# Patient Record
Sex: FEMALE | Race: WHITE | Employment: FULL TIME | URBAN - METROPOLITAN AREA
[De-identification: names, ages, dates, MRNs, and addresses within clinical notes are randomized per-mention and may not be internally consistent; named-entity substitution may affect disease eponyms.]

---

## 2017-03-07 ENCOUNTER — HOSPITAL ENCOUNTER (OUTPATIENT)
Dept: ULTRASOUND IMAGING | Facility: HOSPITAL | Age: 47
Discharge: HOME | End: 2017-03-07
Attending: OBSTETRICS & GYNECOLOGY
Payer: COMMERCIAL

## 2017-03-07 ENCOUNTER — HOSPITAL ENCOUNTER (OUTPATIENT)
Dept: MAMMOGRAPHY | Facility: HOSPITAL | Age: 47
Discharge: HOME | End: 2017-03-07
Attending: OBSTETRICS & GYNECOLOGY
Payer: COMMERCIAL

## 2017-03-07 DIAGNOSIS — R92.2 INCONCLUSIVE MAMMOGRAPHY DUE TO DENSE BREASTS: ICD-10-CM

## 2017-03-07 PROCEDURE — 76642 ULTRASOUND BREAST LIMITED: CPT

## 2017-03-07 PROCEDURE — 77065 DX MAMMO INCL CAD UNI: CPT

## 2017-09-01 ENCOUNTER — HOSPITAL ENCOUNTER (OUTPATIENT)
Dept: MAMMOGRAPHY | Facility: HOSPITAL | Age: 47
Discharge: HOME | End: 2017-09-01
Attending: OBSTETRICS & GYNECOLOGY
Payer: COMMERCIAL

## 2017-09-01 ENCOUNTER — HOSPITAL ENCOUNTER (OUTPATIENT)
Dept: ULTRASOUND IMAGING | Facility: HOSPITAL | Age: 47
Discharge: HOME | End: 2017-09-01
Attending: OBSTETRICS & GYNECOLOGY
Payer: COMMERCIAL

## 2017-09-01 DIAGNOSIS — R92.2 BREAST DENSITY: ICD-10-CM

## 2017-09-01 PROCEDURE — 76642 ULTRASOUND BREAST LIMITED: CPT | Performed by: OBSTETRICS & GYNECOLOGY

## 2017-09-01 PROCEDURE — 77066 DX MAMMO INCL CAD BI: CPT | Performed by: OBSTETRICS & GYNECOLOGY

## 2017-10-09 NOTE — PROGRESS NOTES
[address] [facility] [address] Gastroenterology                                                                                                  Clinic History and Physical     Pa as red sauces, spicy foods, or tomatoes. The patient has a family history of colon cancer in her paternal grandmother who was diagnosed in her [de-identified]. The patient feels otherwise well.   She denies lower abdominal pain, change in bowel habits, dysphagia, Magnesium (M2 MAGNESIUM) 100 MG Oral Cap Take  by mouth. Disp:  Rfl:    B Complex Oral Cap Take  by mouth. Disp:  Rfl:    Calcium Citrate-Vitamin D (CITRACAL/VITAMIN D OR) Take  by mouth.  Disp:  Rfl:        Allergies:    Iodine  [Radiology *    Hives  Pe microscopic hematuria, diverticulitis, ovarian cyst, who presents for evaluation of GERD. No recent CBC available to assess anemia status. 1.  GERD/epigastric pain: Physical exam unremarkable.   Patient has a long-standing history of GERD which she ha

## 2017-10-10 ENCOUNTER — OFFICE VISIT (OUTPATIENT)
Dept: GASTROENTEROLOGY | Facility: CLINIC | Age: 47
End: 2017-10-10

## 2017-10-10 VITALS
BODY MASS INDEX: 21.34 KG/M2 | SYSTOLIC BLOOD PRESSURE: 100 MMHG | DIASTOLIC BLOOD PRESSURE: 68 MMHG | WEIGHT: 125 LBS | HEIGHT: 64 IN | HEART RATE: 76 BPM

## 2017-10-10 DIAGNOSIS — K21.9 GASTROESOPHAGEAL REFLUX DISEASE WITHOUT ESOPHAGITIS: ICD-10-CM

## 2017-10-10 DIAGNOSIS — Z80.0 FAMILY HISTORY OF COLON CANCER: Primary | ICD-10-CM

## 2017-10-10 DIAGNOSIS — R10.13 EPIGASTRIC PAIN: ICD-10-CM

## 2017-10-10 PROCEDURE — 99203 OFFICE O/P NEW LOW 30 MIN: CPT | Performed by: NURSE PRACTITIONER

## 2017-10-10 PROCEDURE — 99212 OFFICE O/P EST SF 10 MIN: CPT | Performed by: NURSE PRACTITIONER

## 2017-10-10 NOTE — H&P
[address] [facility] [address] Gastroenterology                                                                                                  Clinic History and Physical     Pa as red sauces, spicy foods, or tomatoes. The patient has a family history of colon cancer in her paternal grandmother who was diagnosed in her [de-identified]. The patient feels otherwise well.   She denies lower abdominal pain, change in bowel habits, dysphagia, Magnesium (M2 MAGNESIUM) 100 MG Oral Cap Take  by mouth. Disp:  Rfl:    B Complex Oral Cap Take  by mouth. Disp:  Rfl:    Calcium Citrate-Vitamin D (CITRACAL/VITAMIN D OR) Take  by mouth.  Disp:  Rfl:        Allergies:    Iodine  [Radiology *    Hives  Pe microscopic hematuria, diverticulitis, ovarian cyst, who presents for evaluation of GERD. No recent CBC available to assess anemia status. 1.  GERD/epigastric pain: Physical exam unremarkable.   Patient has a long-standing history of GERD which she ha

## 2017-10-10 NOTE — PATIENT INSTRUCTIONS
1. Continue Prevacid once daily in morning before breakfast  2. May take Zantac as needed for breakthrough symptoms   3. See additional reflux suggestions below  4.  Call us when you are ready to schedule colonoscopy       Avoid Heartburn Triggers  - Laying

## 2018-03-06 ENCOUNTER — HOSPITAL ENCOUNTER (OUTPATIENT)
Dept: ULTRASOUND IMAGING | Facility: HOSPITAL | Age: 48
Discharge: HOME | End: 2018-03-06
Attending: OBSTETRICS & GYNECOLOGY
Payer: COMMERCIAL

## 2018-03-06 ENCOUNTER — HOSPITAL ENCOUNTER (OUTPATIENT)
Dept: MAMMOGRAPHY | Facility: HOSPITAL | Age: 48
Discharge: HOME | End: 2018-03-06
Attending: OBSTETRICS & GYNECOLOGY
Payer: COMMERCIAL

## 2018-03-06 DIAGNOSIS — R92.2 BREAST DENSITY: ICD-10-CM

## 2018-03-06 PROCEDURE — 77061 BREAST TOMOSYNTHESIS UNI: CPT | Performed by: OBSTETRICS & GYNECOLOGY

## 2018-03-06 PROCEDURE — 76642 ULTRASOUND BREAST LIMITED: CPT | Performed by: OBSTETRICS & GYNECOLOGY

## 2018-03-06 PROCEDURE — 77065 DX MAMMO INCL CAD UNI: CPT | Performed by: OBSTETRICS & GYNECOLOGY

## 2018-05-15 NOTE — PROGRESS NOTES
[facility] Follow-up Visit    Kortney sign of a hernia. Denies dysphagia or odynophagia. No change in appetite or unexpected weight loss. No chest pain or shortness of breath. No associated abdominal pain, changes in bowel habits, hematochezia, or melena.     Patient is overdue for a sc (COLYTE WITH FLAVOR PACKS) 240 g Oral Recon Soln As directed per GI consult notes Disp: 1 Bottle Rfl: 0   Acidophilus/Pectin Oral Cap Take 1 capsule by mouth daily. Disp:  Rfl:    lansoprazole (PREVACID) 30 MG Oral Capsule Delayed Release Take  by mouth.  D all 4 extremities   Psych: Pt has a normal mood and affect, behavior is normal    Nursing note and vitals reviewed      Labs/Imaging:     Patient's labs and imaging were reviewed and discussed with patient today. See HPI and A&P for further details. Sharon Zuleta amenable to proceeding. We reviewed the risks, benefits, limitations of the procedure.     5.  Family history of colon cancer        Recommend:  -Schedule colonoscopy w/ Dr. Lolis Dale with [facility] [place] or [facility] pending facility  -Prep: Split dose Colyte or equiva

## 2018-05-17 ENCOUNTER — OFFICE VISIT (OUTPATIENT)
Dept: GASTROENTEROLOGY | Facility: CLINIC | Age: 48
End: 2018-05-17

## 2018-05-17 ENCOUNTER — TELEPHONE (OUTPATIENT)
Dept: GASTROENTEROLOGY | Facility: CLINIC | Age: 48
End: 2018-05-17

## 2018-05-17 VITALS
HEIGHT: 64 IN | WEIGHT: 126 LBS | BODY MASS INDEX: 21.51 KG/M2 | DIASTOLIC BLOOD PRESSURE: 73 MMHG | SYSTOLIC BLOOD PRESSURE: 109 MMHG | HEART RATE: 66 BPM

## 2018-05-17 DIAGNOSIS — Z80.0 FAMILY HISTORY OF COLON CANCER: ICD-10-CM

## 2018-05-17 DIAGNOSIS — Z12.11 SCREENING FOR COLON CANCER: ICD-10-CM

## 2018-05-17 DIAGNOSIS — Z12.11 COLON CANCER SCREENING: Primary | ICD-10-CM

## 2018-05-17 DIAGNOSIS — K44.9 HIATAL HERNIA WITH GERD: Primary | ICD-10-CM

## 2018-05-17 DIAGNOSIS — K44.9 HIATAL HERNIA: ICD-10-CM

## 2018-05-17 DIAGNOSIS — R19.8 ASYMMETRY OF ABDOMINAL WALL: ICD-10-CM

## 2018-05-17 DIAGNOSIS — K21.9 HIATAL HERNIA WITH GERD: Primary | ICD-10-CM

## 2018-05-17 DIAGNOSIS — K21.9 GASTROESOPHAGEAL REFLUX DISEASE, ESOPHAGITIS PRESENCE NOT SPECIFIED: ICD-10-CM

## 2018-05-17 PROCEDURE — 99214 OFFICE O/P EST MOD 30 MIN: CPT | Performed by: NURSE PRACTITIONER

## 2018-05-17 PROCEDURE — 99212 OFFICE O/P EST SF 10 MIN: CPT | Performed by: NURSE PRACTITIONER

## 2018-05-17 RX ORDER — GARLIC EXTRACT 500 MG
1 CAPSULE ORAL DAILY
COMMUNITY
End: 2021-10-28

## 2018-05-17 NOTE — PATIENT INSTRUCTIONS
1. Schedule colonoscopy with Dr. Darcy Walker w/ [facility] or [facility] pending facility    2.  bowel prep from pharmacy - split dose Colyte    3. Continue all medications for procedure     4. Read all bowel prep instructions carefully    5.  AVOID seeds, nuts,

## 2018-05-17 NOTE — TELEPHONE ENCOUNTER
Scheduled for:  Colonoscopy - 63338  Provider Name:  Dr. Mary Harris  Date:  6/8/18  Location:  [facility]  Sedation:  IV  Time:  11:30 am (pt is aware to arrive at 10:30 am)  Prep:  Colyte, Prep instructions were given to pt in the office, pt verbalized understanding.

## 2018-05-31 NOTE — TELEPHONE ENCOUNTER
I spoke with this patient to see if she would like to come for her procedure 30 min earlier, from 1130 to 1100 (to arrive at 1000). She agreed to change her time and will arrive at 1000.

## 2018-06-04 ENCOUNTER — TELEPHONE (OUTPATIENT)
Dept: GASTROENTEROLOGY | Facility: CLINIC | Age: 48
End: 2018-06-04

## 2018-06-04 NOTE — TELEPHONE ENCOUNTER
[facility] sent RX to pharmacy on 5/17/18. Spoke w/ Brenden Manzano at [facility] and states they have the RX \"on hold. \" He will process the RX for her and notify her once it is ready for  (states it's $0 copay).     LM for pt to f/u w/ pharmacy for  time an

## 2018-06-08 ENCOUNTER — SURGERY (OUTPATIENT)
Age: 48
End: 2018-06-08

## 2018-06-08 ENCOUNTER — HOSPITAL ENCOUNTER (OUTPATIENT)
Facility: HOSPITAL | Age: 48
Setting detail: HOSPITAL OUTPATIENT SURGERY
Discharge: HOME | End: 2018-06-08
Attending: INTERNAL MEDICINE | Admitting: INTERNAL MEDICINE
Payer: COMMERCIAL

## 2018-06-08 VITALS
OXYGEN SATURATION: 100 % | SYSTOLIC BLOOD PRESSURE: 100 MMHG | HEART RATE: 62 BPM | DIASTOLIC BLOOD PRESSURE: 74 MMHG | WEIGHT: 126 LBS | HEIGHT: 64 IN | RESPIRATION RATE: 15 BRPM | BODY MASS INDEX: 21.51 KG/M2

## 2018-06-08 DIAGNOSIS — K21.9 GASTROESOPHAGEAL REFLUX DISEASE, ESOPHAGITIS PRESENCE NOT SPECIFIED: ICD-10-CM

## 2018-06-08 DIAGNOSIS — Z80.0 FAMILY HISTORY OF COLON CANCER: ICD-10-CM

## 2018-06-08 DIAGNOSIS — K44.9 HIATAL HERNIA: ICD-10-CM

## 2018-06-08 DIAGNOSIS — Z12.11 COLON CANCER SCREENING: ICD-10-CM

## 2018-06-08 PROCEDURE — 0DBM8ZX EXCISION OF DESCENDING COLON, VIA NATURAL OR ARTIFICIAL OPENING ENDOSCOPIC, DIAGNOSTIC: ICD-10-PCS | Performed by: INTERNAL MEDICINE

## 2018-06-08 PROCEDURE — 45385 COLONOSCOPY W/LESION REMOVAL: CPT | Performed by: INTERNAL MEDICINE

## 2018-06-08 PROCEDURE — G0500 MOD SEDAT ENDO SERVICE >5YRS: HCPCS | Performed by: INTERNAL MEDICINE

## 2018-06-08 RX ORDER — MIDAZOLAM HYDROCHLORIDE 1 MG/ML
INJECTION INTRAMUSCULAR; INTRAVENOUS
Status: DISCONTINUED | OUTPATIENT
Start: 2018-06-08 | End: 2018-06-08

## 2018-06-08 RX ORDER — MIDAZOLAM HYDROCHLORIDE 1 MG/ML
1 INJECTION INTRAMUSCULAR; INTRAVENOUS EVERY 5 MIN PRN
Status: DISCONTINUED | OUTPATIENT
Start: 2018-06-08 | End: 2018-06-08

## 2018-06-08 RX ORDER — SODIUM CHLORIDE 0.9 % (FLUSH) 0.9 %
10 SYRINGE (ML) INJECTION AS NEEDED
Status: DISCONTINUED | OUTPATIENT
Start: 2018-06-08 | End: 2018-06-08

## 2018-06-08 RX ORDER — SODIUM CHLORIDE, SODIUM LACTATE, POTASSIUM CHLORIDE, CALCIUM CHLORIDE 600; 310; 30; 20 MG/100ML; MG/100ML; MG/100ML; MG/100ML
INJECTION, SOLUTION INTRAVENOUS CONTINUOUS
Status: DISCONTINUED | OUTPATIENT
Start: 2018-06-08 | End: 2018-06-08

## 2018-06-08 NOTE — OPERATIVE REPORT
[facility] Endoscopy Report      Preoperative Diagnosis:  - colon cancer screening  - family history colon cancer       Postoperative Diagnosis:  - colon polyp x 1  - pan-diverticular disease  - internal hemorrhoids      Procedure:    Colon

## 2018-06-08 NOTE — H&P
History & Physical Examination    Patient Name: Jackeline Munson  MRN: [id number]  CSN: [id number]  YOB: 1970    Diagnosis: colon screening/family history    Prescriptions Prior to Admission:  psyllium 28 % Oral Powd Pack Take 1 packet by mo drinks daily       SYSTEM Check if Review is Normal Check if Physical Exam is Normal If not normal, please explain:   HEENT [x ] [ x]    NECK & BACK [x ] [x ]    HEART [x ] [ x]    LUNGS [x ] [ x]    ABDOMEN [x ] [x ]    UROGENITAL [ ] [ ]    EXTREMITIES [

## 2018-06-14 ENCOUNTER — LAB ENCOUNTER (OUTPATIENT)
Dept: LAB | Age: 48
End: 2018-06-14
Attending: FAMILY MEDICINE
Payer: COMMERCIAL

## 2018-06-14 ENCOUNTER — OFFICE VISIT (OUTPATIENT)
Dept: FAMILY MEDICINE CLINIC | Facility: CLINIC | Age: 48
End: 2018-06-14

## 2018-06-14 VITALS
SYSTOLIC BLOOD PRESSURE: 122 MMHG | DIASTOLIC BLOOD PRESSURE: 76 MMHG | BODY MASS INDEX: 20.83 KG/M2 | WEIGHT: 122 LBS | HEART RATE: 61 BPM | HEIGHT: 64 IN

## 2018-06-14 DIAGNOSIS — Z00.00 ROUTINE MEDICAL EXAM: ICD-10-CM

## 2018-06-14 DIAGNOSIS — Z00.00 ROUTINE MEDICAL EXAM: Primary | ICD-10-CM

## 2018-06-14 PROCEDURE — 80061 LIPID PANEL: CPT

## 2018-06-14 PROCEDURE — 85025 COMPLETE CBC W/AUTO DIFF WBC: CPT

## 2018-06-14 PROCEDURE — 36415 COLL VENOUS BLD VENIPUNCTURE: CPT

## 2018-06-14 PROCEDURE — 82607 VITAMIN B-12: CPT

## 2018-06-14 PROCEDURE — 84443 ASSAY THYROID STIM HORMONE: CPT

## 2018-06-14 PROCEDURE — 99386 PREV VISIT NEW AGE 40-64: CPT | Performed by: FAMILY MEDICINE

## 2018-06-14 PROCEDURE — 80053 COMPREHEN METABOLIC PANEL: CPT

## 2018-06-14 PROCEDURE — 82306 VITAMIN D 25 HYDROXY: CPT

## 2018-06-14 NOTE — PROGRESS NOTES
HPI:   Nivia Rosado is a 46year old female who presents for a complete physical exam.    Reports no concerns with her health. Does exercise on occasion - eats healthy overall. Just had her colonoscopy  - Dr. Ranjan Galindo.   Sees urology at [facility] vision or double vision  HEENT: denies nasal congestion, sinus pain or ST  LUNGS: denies shortness of breath with exertion, denies orthopnea  CARDIOVASCULAR: denies chest pain on exertion  GI: denies abdominal pain,denies heartburn  : denies dysuria, vag

## 2018-06-18 ENCOUNTER — TELEPHONE (OUTPATIENT)
Dept: GASTROENTEROLOGY | Facility: CLINIC | Age: 48
End: 2018-06-18

## 2018-06-18 NOTE — TELEPHONE ENCOUNTER
Results letter generated and mailed to pt home address today. Entered into EPIC:Recall colon in 5 years per Dr. Jaycee Isaac. Last Colon done 6/8/18, next due 6/8/23. Snapshot updated.

## 2018-06-18 NOTE — TELEPHONE ENCOUNTER
----- Message from Tata Boyd MD sent at 6/15/2018  4:17 PM CDT -----  I wanted to get back to you with your colonoscopy results. You had 1 colon polyp removed which was benign.   I would advise a repeat colonoscopy in 5 years to make sure no new saúl

## 2018-06-23 ENCOUNTER — HOSPITAL ENCOUNTER (OUTPATIENT)
Age: 48
Discharge: HOME | End: 2018-06-23
Attending: EMERGENCY MEDICINE
Payer: COMMERCIAL

## 2018-06-23 VITALS
HEART RATE: 78 BPM | TEMPERATURE: 98 F | DIASTOLIC BLOOD PRESSURE: 72 MMHG | SYSTOLIC BLOOD PRESSURE: 125 MMHG | HEIGHT: 64 IN | RESPIRATION RATE: 18 BRPM | OXYGEN SATURATION: 100 % | WEIGHT: 125 LBS | BODY MASS INDEX: 21.34 KG/M2

## 2018-06-23 DIAGNOSIS — S40.861A INSECT BITE OF RIGHT ARM, INITIAL ENCOUNTER: ICD-10-CM
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
## 2018-06-23 DIAGNOSIS — L03.113 CELLULITIS OF RIGHT UPPER ARM: Primary | ICD-10-CM

## 2018-06-23 PROCEDURE — 99213 OFFICE O/P EST LOW 20 MIN: CPT

## 2018-06-23 PROCEDURE — 99203 OFFICE O/P NEW LOW 30 MIN: CPT

## 2018-06-23 RX ORDER — CEPHALEXIN 500 MG/1
500 CAPSULE ORAL 3 TIMES DAILY
Qty: 21 CAPSULE | Refills: 0 | Status: SHIPPED | OUTPATIENT
Start: 2018-06-23 | End: 2018-06-30

## 2018-06-23 NOTE — ED PROVIDER NOTES
Patient Seen in: [facility] In [address] [place] [address]    History   Patient presents with:  Bite Sting,Insect (integumentary)    Stated Complaint: Insect Bite    HPI    The patient's a 47-year-old female with a history of gastritis who presents wi 125/72  Pulse: 78  Resp: 18  Temp: 97.9 °F (36.6 °C)  Temp src: Oral  SpO2: 100 %  O2 Device: None (Room air)    Current:/72   Pulse 78   Temp 97.9 °F (36.6 °C) (Oral)   Resp 18   Ht 162.6 cm (5' 4\")   Wt 56.7 kg   LMP 06/14/2018 (Approximate)   SpO

## 2018-06-23 NOTE — ED INITIAL ASSESSMENT (HPI)
Pt reports noticing a bug bite to R upper arm on tues. Now surrounded by redness. Denies fever, chills or body aches.

## 2018-06-26 NOTE — PROGRESS NOTES
You do have mildly elevated cholesterol. Need to change diet and start exercising 4-5 times a week 30-45 minutes. . Decrease food portions. No fast foods - stick with fresh fruits/veggies, chicken and fish.  Can also take fish oil 1000 mg 1-2 tablets twice a

## 2018-07-03 ENCOUNTER — TELEPHONE (OUTPATIENT)
Dept: FAMILY MEDICINE CLINIC | Facility: CLINIC | Age: 48
End: 2018-07-03

## 2018-07-03 NOTE — TELEPHONE ENCOUNTER
Dr. Markell Mendoza received and completed [employer] Waiver form. Form faxed to [phone] confirmation received. Per patient request copy mailed to home address.

## 2018-08-09 ENCOUNTER — HOSPITAL ENCOUNTER (OUTPATIENT)
Dept: ULTRASOUND IMAGING | Facility: HOSPITAL | Age: 48
Discharge: HOME | End: 2018-08-09
Attending: OBSTETRICS & GYNECOLOGY
Payer: COMMERCIAL

## 2018-08-09 ENCOUNTER — HOSPITAL ENCOUNTER (OUTPATIENT)
Dept: MAMMOGRAPHY | Facility: HOSPITAL | Age: 48
Discharge: HOME | End: 2018-08-09
Attending: OBSTETRICS & GYNECOLOGY
Payer: COMMERCIAL

## 2018-08-09 DIAGNOSIS — R92.2 INCONCLUSIVE MAMMOGRAM: ICD-10-CM

## 2018-08-09 PROCEDURE — 77066 DX MAMMO INCL CAD BI: CPT | Performed by: OBSTETRICS & GYNECOLOGY

## 2018-08-09 PROCEDURE — 77062 BREAST TOMOSYNTHESIS BI: CPT | Performed by: OBSTETRICS & GYNECOLOGY

## 2018-08-09 PROCEDURE — 76642 ULTRASOUND BREAST LIMITED: CPT | Performed by: OBSTETRICS & GYNECOLOGY

## 2019-07-16 ENCOUNTER — OFFICE VISIT (OUTPATIENT)
Dept: FAMILY MEDICINE CLINIC | Facility: CLINIC | Age: 49
End: 2019-07-16
Payer: COMMERCIAL

## 2019-07-16 ENCOUNTER — LAB ENCOUNTER (OUTPATIENT)
Dept: LAB | Age: 49
End: 2019-07-16
Attending: FAMILY MEDICINE
Payer: COMMERCIAL

## 2019-07-16 VITALS
HEIGHT: 64 IN | DIASTOLIC BLOOD PRESSURE: 67 MMHG | HEART RATE: 58 BPM | BODY MASS INDEX: 22.02 KG/M2 | SYSTOLIC BLOOD PRESSURE: 95 MMHG | WEIGHT: 129 LBS

## 2019-07-16 DIAGNOSIS — R14.0 ABDOMINAL BLOATING: ICD-10-CM

## 2019-07-16 DIAGNOSIS — Z00.00 ROUTINE MEDICAL EXAM: Primary | ICD-10-CM

## 2019-07-16 DIAGNOSIS — Z00.00 ROUTINE MEDICAL EXAM: ICD-10-CM

## 2019-07-16 LAB
ALBUMIN SERPL-MCNC: 4.2 G/DL (ref 3.4–5)
ALBUMIN/GLOB SERPL: 1.2 {RATIO} (ref 1–2)
ALP LIVER SERPL-CCNC: 54 U/L (ref 41–108)
ALT SERPL-CCNC: 17 U/L (ref 13–56)
ANION GAP SERPL CALC-SCNC: 6 MMOL/L (ref 0–18)
AST SERPL-CCNC: 10 U/L (ref 15–37)
BASOPHILS # BLD AUTO: 0.04 X10(3) UL (ref 0–0.2)
BASOPHILS NFR BLD AUTO: 0.6 %
BILIRUB SERPL-MCNC: 0.7 MG/DL (ref 0.1–2)
BUN BLD-MCNC: 17 MG/DL (ref 7–18)
BUN/CREAT SERPL: 19.3 (ref 10–20)
CALCIUM BLD-MCNC: 9.5 MG/DL (ref 8.5–10.1)
CHLORIDE SERPL-SCNC: 107 MMOL/L (ref 98–112)
CHOLEST SMN-MCNC: 206 MG/DL (ref ?–200)
CO2 SERPL-SCNC: 27 MMOL/L (ref 21–32)
CREAT BLD-MCNC: 0.88 MG/DL (ref 0.55–1.02)
DEPRECATED RDW RBC AUTO: 41.6 FL (ref 35.1–46.3)
EOSINOPHIL # BLD AUTO: 0.11 X10(3) UL (ref 0–0.7)
EOSINOPHIL NFR BLD AUTO: 1.6 %
ERYTHROCYTE [DISTWIDTH] IN BLOOD BY AUTOMATED COUNT: 12 % (ref 11–15)
GLOBULIN PLAS-MCNC: 3.4 G/DL (ref 2.8–4.4)
GLUCOSE BLD-MCNC: 87 MG/DL (ref 70–99)
HCT VFR BLD AUTO: 46.5 % (ref 35–48)
HDLC SERPL-MCNC: 88 MG/DL (ref 40–59)
HGB BLD-MCNC: 15.5 G/DL (ref 12–16)
IMM GRANULOCYTES # BLD AUTO: 0.02 X10(3) UL (ref 0–1)
IMM GRANULOCYTES NFR BLD: 0.3 %
LDLC SERPL CALC-MCNC: 107 MG/DL (ref ?–100)
LYMPHOCYTES # BLD AUTO: 1.92 X10(3) UL (ref 1–4)
LYMPHOCYTES NFR BLD AUTO: 27.3 %
M PROTEIN MFR SERPL ELPH: 7.6 G/DL (ref 6.4–8.2)
MCH RBC QN AUTO: 31.2 PG (ref 26–34)
MCHC RBC AUTO-ENTMCNC: 33.3 G/DL (ref 31–37)
MCV RBC AUTO: 93.6 FL (ref 80–100)
MONOCYTES # BLD AUTO: 0.52 X10(3) UL (ref 0.1–1)
MONOCYTES NFR BLD AUTO: 7.4 %
NEUTROPHILS # BLD AUTO: 4.43 X10 (3) UL (ref 1.5–7.7)
NEUTROPHILS # BLD AUTO: 4.43 X10(3) UL (ref 1.5–7.7)
NEUTROPHILS NFR BLD AUTO: 62.8 %
NONHDLC SERPL-MCNC: 118 MG/DL (ref ?–130)
OSMOLALITY SERPL CALC.SUM OF ELEC: 291 MOSM/KG (ref 275–295)
PATIENT FASTING: YES
PATIENT FASTING: YES
PLATELET # BLD AUTO: 210 10(3)UL (ref 150–450)
POTASSIUM SERPL-SCNC: 4.2 MMOL/L (ref 3.5–5.1)
RBC # BLD AUTO: 4.97 X10(6)UL (ref 3.8–5.3)
SODIUM SERPL-SCNC: 140 MMOL/L (ref 136–145)
TRIGL SERPL-MCNC: 54 MG/DL (ref 30–149)
TSI SER-ACNC: 1.34 MIU/ML (ref 0.36–3.74)
VIT B12 SERPL-MCNC: 466 PG/ML (ref 193–986)
VLDLC SERPL CALC-MCNC: 11 MG/DL (ref 0–30)
WBC # BLD AUTO: 7 X10(3) UL (ref 4–11)

## 2019-07-16 PROCEDURE — 84443 ASSAY THYROID STIM HORMONE: CPT

## 2019-07-16 PROCEDURE — 36415 COLL VENOUS BLD VENIPUNCTURE: CPT

## 2019-07-16 PROCEDURE — 80053 COMPREHEN METABOLIC PANEL: CPT

## 2019-07-16 PROCEDURE — 82607 VITAMIN B-12: CPT

## 2019-07-16 PROCEDURE — 99396 PREV VISIT EST AGE 40-64: CPT | Performed by: FAMILY MEDICINE

## 2019-07-16 PROCEDURE — 82306 VITAMIN D 25 HYDROXY: CPT

## 2019-07-16 PROCEDURE — 85025 COMPLETE CBC W/AUTO DIFF WBC: CPT

## 2019-07-16 PROCEDURE — 80061 LIPID PANEL: CPT

## 2019-07-16 RX ORDER — NICOTINE POLACRILEX 2 MG
1 GUM BUCCAL DAILY
COMMUNITY
Start: 2019-02-01 | End: 2021-08-24

## 2019-07-16 NOTE — PROGRESS NOTES
HPI:   Kapil Justin is a 44year old female who presents for a complete physical exam.    Pt here for regular physical. Exercises about 30 minutes - walks stairs at work 12 floors. Eats healthy overall.    Regular menses - lasts only a day  Had cool sc Alcohol use: Yes      Comment: 2 drinks daily    Drug use: No         REVIEW OF SYSTEMS:   GENERAL: feels well otherwise  SKIN: denies any skin lesions  EYES:denies blurred vision or double vision  HEENT: denies nasal congestion, sinus pain or ST  LUNGS:

## 2019-07-17 LAB — 25(OH)D3 SERPL-MCNC: 27.6 NG/ML (ref 30–100)

## 2019-07-23 NOTE — PROGRESS NOTES
Efraín Lambert job on the cholesterol - improved compared to last year. Vitamin d levels are a bit low so please take extra vitamin D 2000 units daily.  Rest of the labs were normal. Normal glucose, kidney, liver and thyroid function. - Dr. Jason Gallego

## 2019-08-06 ENCOUNTER — TELEPHONE (OUTPATIENT)
Dept: FAMILY MEDICINE CLINIC | Facility: CLINIC | Age: 49
End: 2019-08-06

## 2019-08-06 NOTE — TELEPHONE ENCOUNTER
Dr. Echo Coleman received and completed Health Provider Screening form. Form faxed to [phone] confirmation received. Per patient request copy mailed to home address.

## 2019-09-09 ENCOUNTER — HOSPITAL ENCOUNTER (OUTPATIENT)
Dept: MAMMOGRAPHY | Facility: HOSPITAL | Age: 49
Discharge: HOME | End: 2019-09-09
Attending: OBSTETRICS & GYNECOLOGY
Payer: COMMERCIAL

## 2019-09-09 ENCOUNTER — HOSPITAL ENCOUNTER (OUTPATIENT)
Dept: ULTRASOUND IMAGING | Facility: HOSPITAL | Age: 49
Discharge: HOME | End: 2019-09-09
Attending: OBSTETRICS & GYNECOLOGY
Payer: COMMERCIAL

## 2019-09-09 DIAGNOSIS — N63.0 BREAST LUMP: ICD-10-CM

## 2019-09-09 PROCEDURE — 77062 BREAST TOMOSYNTHESIS BI: CPT | Performed by: OBSTETRICS & GYNECOLOGY

## 2019-09-09 PROCEDURE — 77066 DX MAMMO INCL CAD BI: CPT | Performed by: OBSTETRICS & GYNECOLOGY

## 2019-09-09 PROCEDURE — 76642 ULTRASOUND BREAST LIMITED: CPT | Performed by: OBSTETRICS & GYNECOLOGY

## 2020-05-29 ENCOUNTER — TELEPHONE (OUTPATIENT)
Dept: FAMILY MEDICINE CLINIC | Facility: CLINIC | Age: 50
End: 2020-05-29

## 2020-05-29 DIAGNOSIS — E55.9 VITAMIN D DEFICIENCY: ICD-10-CM

## 2020-05-29 DIAGNOSIS — Z00.00 ROUTINE MEDICAL EXAM: Primary | ICD-10-CM

## 2020-07-30 ENCOUNTER — LAB ENCOUNTER (OUTPATIENT)
Dept: LAB | Age: 50
End: 2020-07-30
Attending: FAMILY MEDICINE
Payer: COMMERCIAL

## 2020-07-30 DIAGNOSIS — Z00.00 ROUTINE MEDICAL EXAM: ICD-10-CM

## 2020-07-30 DIAGNOSIS — E55.9 VITAMIN D DEFICIENCY: ICD-10-CM

## 2020-07-30 LAB
ALBUMIN SERPL-MCNC: 3.7 G/DL (ref 3.4–5)
ALBUMIN/GLOB SERPL: 1.1 {RATIO} (ref 1–2)
ALP LIVER SERPL-CCNC: 53 U/L (ref 41–108)
ALT SERPL-CCNC: 20 U/L (ref 13–56)
ANION GAP SERPL CALC-SCNC: 6 MMOL/L (ref 0–18)
AST SERPL-CCNC: 12 U/L (ref 15–37)
BASOPHILS # BLD AUTO: 0.04 X10(3) UL (ref 0–0.2)
BASOPHILS NFR BLD AUTO: 0.6 %
BILIRUB SERPL-MCNC: 0.5 MG/DL (ref 0.1–2)
BUN BLD-MCNC: 21 MG/DL (ref 7–18)
BUN/CREAT SERPL: 25.3 (ref 10–20)
CALCIUM BLD-MCNC: 8.9 MG/DL (ref 8.5–10.1)
CHLORIDE SERPL-SCNC: 108 MMOL/L (ref 98–112)
CHOLEST SMN-MCNC: 212 MG/DL (ref ?–200)
CO2 SERPL-SCNC: 26 MMOL/L (ref 21–32)
CREAT BLD-MCNC: 0.83 MG/DL (ref 0.55–1.02)
DEPRECATED RDW RBC AUTO: 42.2 FL (ref 35.1–46.3)
EOSINOPHIL # BLD AUTO: 0.14 X10(3) UL (ref 0–0.7)
EOSINOPHIL NFR BLD AUTO: 2.1 %
ERYTHROCYTE [DISTWIDTH] IN BLOOD BY AUTOMATED COUNT: 12.2 % (ref 11–15)
GLOBULIN PLAS-MCNC: 3.3 G/DL (ref 2.8–4.4)
GLUCOSE BLD-MCNC: 87 MG/DL (ref 70–99)
HCT VFR BLD AUTO: 45 % (ref 35–48)
HDLC SERPL-MCNC: 87 MG/DL (ref 40–59)
HGB BLD-MCNC: 15 G/DL (ref 12–16)
IMM GRANULOCYTES # BLD AUTO: 0.03 X10(3) UL (ref 0–1)
IMM GRANULOCYTES NFR BLD: 0.5 %
LDLC SERPL CALC-MCNC: 109 MG/DL (ref ?–100)
LYMPHOCYTES # BLD AUTO: 1.59 X10(3) UL (ref 1–4)
LYMPHOCYTES NFR BLD AUTO: 24.2 %
M PROTEIN MFR SERPL ELPH: 7 G/DL (ref 6.4–8.2)
MCH RBC QN AUTO: 31.4 PG (ref 26–34)
MCHC RBC AUTO-ENTMCNC: 33.3 G/DL (ref 31–37)
MCV RBC AUTO: 94.1 FL (ref 80–100)
MONOCYTES # BLD AUTO: 0.52 X10(3) UL (ref 0.1–1)
MONOCYTES NFR BLD AUTO: 7.9 %
NEUTROPHILS # BLD AUTO: 4.24 X10 (3) UL (ref 1.5–7.7)
NEUTROPHILS # BLD AUTO: 4.24 X10(3) UL (ref 1.5–7.7)
NEUTROPHILS NFR BLD AUTO: 64.7 %
NONHDLC SERPL-MCNC: 125 MG/DL (ref ?–130)
OSMOLALITY SERPL CALC.SUM OF ELEC: 292 MOSM/KG (ref 275–295)
PATIENT FASTING Y/N/NP: YES
PATIENT FASTING Y/N/NP: YES
PLATELET # BLD AUTO: 225 10(3)UL (ref 150–450)
POTASSIUM SERPL-SCNC: 4.3 MMOL/L (ref 3.5–5.1)
RBC # BLD AUTO: 4.78 X10(6)UL (ref 3.8–5.3)
SODIUM SERPL-SCNC: 140 MMOL/L (ref 136–145)
TRIGL SERPL-MCNC: 82 MG/DL (ref 30–149)
TSI SER-ACNC: 1.41 MIU/ML (ref 0.36–3.74)
VIT B12 SERPL-MCNC: 482 PG/ML (ref 193–986)
VLDLC SERPL CALC-MCNC: 16 MG/DL (ref 0–30)
WBC # BLD AUTO: 6.6 X10(3) UL (ref 4–11)

## 2020-07-30 PROCEDURE — 82306 VITAMIN D 25 HYDROXY: CPT

## 2020-07-30 PROCEDURE — 80061 LIPID PANEL: CPT

## 2020-07-30 PROCEDURE — 80053 COMPREHEN METABOLIC PANEL: CPT

## 2020-07-30 PROCEDURE — 36415 COLL VENOUS BLD VENIPUNCTURE: CPT

## 2020-07-30 PROCEDURE — 84443 ASSAY THYROID STIM HORMONE: CPT

## 2020-07-30 PROCEDURE — 85025 COMPLETE CBC W/AUTO DIFF WBC: CPT

## 2020-07-30 PROCEDURE — 82607 VITAMIN B-12: CPT

## 2020-07-31 LAB — 25(OH)D3 SERPL-MCNC: 36.3 NG/ML (ref 30–100)

## 2020-08-17 ENCOUNTER — OFFICE VISIT (OUTPATIENT)
Dept: FAMILY MEDICINE CLINIC | Facility: CLINIC | Age: 50
End: 2020-08-17
Payer: COMMERCIAL

## 2020-08-17 VITALS
HEIGHT: 64 IN | BODY MASS INDEX: 22.53 KG/M2 | SYSTOLIC BLOOD PRESSURE: 114 MMHG | WEIGHT: 132 LBS | HEART RATE: 72 BPM | DIASTOLIC BLOOD PRESSURE: 70 MMHG

## 2020-08-17 DIAGNOSIS — Z01.419 ROUTINE GYNECOLOGICAL EXAMINATION: Primary | ICD-10-CM

## 2020-08-17 DIAGNOSIS — R92.8 ABNORMAL MAMMOGRAM: ICD-10-CM

## 2020-08-17 PROCEDURE — 99396 PREV VISIT EST AGE 40-64: CPT | Performed by: FAMILY MEDICINE

## 2020-08-17 PROCEDURE — 3078F DIAST BP <80 MM HG: CPT | Performed by: FAMILY MEDICINE

## 2020-08-17 PROCEDURE — 3008F BODY MASS INDEX DOCD: CPT | Performed by: FAMILY MEDICINE

## 2020-08-17 PROCEDURE — 3074F SYST BP LT 130 MM HG: CPT | Performed by: FAMILY MEDICINE

## 2020-08-17 NOTE — PROGRESS NOTES
HPI:   Alejandro Colin is a 44year old female who presents for a complete physical exam.   Patient's last menstrual period was 07/17/2020. Pt here for regular physical/gyne exam. Still has menses but light. Exercises but not as much.    Wt Readings from Cerebral Aneurysm   • Heart Disease Father         CAD   • Diabetes Brother    • Breast Cancer Paternal Aunt [de-identified]      Social History:   Social History    Tobacco Use      Smoking status: Never Smoker      Smokeless tobacco: Never Used    Alcohol use: done. Order put in for mammogram.. Self breast exam explained. Health maintenance. Pt' s weight is Body mass index is 22.66 kg/m². , recommended low fat diet and aerobic exercise 30 minutes three times weekly.   The patient indicates understanding of these i

## 2020-08-18 LAB — HPV I/H RISK 1 DNA SPEC QL NAA+PROBE: NEGATIVE

## 2020-09-08 ENCOUNTER — MED REC SCAN ONLY (OUTPATIENT)
Dept: FAMILY MEDICINE CLINIC | Facility: CLINIC | Age: 50
End: 2020-09-08

## 2020-09-16 ENCOUNTER — HOSPITAL ENCOUNTER (OUTPATIENT)
Dept: ULTRASOUND IMAGING | Facility: HOSPITAL | Age: 50
Discharge: HOME | End: 2020-09-16
Attending: FAMILY MEDICINE
Payer: COMMERCIAL

## 2020-09-16 ENCOUNTER — HOSPITAL ENCOUNTER (OUTPATIENT)
Dept: MAMMOGRAPHY | Facility: HOSPITAL | Age: 50
Discharge: HOME | End: 2020-09-16
Attending: FAMILY MEDICINE
Payer: COMMERCIAL

## 2020-09-16 DIAGNOSIS — R92.8 ABNORMAL MAMMOGRAM: ICD-10-CM

## 2020-09-16 PROCEDURE — 76642 ULTRASOUND BREAST LIMITED: CPT | Performed by: FAMILY MEDICINE

## 2020-09-16 PROCEDURE — 77062 BREAST TOMOSYNTHESIS BI: CPT | Performed by: FAMILY MEDICINE

## 2020-09-16 PROCEDURE — 77066 DX MAMMO INCL CAD BI: CPT | Performed by: FAMILY MEDICINE

## 2020-09-18 NOTE — PROGRESS NOTES
Pop Loya  - Normal mammogram. Plan for repeat in 1 year. You have benign non concerning cysts within the breasts. Can have normal screening next year.  - Dr Aurelio Arvizu

## 2021-04-01 ENCOUNTER — TELEPHONE (OUTPATIENT)
Dept: FAMILY MEDICINE CLINIC | Facility: CLINIC | Age: 51
End: 2021-04-01

## 2021-04-01 DIAGNOSIS — Z00.00 ROUTINE MEDICAL EXAM: Primary | ICD-10-CM

## 2021-04-01 DIAGNOSIS — E55.9 VITAMIN D DEFICIENCY: ICD-10-CM

## 2021-04-02 NOTE — TELEPHONE ENCOUNTER
Routed to Dr Claire Casanova for advise, thanks.       Future Appointments   Date Time Provider Henna Santana   8/18/2021  9:00 AM [facility]

## 2021-08-09 ENCOUNTER — LAB ENCOUNTER (OUTPATIENT)
Dept: LAB | Age: 51
End: 2021-08-09
Attending: FAMILY MEDICINE
Payer: COMMERCIAL

## 2021-08-09 DIAGNOSIS — E55.9 VITAMIN D DEFICIENCY: ICD-10-CM

## 2021-08-09 DIAGNOSIS — Z00.00 ROUTINE MEDICAL EXAM: ICD-10-CM

## 2021-08-09 LAB
ALBUMIN SERPL-MCNC: 4 G/DL (ref 3.4–5)
ALBUMIN/GLOB SERPL: 1.2 {RATIO} (ref 1–2)
ALP LIVER SERPL-CCNC: 53 U/L
ALT SERPL-CCNC: 21 U/L
ANION GAP SERPL CALC-SCNC: 6 MMOL/L (ref 0–18)
AST SERPL-CCNC: 11 U/L (ref 15–37)
BASOPHILS # BLD AUTO: 0.03 X10(3) UL (ref 0–0.2)
BASOPHILS NFR BLD AUTO: 0.4 %
BILIRUB SERPL-MCNC: 0.6 MG/DL (ref 0.1–2)
BUN BLD-MCNC: 19 MG/DL (ref 7–18)
BUN/CREAT SERPL: 23.8 (ref 10–20)
CALCIUM BLD-MCNC: 8.8 MG/DL (ref 8.5–10.1)
CHLORIDE SERPL-SCNC: 107 MMOL/L (ref 98–112)
CHOLEST SMN-MCNC: 204 MG/DL (ref ?–200)
CO2 SERPL-SCNC: 26 MMOL/L (ref 21–32)
CREAT BLD-MCNC: 0.8 MG/DL
DEPRECATED RDW RBC AUTO: 40.4 FL (ref 35.1–46.3)
EOSINOPHIL # BLD AUTO: 0.15 X10(3) UL (ref 0–0.7)
EOSINOPHIL NFR BLD AUTO: 2.1 %
ERYTHROCYTE [DISTWIDTH] IN BLOOD BY AUTOMATED COUNT: 11.9 % (ref 11–15)
GLOBULIN PLAS-MCNC: 3.4 G/DL (ref 2.8–4.4)
GLUCOSE BLD-MCNC: 85 MG/DL (ref 70–99)
HCT VFR BLD AUTO: 44 %
HDLC SERPL-MCNC: 77 MG/DL (ref 40–59)
HGB BLD-MCNC: 15.1 G/DL
IMM GRANULOCYTES # BLD AUTO: 0.02 X10(3) UL (ref 0–1)
IMM GRANULOCYTES NFR BLD: 0.3 %
LDLC SERPL CALC-MCNC: 113 MG/DL (ref ?–100)
LYMPHOCYTES # BLD AUTO: 1.83 X10(3) UL (ref 1–4)
LYMPHOCYTES NFR BLD AUTO: 25.8 %
M PROTEIN MFR SERPL ELPH: 7.4 G/DL (ref 6.4–8.2)
MCH RBC QN AUTO: 31.4 PG (ref 26–34)
MCHC RBC AUTO-ENTMCNC: 34.3 G/DL (ref 31–37)
MCV RBC AUTO: 91.5 FL
MONOCYTES # BLD AUTO: 0.49 X10(3) UL (ref 0.1–1)
MONOCYTES NFR BLD AUTO: 6.9 %
NEUTROPHILS # BLD AUTO: 4.57 X10 (3) UL (ref 1.5–7.7)
NEUTROPHILS # BLD AUTO: 4.57 X10(3) UL (ref 1.5–7.7)
NEUTROPHILS NFR BLD AUTO: 64.5 %
NONHDLC SERPL-MCNC: 127 MG/DL (ref ?–130)
OSMOLALITY SERPL CALC.SUM OF ELEC: 290 MOSM/KG (ref 275–295)
PATIENT FASTING Y/N/NP: YES
PATIENT FASTING Y/N/NP: YES
PLATELET # BLD AUTO: 225 10(3)UL (ref 150–450)
POTASSIUM SERPL-SCNC: 4.3 MMOL/L (ref 3.5–5.1)
RBC # BLD AUTO: 4.81 X10(6)UL
SODIUM SERPL-SCNC: 139 MMOL/L (ref 136–145)
TRIGL SERPL-MCNC: 79 MG/DL (ref 30–149)
TSI SER-ACNC: 1.83 MIU/ML (ref 0.36–3.74)
VIT B12 SERPL-MCNC: 382 PG/ML (ref 193–986)
VIT D+METAB SERPL-MCNC: 34.2 NG/ML (ref 30–100)
VLDLC SERPL CALC-MCNC: 14 MG/DL (ref 0–30)
WBC # BLD AUTO: 7.1 X10(3) UL (ref 4–11)

## 2021-08-09 PROCEDURE — 36415 COLL VENOUS BLD VENIPUNCTURE: CPT

## 2021-08-09 PROCEDURE — 85025 COMPLETE CBC W/AUTO DIFF WBC: CPT

## 2021-08-09 PROCEDURE — 82607 VITAMIN B-12: CPT

## 2021-08-09 PROCEDURE — 80061 LIPID PANEL: CPT

## 2021-08-09 PROCEDURE — 84443 ASSAY THYROID STIM HORMONE: CPT

## 2021-08-09 PROCEDURE — 82306 VITAMIN D 25 HYDROXY: CPT

## 2021-08-09 PROCEDURE — 80053 COMPREHEN METABOLIC PANEL: CPT

## 2021-08-15 NOTE — PROGRESS NOTES
Pop Loya -Overall your labs look good. Your cholesterol is mildly elevated but not significant to warrant medications.  Will review at your appointment - Dr. Mary Smith

## 2021-08-24 ENCOUNTER — OFFICE VISIT (OUTPATIENT)
Dept: FAMILY MEDICINE CLINIC | Facility: CLINIC | Age: 51
End: 2021-08-24
Payer: COMMERCIAL

## 2021-08-24 ENCOUNTER — TELEPHONE (OUTPATIENT)
Dept: FAMILY MEDICINE CLINIC | Facility: CLINIC | Age: 51
End: 2021-08-24

## 2021-08-24 ENCOUNTER — LAB ENCOUNTER (OUTPATIENT)
Dept: LAB | Age: 51
End: 2021-08-24
Attending: FAMILY MEDICINE
Payer: COMMERCIAL

## 2021-08-24 VITALS
TEMPERATURE: 97 F | BODY MASS INDEX: 23.08 KG/M2 | HEIGHT: 64 IN | SYSTOLIC BLOOD PRESSURE: 98 MMHG | DIASTOLIC BLOOD PRESSURE: 57 MMHG | HEART RATE: 75 BPM | WEIGHT: 135.19 LBS

## 2021-08-24 DIAGNOSIS — R14.0 ABDOMINAL BLOATING: ICD-10-CM

## 2021-08-24 DIAGNOSIS — Z00.00 ROUTINE MEDICAL EXAM: Primary | ICD-10-CM

## 2021-08-24 PROCEDURE — 3074F SYST BP LT 130 MM HG: CPT | Performed by: FAMILY MEDICINE

## 2021-08-24 PROCEDURE — 36415 COLL VENOUS BLD VENIPUNCTURE: CPT

## 2021-08-24 PROCEDURE — 86256 FLUORESCENT ANTIBODY TITER: CPT

## 2021-08-24 PROCEDURE — 86003 ALLG SPEC IGE CRUDE XTRC EA: CPT

## 2021-08-24 PROCEDURE — 82785 ASSAY OF IGE: CPT

## 2021-08-24 PROCEDURE — 83516 IMMUNOASSAY NONANTIBODY: CPT

## 2021-08-24 PROCEDURE — 3008F BODY MASS INDEX DOCD: CPT | Performed by: FAMILY MEDICINE

## 2021-08-24 PROCEDURE — 99396 PREV VISIT EST AGE 40-64: CPT | Performed by: FAMILY MEDICINE

## 2021-08-24 PROCEDURE — 3078F DIAST BP <80 MM HG: CPT | Performed by: FAMILY MEDICINE

## 2021-08-26 ENCOUNTER — LAB ENCOUNTER (OUTPATIENT)
Dept: LAB | Age: 51
End: 2021-08-26
Attending: FAMILY MEDICINE
Payer: COMMERCIAL

## 2021-08-26 DIAGNOSIS — R14.0 ABDOMINAL BLOATING: ICD-10-CM

## 2021-08-26 LAB
CLAM IGE QN: <0.1 KUA/L (ref ?–0.1)
CODFISH IGE QN: <0.1 KUA/L (ref ?–0.1)
CORN IGE QN: <0.1 KUA/L (ref ?–0.1)
COW MILK IGE QN: <0.1 KUA/L (ref ?–0.1)
EGG WHITE IGE QN: <0.1 KUA/L (ref ?–0.1)
IGE SERPL-ACNC: 11.3 KU/L (ref 2–214)
PEANUT IGE QN: <0.1 KUA/L (ref ?–0.1)
SCALLOP IGE QN: <0.1 KUA/L (ref ?–0.1)
SESAME SEED IGE QN: <0.1 KUA/L (ref ?–0.1)
SHRIMP IGE QN: <0.1 KUA/L (ref ?–0.1)
SOYBEAN IGE QN: <0.1 KUA/L (ref ?–0.1)
WALNUT IGE QN: <0.1 KUA/L (ref ?–0.1)
WHEAT IGE QN: <0.1 KUA/L (ref ?–0.1)

## 2021-08-26 PROCEDURE — 83013 H PYLORI (C-13) BREATH: CPT

## 2021-08-27 LAB
GLIADIN IGA SER-ACNC: 0.4 U/ML (ref ?–7)
GLIADIN IGG SER-ACNC: <0.4 U/ML (ref ?–7)
TTG IGA SER-ACNC: 0.1 U/ML (ref ?–7)
TTG IGG SER-ACNC: <0.6 U/ML (ref ?–7)

## 2021-08-28 LAB — H. PYLORI BREATH TEST: NEGATIVE

## 2021-09-20 ENCOUNTER — HOSPITAL ENCOUNTER (OUTPATIENT)
Dept: MAMMOGRAPHY | Facility: HOSPITAL | Age: 51
Discharge: HOME | End: 2021-09-20
Attending: OBSTETRICS & GYNECOLOGY
Payer: COMMERCIAL

## 2021-09-20 DIAGNOSIS — Z12.31 ENCOUNTER FOR SCREENING MAMMOGRAM FOR MALIGNANT NEOPLASM OF BREAST: ICD-10-CM

## 2021-09-20 PROCEDURE — 77063 BREAST TOMOSYNTHESIS BI: CPT | Performed by: OBSTETRICS & GYNECOLOGY

## 2021-09-20 PROCEDURE — 77067 SCR MAMMO BI INCL CAD: CPT | Performed by: OBSTETRICS & GYNECOLOGY

## 2021-10-06 ENCOUNTER — HOSPITAL ENCOUNTER (OUTPATIENT)
Dept: MAMMOGRAPHY | Facility: HOSPITAL | Age: 51
Discharge: HOME | End: 2021-10-06
Attending: OBSTETRICS & GYNECOLOGY
Payer: COMMERCIAL

## 2021-10-06 DIAGNOSIS — R92.8 ABNORMAL MAMMOGRAM: ICD-10-CM

## 2021-10-06 PROCEDURE — 77061 BREAST TOMOSYNTHESIS UNI: CPT | Performed by: OBSTETRICS & GYNECOLOGY

## 2021-10-06 PROCEDURE — 77065 DX MAMMO INCL CAD UNI: CPT | Performed by: OBSTETRICS & GYNECOLOGY

## 2021-10-06 NOTE — IMAGING NOTE
Discussed recommended breast biopsy with patient. Idalia Ben is being recommended for stereotactic biopsy of the left Breast x 2 sites by Dr. Starla Duran.    Pt history discussed as below:  INDICATIONS: R92.8 Abnormal mammogram         FINDINGS: Xiang Pack Take 1 packet by mouth daily as needed (Constipation). • Acidophilus/Pectin Oral Cap Take 1 capsule by mouth daily. • Magnesium (M2 MAGNESIUM) 100 MG Oral Cap Take 1 tablet by mouth daily.        • Calcium Citrate-Vitamin D (CITRACAL/VITAMIN D then be taken to assure correct placement of the marker. Educated the patient they will be awake during this procedure and are able to drive themselves home if they wish. Educated patient that some soreness may occur after biopsy.   Discussed use of a

## 2021-10-11 ENCOUNTER — TELEPHONE (OUTPATIENT)
Dept: ULTRASOUND IMAGING | Facility: HOSPITAL | Age: 51
End: 2021-10-11

## 2021-10-11 NOTE — TELEPHONE ENCOUNTER
Follow up call  Tena Lynne cancelled appointment for Wednesday. She verbalizes she rescheudle for next Tuesday. Her mom was hospitalized after a fall while on Plavix.  He rmom is at [facility] and she verbalizes she has to be with her due to mom has de

## 2021-10-19 ENCOUNTER — HOSPITAL ENCOUNTER (OUTPATIENT)
Dept: MAMMOGRAPHY | Facility: HOSPITAL | Age: 51
Discharge: HOME | End: 2021-10-19
Attending: OBSTETRICS & GYNECOLOGY
Payer: COMMERCIAL

## 2021-10-19 DIAGNOSIS — R92.1 BREAST CALCIFICATION, LEFT: ICD-10-CM

## 2021-10-19 PROCEDURE — 88305 TISSUE EXAM BY PATHOLOGIST: CPT

## 2021-10-19 PROCEDURE — 88360 TUMOR IMMUNOHISTOCHEM/MANUAL: CPT

## 2021-10-19 PROCEDURE — 88342 IMHCHEM/IMCYTCHM 1ST ANTB: CPT

## 2021-10-19 PROCEDURE — 88377 M/PHMTRC ALYS ISHQUANT/SEMIQ: CPT | Performed by: FAMILY MEDICINE

## 2021-10-19 PROCEDURE — 19081 BX BREAST 1ST LESION STRTCTC: CPT | Performed by: OBSTETRICS & GYNECOLOGY

## 2021-10-19 PROCEDURE — 19082 BX BREAST ADD LESION STRTCTC: CPT | Performed by: OBSTETRICS & GYNECOLOGY

## 2021-10-19 NOTE — PROCEDURES
[facility] HOSP - [facility]  Procedure Note    Charlotte Shows Patient Status:  Outpatient    1970 MRN [id number]   Location [address] [facility] Attending Ale Hong 150 Day # 0 PCP Maximiliano Reyes MD     Proced

## 2021-10-21 ENCOUNTER — TELEPHONE (OUTPATIENT)
Dept: HEMATOLOGY/ONCOLOGY | Facility: HOSPITAL | Age: 51
End: 2021-10-21

## 2021-10-21 DIAGNOSIS — R92.2 BREAST DENSITY: ICD-10-CM

## 2021-10-21 DIAGNOSIS — C50.912 INVASIVE DUCTAL CARCINOMA OF LEFT BREAST IN FEMALE (HCC): Primary | ICD-10-CM

## 2021-10-21 NOTE — TELEPHONE ENCOUNTER
Phoned patient for continued care coordination. Patient shares her preference to meet with Dr. Axel Dorantes and Dr. Janet Cole.     Shared with patient I will reach out to these office for appointment availability, and follow up with her with additional information

## 2021-10-21 NOTE — TELEPHONE ENCOUNTER
Patient is s/p left breast biopsy, performed 10/19/21 at [facility]. Pathology results are as follows:    A.  Left breast, upper outer; mammotome stereotactic core biopsies:   Multiple fragments of breast parenchyma with multifocal small areas of infiltrating adamaris to have her care at [facility].  She has a close friend who was treated here. Asked patient to please communicate with her friend and share her physician preference. Provided patient with my contact information.   She is aware of my continued fol

## 2021-10-21 NOTE — TELEPHONE ENCOUNTER
Phoned patient with appointment information. Appointments scheduled as follows:    10/26/21 Breast MRI at noon  10/28/21 Dr. Keeley Castorena at 4pm  10/29/21 Dr. Jolie Schneider at [facility] patient as to MRI and what to expect. Provided all appointment information.   P

## 2021-10-25 ENCOUNTER — TELEPHONE (OUTPATIENT)
Dept: HEMATOLOGY/ONCOLOGY | Facility: HOSPITAL | Age: 51
End: 2021-10-25

## 2021-10-25 ENCOUNTER — NURSE NAVIGATOR ENCOUNTER (OUTPATIENT)
Dept: HEMATOLOGY/ONCOLOGY | Facility: HOSPITAL | Age: 51
End: 2021-10-25

## 2021-10-25 NOTE — TELEPHONE ENCOUNTER
BREANNE received from patient requesting medication to assist with her tolerance of breast MRI scheduled for tomorrow.   Patient shares her pharmacy is the [facility] in [address] [place] [address].

## 2021-10-26 ENCOUNTER — HOSPITAL ENCOUNTER (OUTPATIENT)
Dept: MRI IMAGING | Facility: HOSPITAL | Age: 51
Discharge: HOME | End: 2021-10-26
Attending: SURGERY
Payer: COMMERCIAL

## 2021-10-26 DIAGNOSIS — C50.912 INVASIVE DUCTAL CARCINOMA OF LEFT BREAST IN FEMALE (HCC): ICD-10-CM

## 2021-10-26 DIAGNOSIS — R92.2 BREAST DENSITY: ICD-10-CM

## 2021-10-26 PROCEDURE — A9575 INJ GADOTERATE MEGLUMI 0.1ML: HCPCS | Performed by: SURGERY

## 2021-10-26 PROCEDURE — 77049 MRI BREAST C-+ W/CAD BI: CPT | Performed by: SURGERY

## 2021-10-28 ENCOUNTER — OFFICE VISIT (OUTPATIENT)
Dept: SURGERY | Facility: CLINIC | Age: 51
End: 2021-10-28
Payer: COMMERCIAL

## 2021-10-28 VITALS
HEIGHT: 64 IN | OXYGEN SATURATION: 98 % | HEART RATE: 73 BPM | DIASTOLIC BLOOD PRESSURE: 77 MMHG | RESPIRATION RATE: 16 BRPM | SYSTOLIC BLOOD PRESSURE: 115 MMHG | WEIGHT: 134 LBS | BODY MASS INDEX: 22.88 KG/M2 | TEMPERATURE: 98 F

## 2021-10-28 DIAGNOSIS — C50.412 MALIGNANT NEOPLASM OF UPPER-OUTER QUADRANT OF LEFT BREAST IN FEMALE, ESTROGEN RECEPTOR POSITIVE (HCC): Primary | ICD-10-CM

## 2021-10-28 DIAGNOSIS — Z17.0 MALIGNANT NEOPLASM OF UPPER-OUTER QUADRANT OF LEFT BREAST IN FEMALE, ESTROGEN RECEPTOR POSITIVE (HCC): Primary | ICD-10-CM

## 2021-10-28 PROCEDURE — 3078F DIAST BP <80 MM HG: CPT | Performed by: SURGERY

## 2021-10-28 PROCEDURE — 99245 OFF/OP CONSLTJ NEW/EST HI 55: CPT | Performed by: SURGERY

## 2021-10-28 PROCEDURE — 3074F SYST BP LT 130 MM HG: CPT | Performed by: SURGERY

## 2021-10-28 PROCEDURE — 3008F BODY MASS INDEX DOCD: CPT | Performed by: SURGERY

## 2021-10-28 RX ORDER — MULTIVIT WITH MINERALS/LUTEIN
1000 TABLET ORAL DAILY
COMMUNITY

## 2021-10-28 NOTE — PROGRESS NOTES
Breast Surgery New Patient Consultation    This is the first visit for this 46year old woman, referred by Dr. Echo Coleman, who presents for evaluation of breast cancer.     History of Present Illness:   Ms. Tracy Arnold is a 46year old woman who presents wi enhancement measure less then the suspicious calcifications seen on mammogram which measured at least 4.8 cm in AP dimension. She is here today for evaluation and recommendations for further therapy.         Past Medical History:   Diagnosis Date   • Diver phlegm, hemoptysis, pleurisy/chest pain, pneumonia, asthma, wheezing, difficulty in breathing with exertion, emphysema, chronic bronchitis, shortness of breath or abnormal sound when breathing. Cardiovascular:   There is no history of chest pain, chest Allergic/Immunologic:  There is no history of hives, hay fever, angioedema or anaphylaxis.     /77 (BP Location: Right arm, Patient Position: Sitting, Cuff Size: adult)   Pulse 73   Temp 98.3 °F (36.8 °C) (Oral)   Resp 16   Ht 1.626 m (5' 4\")   W deformity, cyanosis or edema. Impression:   Ms. Charlotte Bermudez is a 46year old woman presents with imaging detected left breast cancer, clinical stage T2 NX MX.     Discussion and Plan:  I had a discussion with the Patient regarding her breast exam. procedure were explained to the patient and her family and she understood and agreed to the proposed plan. She was given ample opportunity for questions and those questions were answered to her satisfaction.  She has been  encouraged to contact the office w

## 2021-10-28 NOTE — PATIENT INSTRUCTIONS
Dr. Ge Clemens  Tel: [phone]  Fax: [address]  [address] [facility] [place] [address]  [phone]     Surgery/Procedure: Bilateral nipple sparing mastectomy, left lymphoscintigraphy, left sentinel lymph node b 72 hours prior to surgery. 10. The Pre-Admission Testing Department will call the day before to confirm your procedure, give you the time you need to arrive by and directions on where to go.  They begin making calls after 2pm, if you are not contacted by 4

## 2021-10-29 ENCOUNTER — TELEPHONE (OUTPATIENT)
Dept: FAMILY MEDICINE CLINIC | Facility: CLINIC | Age: 51
End: 2021-10-29

## 2021-10-29 ENCOUNTER — OFFICE VISIT (OUTPATIENT)
Dept: HEMATOLOGY/ONCOLOGY | Facility: HOSPITAL | Age: 51
End: 2021-10-29
Attending: INTERNAL MEDICINE
Payer: COMMERCIAL

## 2021-10-29 ENCOUNTER — NURSE NAVIGATOR ENCOUNTER (OUTPATIENT)
Dept: HEMATOLOGY/ONCOLOGY | Facility: HOSPITAL | Age: 51
End: 2021-10-29

## 2021-10-29 VITALS
DIASTOLIC BLOOD PRESSURE: 73 MMHG | SYSTOLIC BLOOD PRESSURE: 116 MMHG | TEMPERATURE: 99 F | WEIGHT: 134 LBS | HEIGHT: 64 IN | OXYGEN SATURATION: 99 % | BODY MASS INDEX: 22.88 KG/M2 | HEART RATE: 71 BPM | RESPIRATION RATE: 16 BRPM

## 2021-10-29 DIAGNOSIS — C50.412 MALIGNANT NEOPLASM OF UPPER-OUTER QUADRANT OF LEFT BREAST IN FEMALE, ESTROGEN RECEPTOR POSITIVE (HCC): Primary | ICD-10-CM

## 2021-10-29 DIAGNOSIS — Z17.0 MALIGNANT NEOPLASM OF UPPER-OUTER QUADRANT OF LEFT BREAST IN FEMALE, ESTROGEN RECEPTOR POSITIVE (HCC): Primary | ICD-10-CM

## 2021-10-29 PROCEDURE — 99245 OFF/OP CONSLTJ NEW/EST HI 55: CPT | Performed by: INTERNAL MEDICINE

## 2021-10-29 NOTE — PROGRESS NOTES
Patient presents to the [facility] for consultation with Dr. Candice Elmore. She is accompanied by her sister, Moni Luna. Introduced myself as Breast RN Navigator.   Shared my role to provide support and education, assist with her care coordination, as well as con

## 2021-10-29 NOTE — TELEPHONE ENCOUNTER
Need Surgery Clearance for bilateral Breast Masectomy and cancer is on the L side - by Dr Kelsey Echavarria - phone # [phone]- fax  # [phone]. No surgery date as of yet, but it will be done in the next few weeks, per pt. Pt. Needs to sched pre-op appt. For sometime next week, which is sooner then 1st avail. ,

## 2021-10-29 NOTE — CONSULTS
HPI     Arun Dougherty is a 46year old female here at the request of Dionicio Colmenares MD for evaluation of Malignant neoplasm of upper-outer quadrant of left breast in female, estrogen receptor positive (hcc)  (primary encounter diagnosis)    Patient calcifications seen on mammogram which measured at least 4.8 cm in AP dimension. Note on the right breast probably benign finding for which MR was recommended in 6 months. Patient was evaluated yesterday by Dr. Jose Duran for surgical management.   Patient wi Hematological: Negative for adenopathy. Psychiatric/Behavioral: Positive for sleep disturbance. Current Outpatient Medications   Medication Sig Dispense Refill   • Ascorbic Acid (VITAMIN C) 1000 MG Oral Tab Take 1,000 mg by mouth daily.      • K BMI 23.00 kg/m²   Wt Readings from Last 6 Encounters:  10/29/21 : 60.8 kg (134 lb)  10/28/21 : 60.8 kg (134 lb)  08/24/21 : 61.3 kg (135 lb 3.2 oz)  08/17/20 : 59.9 kg (132 lb)  07/16/19 : 58.5 kg (129 lb)  06/23/18 : 56.7 kg (125 lb)    Physical Exam  Gen final size of tumor, which per revised NCCN guidelines is now if greater than 5 cm, radiation therapy would be recommended. This will be again determined after patient completes her surgical management. Patient's family history was reviewed.   On her pa placed in this encounter. Results From Past 48 Hours:  No results found for this or any previous visit (from the past 48 hour(s)).       Imaging & Referrals:  OP REFERRAL TO GENETIC COUNSELOR   Orders Placed This Encounter      Genetic Counselor Referr 8.2 g/dL 7.4   Albumin      3.4 - 5.0 g/dL 4.0   Globulin      2.8 - 4.4 g/dL 3.4   A/G Ratio      1.0 - 2.0 1.2   Patient Fasting?        Yes       MRI BREAST (W+WO) W/CAD BILAT (JDF=[id number]) [[id number]]Collected: 10/26/21 1904 Order Status: CompletedUpdated abnormal left axillary or left    internal mammary lymph nodes are seen.              Impression:  CONCLUSION:    1. 2.0 x 1.6 x 2.7 centimeter area of heterogeneous mass and non mass enhancement in the left breast upper outer quadrant corresponds to the bi Pathologist:           Eladio Marquis MD                                                             Specimen:    Breast, left, [id number]                                                              Embedded Images --    Ancillary Horace Glover centromeric region of chromosome 17 (SpectrumGreen) were hybridized according to the ’s guidelines. At least 20 non-overlapping nuclei containing at least one orange and one green signal were enumerated.   The ratio of orange signals(HER-2 gene STEREO W/RENEE GUIDE 2 SITE LEFT (CPT=19081/19007)      COMPARISON: [facility], [facility] RENEE 2D+3D DIAGNOSTIC [facility] BILAT (YEI=[id number]), 9/16/2020, 6:48 AM.  [facility], [facility], [facility] RENEE 2D+3D DIAGNOSTIC [facility] BILAT (CP ductal carcinoma in situ, cribriform and comedo type, with comedo necrosis. * Largest tumor area in the tissue submitted is measuring 0.6 cm in greatest dimension.    * Remaining breast tissue with fibrocystic changes with associated multifocal microcalci Specimens:   A) - Breast, left, cores left upper outer breast with imaging top hat clip                          architectural distortion/calcs                                                                      B) - Breast, left, cores The specimen will be submitted for HER-2/austin analysis by [facility].    ASCO/CAP Scoring Criteria:  ER/PgR:    > 1% (Positive), Intensity of staining (weak, moderate, strong)  <1% (Negative) Internal control present and ER/[place] positive/negative  HER-2/austin:  0 ( breast with imaging - cork clip\" received in formalin. The specimen consists of multiple fragments of fibrofatty tissue measuring in aggregate 2.9 x 2.5 x 0.4 cm. The specimen is submitted entirely in cassette B1.       The specimen was collected from the in situ, cribriform and comedo type, with comedo necrosis. · Largest tumor area in the tissue submitted is measuring 0.6 cm in greatest dimension.   · Remaining breast tissue with fibrocystic changes with associated multifocal microcalcification and atypic Favorable, 10-20% Borderline, % Unfavorable     The tissue that has been submitted for tumor markers by manual quantitative and semi quantitative analysis was fixed in 10% neutral buffered formalin, following the recommended CAP guidelines time fixat DIAGNOSTIC ADDL VWS LEFT (NLJ=51512/95023) [[id number]]Collected: 10/06/21 0744 Order Status: CompletedUpdated: 10/06/21 0854 Narrative:  PROCEDURE: YE DURAN 2D+3D DIAGNOSTIC ADDL VWS  LEFT (CPT=77065/04551)      COMPARISON: [facility] for [facility] next mammogram has been entered into a reminder system.        Patient received a discharge summary from the technologist after completion of exam.      Breast marker legend used on images    Triangle = Palpable lump   [place] = Skin tag or mole   BB = Nippl complete evaluation. BI-RADS CATEGORY:     DIAGNOSTIC CATEGORY 0--INCOMPLETE ASSESSMENT: NEED ADDITIONAL IMAGING EVALUATION.        RECOMMENDATIONS:   ADDITIONAL MAMMOGRAPHIC VIEWS REQUIRED: LEFT BREAST  --We will call the patient back for additional v

## 2021-11-02 ENCOUNTER — GENETICS ENCOUNTER (OUTPATIENT)
Dept: GENETICS | Facility: HOSPITAL | Age: 51
End: 2021-11-02
Attending: GENETIC COUNSELOR, MS
Payer: COMMERCIAL

## 2021-11-02 ENCOUNTER — OFFICE VISIT (OUTPATIENT)
Dept: SURGERY | Facility: CLINIC | Age: 51
End: 2021-11-02
Payer: COMMERCIAL

## 2021-11-02 ENCOUNTER — NURSE ONLY (OUTPATIENT)
Dept: HEMATOLOGY/ONCOLOGY | Facility: HOSPITAL | Age: 51
End: 2021-11-02
Attending: GENETIC COUNSELOR, MS
Payer: COMMERCIAL

## 2021-11-02 VITALS
OXYGEN SATURATION: 96 % | SYSTOLIC BLOOD PRESSURE: 128 MMHG | BODY MASS INDEX: 22.53 KG/M2 | RESPIRATION RATE: 16 BRPM | HEIGHT: 64 IN | WEIGHT: 132 LBS | DIASTOLIC BLOOD PRESSURE: 80 MMHG | HEART RATE: 77 BPM

## 2021-11-02 DIAGNOSIS — C50.412 MALIGNANT NEOPLASM OF UPPER-OUTER QUADRANT OF LEFT BREAST IN FEMALE, ESTROGEN RECEPTOR POSITIVE (HCC): Primary | ICD-10-CM

## 2021-11-02 DIAGNOSIS — Z17.0 MALIGNANT NEOPLASM OF UPPER-OUTER QUADRANT OF LEFT BREAST IN FEMALE, ESTROGEN RECEPTOR POSITIVE (HCC): Primary | ICD-10-CM

## 2021-11-02 PROCEDURE — 3074F SYST BP LT 130 MM HG: CPT | Performed by: SURGERY

## 2021-11-02 PROCEDURE — 96040 HC GENETIC COUNSELING EA 30 MIN: CPT | Performed by: GENETIC COUNSELOR, MS

## 2021-11-02 PROCEDURE — 99243 OFF/OP CNSLTJ NEW/EST LOW 30: CPT | Performed by: SURGERY

## 2021-11-02 PROCEDURE — 3079F DIAST BP 80-89 MM HG: CPT | Performed by: SURGERY

## 2021-11-02 PROCEDURE — 36415 COLL VENOUS BLD VENIPUNCTURE: CPT

## 2021-11-02 PROCEDURE — 3008F BODY MASS INDEX DOCD: CPT | Performed by: SURGERY

## 2021-11-02 RX ORDER — MULTIVIT-MIN/IRON/FOLIC ACID/K 18-600-40
CAPSULE ORAL
COMMUNITY
Start: 2019-07-01

## 2021-11-02 NOTE — PROGRESS NOTES
Referring Provider:  Ivan Armendariz MD    Additional Provider(s):  MD Dinah Hannon MD    Reason for Referral:  Siddhartha Byrne was referred for genetic counseling because of a recent diagnosis of breast cancer.   Ms. Albino Cruz is a 46 discussed with Ms. Cruz. Genetics of Breast Cancer: In the [place], approximately 1 in 8 women will develop breast cancer.   Although the majority of breast cancer cases are sporadic, approximately 5-10% of women with breast cancer have a he If a pathogenic variant is not identified (negative result), it is still possible that Ms. Cruz has a pathogenic variant in one of these genes that was not detected by the genetic test, or that the family is dealing with a hereditary cancer syndrom Screening and Prevention Options for BRCA1/2 mutation carriers:   The lifetime risk for breast cancer in a BRCA1/2 mutation carrier is up to 60-80%, with up to a 40-60% lifetime risk for a contralateral breast cancer after an initial breast cancer diagnosis communicated to Dr. Lucho Oliver, Dr. Merle Saul, and Dr. Arnie Jean    Approximately 30 minutes was spent in consultation with Ms. Cruz.

## 2021-11-02 NOTE — PATIENT INSTRUCTIONS
Surgeon:         Dr. Morena Vargas                                        Tel:         [phone]                                  Fax:        [phone]    Surgery/Procedure:  Immediate breast reconstruction with placement of bilateral breast tissue ex

## 2021-11-02 NOTE — CONSULTS
New Patient Consultation    This is the first visit for this 46year old female who presents to discuss reconstructive options following surgery for breast cancer. History of Present Illness:    The patient is a 46year old female who presents with a lef HIVES      Family History:   Family History   Problem Relation Age of Onset   • Breast Cancer Mother 76   • Neurological Disorder Mother         Cerebral Aneurysm   • Dementia Mother    • Heart Disease Father         CAD   • Prostate Cancer Father 80   • D night to urinate, urinary hesitancy or retaining urine, painful urination, urinary incontinence, decreased urine stream, blood in urine, or vaginal/penile discharge.     Skin:  Then patient denies rash, itching, skin lesions, dry skin, change in skin color and areola appear normal.  There is no nipple retraction or discharge. There are no dominant masses in the breast.     Left breast:  Grade 1 ptosis with mild pseudoptosis is noted. Striae are absent.   Measurements:  sternal notch to nipple 20cm, nipple t healing, nipple areolar necrosis, seroma, asymmetry, implant infection/extrusion requiring removal, injury to adjacent structures, capsular contracture, ALCL, need for implant exchange, and need for further surgery were reviewed.  We also discussed the poss

## 2021-11-03 ENCOUNTER — OFFICE VISIT (OUTPATIENT)
Dept: FAMILY MEDICINE CLINIC | Facility: CLINIC | Age: 51
End: 2021-11-03
Payer: COMMERCIAL

## 2021-11-03 ENCOUNTER — EKG ENCOUNTER (OUTPATIENT)
Dept: LAB | Age: 51
End: 2021-11-03
Attending: FAMILY MEDICINE
Payer: COMMERCIAL

## 2021-11-03 ENCOUNTER — LAB ENCOUNTER (OUTPATIENT)
Dept: LAB | Age: 51
End: 2021-11-03
Attending: FAMILY MEDICINE
Payer: COMMERCIAL

## 2021-11-03 VITALS
TEMPERATURE: 97 F | HEART RATE: 78 BPM | WEIGHT: 131.81 LBS | DIASTOLIC BLOOD PRESSURE: 65 MMHG | SYSTOLIC BLOOD PRESSURE: 94 MMHG | BODY MASS INDEX: 22.5 KG/M2 | HEIGHT: 64 IN

## 2021-11-03 DIAGNOSIS — Z01.818 PREOP EXAMINATION: Primary | ICD-10-CM

## 2021-11-03 DIAGNOSIS — Z01.818 PREOP EXAMINATION: ICD-10-CM

## 2021-11-03 DIAGNOSIS — C50.919 INFILTRATING DUCT CARCINOMA (HCC): ICD-10-CM

## 2021-11-03 PROCEDURE — 3074F SYST BP LT 130 MM HG: CPT | Performed by: FAMILY MEDICINE

## 2021-11-03 PROCEDURE — 93010 ELECTROCARDIOGRAM REPORT: CPT | Performed by: FAMILY MEDICINE

## 2021-11-03 PROCEDURE — 99214 OFFICE O/P EST MOD 30 MIN: CPT | Performed by: FAMILY MEDICINE

## 2021-11-03 PROCEDURE — 85025 COMPLETE CBC W/AUTO DIFF WBC: CPT

## 2021-11-03 PROCEDURE — 81001 URINALYSIS AUTO W/SCOPE: CPT

## 2021-11-03 PROCEDURE — 80048 BASIC METABOLIC PNL TOTAL CA: CPT

## 2021-11-03 PROCEDURE — 3078F DIAST BP <80 MM HG: CPT | Performed by: FAMILY MEDICINE

## 2021-11-03 PROCEDURE — 93005 ELECTROCARDIOGRAM TRACING: CPT

## 2021-11-03 PROCEDURE — 90686 IIV4 VACC NO PRSV 0.5 ML IM: CPT | Performed by: FAMILY MEDICINE

## 2021-11-03 PROCEDURE — 3008F BODY MASS INDEX DOCD: CPT | Performed by: FAMILY MEDICINE

## 2021-11-03 PROCEDURE — 36415 COLL VENOUS BLD VENIPUNCTURE: CPT

## 2021-11-03 PROCEDURE — 90471 IMMUNIZATION ADMIN: CPT | Performed by: FAMILY MEDICINE

## 2021-11-03 NOTE — PROGRESS NOTES
Charlotte Bermudez is a 46year old female. Patient presents with:  Pre-Op Exam: breast, Dr. Jaiden Sterling    HPI:   Recent diagnosis of infiltrating ductal carcinoma. Plans for bilateral mastectomy.  Met with Dr. Teresa Rose and Dr. Jennie Mc and Dr. Rita Aguirre for re (Temporal)   Ht 5' 4\" (1.626 m)   Wt 131 lb 12.8 oz (59.8 kg)   LMP 10/22/2021   BMI 22.62 kg/m²   GENERAL: well developed, well nourished,in no apparent distress  SKIN: no rashes,no suspicious lesions  HEENT: atraumatic, normocephalic,ears and throat are

## 2021-11-04 ENCOUNTER — TELEPHONE (OUTPATIENT)
Dept: FAMILY MEDICINE CLINIC | Facility: CLINIC | Age: 51
End: 2021-11-04

## 2021-11-04 NOTE — PROGRESS NOTES
Normal EKG.  Can schedule surgery. - Dr. Melody Queen
[email or web address]

## 2021-11-04 NOTE — TELEPHONE ENCOUNTER
Patient is requesting call back regarding lab results she completed yesterday 11/03/2021 for Dr. Daren Chau.

## 2021-11-04 NOTE — PROGRESS NOTES
Pop Loya - Labs were normal but did have blood with normal red blood cells in the urine so not true hematuria.  Megan Galloway for surgery. - Dr. Phil Park

## 2021-11-04 NOTE — TELEPHONE ENCOUNTER
Advised patient of Dr. Radha Davis notes. Patient verbalized understanding. Normal EKG.  Can schedule surgery. - Dr. Gilmar Terrazas   Written by Phil Miner MD on 11/4/2021  4:18 PM CDT         [facility] - Labs were normal but did have blood with normal red blood

## 2021-11-04 NOTE — TELEPHONE ENCOUNTER
Advised patient that test results now post immediately to [employer]. Provider may not have the chance to review or comment on them before the results reach her. Our providers review and comment on all test results, normal or otherwise.    Patient wa

## 2021-11-09 ENCOUNTER — PATIENT OUTREACH (OUTPATIENT)
Dept: HEMATOLOGY/ONCOLOGY | Facility: HOSPITAL | Age: 51
End: 2021-11-09

## 2021-11-09 ENCOUNTER — TELEPHONE (OUTPATIENT)
Dept: HEMATOLOGY/ONCOLOGY | Facility: HOSPITAL | Age: 51
End: 2021-11-09

## 2021-11-09 NOTE — TELEPHONE ENCOUNTER
Patient phones with questions concerning a date for her surgery. Shared with patient that Dr. Lucho Oliver and Dr. Miguel Ordonez and their Team is actively working on this. Shared with patient that as soon as we have a date we will contact her.     Patient with Hay Garces

## 2021-11-10 ENCOUNTER — GENETICS ENCOUNTER (OUTPATIENT)
Dept: HEMATOLOGY/ONCOLOGY | Facility: HOSPITAL | Age: 51
End: 2021-11-10

## 2021-11-10 NOTE — PROGRESS NOTES
Referring Provider:                    Koby Adrian MD     Additional Provider(s):              Art Darby, MD Sherrine Griffith, MD Orvan Sandifer for Referral:  Juno Mckoy had genetic testing perform unexplained. The limitations of the testing include the chance that a pathogenic variant in a gene other than those included in this panel might be the cause of cancer in Ms. Cruz. It is not known if the NBN or POLD1 variants identified in Ms. Lee

## 2021-11-11 ENCOUNTER — TELEPHONE (OUTPATIENT)
Dept: SURGERY | Facility: CLINIC | Age: 51
End: 2021-11-11

## 2021-11-11 DIAGNOSIS — Z17.0 MALIGNANT NEOPLASM OF UPPER-OUTER QUADRANT OF LEFT BREAST IN FEMALE, ESTROGEN RECEPTOR POSITIVE (HCC): Primary | ICD-10-CM

## 2021-11-11 DIAGNOSIS — C50.412 MALIGNANT NEOPLASM OF UPPER-OUTER QUADRANT OF LEFT BREAST IN FEMALE, ESTROGEN RECEPTOR POSITIVE (HCC): Primary | ICD-10-CM

## 2021-11-11 NOTE — TELEPHONE ENCOUNTER
Calling pt in regards to scheduling surgery. Informed pt that I have 11/18/21 available but it would be at [facility] with Dr. Lucho Oliver and Dr. Miguel Ordonez.  Pt states that she would prefer her surgery to be at [facility]. Informed pt that I have 12/1

## 2021-11-15 ENCOUNTER — PATIENT MESSAGE (OUTPATIENT)
Dept: HEMATOLOGY/ONCOLOGY | Facility: HOSPITAL | Age: 51
End: 2021-11-15

## 2021-11-15 NOTE — TELEPHONE ENCOUNTER
Phoned patient to discuss preoperative education. Patient shares she has received information I have sent to her. She has watched the SONIA drain video several times.   Patient shares her mother is currently in hospice care and she is focused on spending as

## 2021-11-19 ENCOUNTER — OFFICE VISIT (OUTPATIENT)
Dept: PHYSICAL THERAPY | Facility: HOSPITAL | Age: 51
End: 2021-11-19
Attending: SURGERY
Payer: COMMERCIAL

## 2021-11-19 NOTE — PROGRESS NOTES
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

## 2021-11-28 ENCOUNTER — LAB ENCOUNTER (OUTPATIENT)
Dept: LAB | Facility: HOSPITAL | Age: 51
End: 2021-11-28
Attending: SURGERY
Payer: COMMERCIAL

## 2021-11-28 DIAGNOSIS — Z01.818 PREOP TESTING: ICD-10-CM

## 2021-11-30 ENCOUNTER — TELEPHONE (OUTPATIENT)
Dept: HEMATOLOGY/ONCOLOGY | Facility: HOSPITAL | Age: 51
End: 2021-11-30

## 2021-11-30 NOTE — TELEPHONE ENCOUNTER
Patient is scheduled for bilateral nipple sparing mastectomy, left sentinel lymph node biopsy, possible left axillary lymph node dissection with immediate reconstruction with tissue expander placement tomorrow with Dr. Nils Hamm and Dr. Steve Hoyos.     Phoned matthias

## 2021-12-01 ENCOUNTER — HOSPITAL ENCOUNTER (OUTPATIENT)
Facility: HOSPITAL | Age: 51
Discharge: HOME | End: 2021-12-02
Attending: SURGERY | Admitting: SURGERY
Payer: COMMERCIAL

## 2021-12-01 ENCOUNTER — HOSPITAL ENCOUNTER (OUTPATIENT)
Dept: NUCLEAR MEDICINE | Facility: HOSPITAL | Age: 51
Discharge: HOME | End: 2021-12-01
Attending: SURGERY
Payer: COMMERCIAL

## 2021-12-01 ENCOUNTER — ANESTHESIA EVENT (OUTPATIENT)
Dept: SURGERY | Facility: HOSPITAL | Age: 51
End: 2021-12-01
Payer: COMMERCIAL

## 2021-12-01 ENCOUNTER — ANESTHESIA (OUTPATIENT)
Dept: SURGERY | Facility: HOSPITAL | Age: 51
End: 2021-12-01
Payer: COMMERCIAL

## 2021-12-01 DIAGNOSIS — Z01.818 PREOP TESTING: Primary | ICD-10-CM

## 2021-12-01 DIAGNOSIS — C50.412 MALIGNANT NEOPLASM OF UPPER-OUTER QUADRANT OF LEFT BREAST IN FEMALE, ESTROGEN RECEPTOR POSITIVE (HCC): ICD-10-CM

## 2021-12-01 DIAGNOSIS — Z17.0 MALIGNANT NEOPLASM OF UPPER-OUTER QUADRANT OF LEFT BREAST IN FEMALE, ESTROGEN RECEPTOR POSITIVE (HCC): ICD-10-CM

## 2021-12-01 PROCEDURE — 76942 ECHO GUIDE FOR BIOPSY: CPT | Performed by: ANESTHESIOLOGY

## 2021-12-01 PROCEDURE — 0HUV0KZ SUPPLEMENT BILATERAL BREAST WITH NONAUTOLOGOUS TISSUE SUBSTITUTE, OPEN APPROACH: ICD-10-PCS | Performed by: SURGERY

## 2021-12-01 PROCEDURE — S0077 INJECTION, CLINDAMYCIN PHOSP: HCPCS | Performed by: REGISTERED NURSE

## 2021-12-01 PROCEDURE — 4A1GXSH MONITORING OF SKIN AND BREAST VASCULAR PERFUSION USING INDOCYANINE GREEN DYE, EXTERNAL APPROACH: ICD-10-PCS | Performed by: SURGERY

## 2021-12-01 PROCEDURE — 78195 LYMPH SYSTEM IMAGING: CPT | Performed by: SURGERY

## 2021-12-01 PROCEDURE — 0HHV0NZ INSERTION OF TISSUE EXPANDER INTO BILATERAL BREAST, OPEN APPROACH: ICD-10-PCS | Performed by: SURGERY

## 2021-12-01 PROCEDURE — 0HTV0ZZ RESECTION OF BILATERAL BREAST, OPEN APPROACH: ICD-10-PCS | Performed by: SURGERY

## 2021-12-01 PROCEDURE — 07B60ZX EXCISION OF LEFT AXILLARY LYMPHATIC, OPEN APPROACH, DIAGNOSTIC: ICD-10-PCS | Performed by: SURGERY

## 2021-12-01 PROCEDURE — 99203 OFFICE O/P NEW LOW 30 MIN: CPT | Performed by: HOSPITALIST

## 2021-12-01 PROCEDURE — 3E0W3KZ INTRODUCTION OF OTHER DIAGNOSTIC SUBSTANCE INTO LYMPHATICS, PERCUTANEOUS APPROACH: ICD-10-PCS | Performed by: SURGERY

## 2021-12-01 DEVICE — MATRIX ALLODERM SELECT MEDIUM: Type: IMPLANTABLE DEVICE | Site: BREAST | Status: FUNCTIONAL

## 2021-12-01 RX ORDER — INDOCYANINE GREEN AND WATER 25 MG
KIT INJECTION AS NEEDED
Status: DISCONTINUED | OUTPATIENT
Start: 2021-12-01 | End: 2021-12-01 | Stop reason: SURG

## 2021-12-01 RX ORDER — DIPHENHYDRAMINE HYDROCHLORIDE 50 MG/ML
INJECTION INTRAMUSCULAR; INTRAVENOUS AS NEEDED
Status: DISCONTINUED | OUTPATIENT
Start: 2021-12-01 | End: 2021-12-01 | Stop reason: SURG

## 2021-12-01 RX ORDER — ACETAMINOPHEN 500 MG
1000 TABLET ORAL EVERY 6 HOURS PRN
Status: DISCONTINUED | OUTPATIENT
Start: 2021-12-01 | End: 2021-12-02

## 2021-12-01 RX ORDER — MORPHINE SULFATE 2 MG/ML
1 INJECTION, SOLUTION INTRAMUSCULAR; INTRAVENOUS EVERY 2 HOUR PRN
Status: DISCONTINUED | OUTPATIENT
Start: 2021-12-01 | End: 2021-12-02

## 2021-12-01 RX ORDER — DOCUSATE SODIUM 100 MG/1
100 CAPSULE, LIQUID FILLED ORAL 2 TIMES DAILY
Qty: 40 CAPSULE | Refills: 0 | Status: SHIPPED | OUTPATIENT
Start: 2021-12-01 | End: 2022-01-14 | Stop reason: ALTCHOICE

## 2021-12-01 RX ORDER — ROCURONIUM BROMIDE 10 MG/ML
INJECTION, SOLUTION INTRAVENOUS AS NEEDED
Status: DISCONTINUED | OUTPATIENT
Start: 2021-12-01 | End: 2021-12-01 | Stop reason: SURG

## 2021-12-01 RX ORDER — METHYLENE BLUE 10 MG/ML
INJECTION INTRAVENOUS AS NEEDED
Status: DISCONTINUED | OUTPATIENT
Start: 2021-12-01 | End: 2021-12-01 | Stop reason: HOSPADM

## 2021-12-01 RX ORDER — ONDANSETRON 4 MG/1
4 TABLET, ORALLY DISINTEGRATING ORAL EVERY 6 HOURS PRN
Status: DISCONTINUED | OUTPATIENT
Start: 2021-12-01 | End: 2021-12-02

## 2021-12-01 RX ORDER — MORPHINE SULFATE 10 MG/ML
6 INJECTION, SOLUTION INTRAMUSCULAR; INTRAVENOUS EVERY 10 MIN PRN
Status: DISCONTINUED | OUTPATIENT
Start: 2021-12-01 | End: 2021-12-01 | Stop reason: HOSPADM

## 2021-12-01 RX ORDER — DOCUSATE SODIUM 100 MG/1
100 CAPSULE, LIQUID FILLED ORAL 2 TIMES DAILY
Status: DISCONTINUED | OUTPATIENT
Start: 2021-12-02 | End: 2021-12-02

## 2021-12-01 RX ORDER — SODIUM CHLORIDE, SODIUM LACTATE, POTASSIUM CHLORIDE, CALCIUM CHLORIDE 600; 310; 30; 20 MG/100ML; MG/100ML; MG/100ML; MG/100ML
INJECTION, SOLUTION INTRAVENOUS CONTINUOUS
Status: DISCONTINUED | OUTPATIENT
Start: 2021-12-01 | End: 2021-12-01 | Stop reason: HOSPADM

## 2021-12-01 RX ORDER — DEXAMETHASONE SODIUM PHOSPHATE 4 MG/ML
VIAL (ML) INJECTION AS NEEDED
Status: DISCONTINUED | OUTPATIENT
Start: 2021-12-01 | End: 2021-12-01 | Stop reason: SURG

## 2021-12-01 RX ORDER — HYDROMORPHONE HYDROCHLORIDE 1 MG/ML
0.4 INJECTION, SOLUTION INTRAMUSCULAR; INTRAVENOUS; SUBCUTANEOUS EVERY 5 MIN PRN
Status: DISCONTINUED | OUTPATIENT
Start: 2021-12-01 | End: 2021-12-01 | Stop reason: HOSPADM

## 2021-12-01 RX ORDER — ONDANSETRON 2 MG/ML
4 INJECTION INTRAMUSCULAR; INTRAVENOUS EVERY 6 HOURS PRN
Status: DISCONTINUED | OUTPATIENT
Start: 2021-12-01 | End: 2021-12-02

## 2021-12-01 RX ORDER — HYDROCODONE BITARTRATE AND ACETAMINOPHEN 5; 325 MG/1; MG/1
1 TABLET ORAL EVERY 4 HOURS PRN
Status: DISCONTINUED | OUTPATIENT
Start: 2021-12-01 | End: 2021-12-02

## 2021-12-01 RX ORDER — SODIUM CHLORIDE, SODIUM LACTATE, POTASSIUM CHLORIDE, CALCIUM CHLORIDE 600; 310; 30; 20 MG/100ML; MG/100ML; MG/100ML; MG/100ML
INJECTION, SOLUTION INTRAVENOUS CONTINUOUS
Status: DISCONTINUED | OUTPATIENT
Start: 2021-12-01 | End: 2021-12-02

## 2021-12-01 RX ORDER — LIDOCAINE HYDROCHLORIDE 10 MG/ML
INJECTION, SOLUTION EPIDURAL; INFILTRATION; INTRACAUDAL; PERINEURAL AS NEEDED
Status: DISCONTINUED | OUTPATIENT
Start: 2021-12-01 | End: 2021-12-01 | Stop reason: SURG

## 2021-12-01 RX ORDER — NALOXONE HYDROCHLORIDE 0.4 MG/ML
80 INJECTION, SOLUTION INTRAMUSCULAR; INTRAVENOUS; SUBCUTANEOUS AS NEEDED
Status: DISCONTINUED | OUTPATIENT
Start: 2021-12-01 | End: 2021-12-01 | Stop reason: HOSPADM

## 2021-12-01 RX ORDER — ONDANSETRON 4 MG/1
4 TABLET, FILM COATED ORAL EVERY 8 HOURS PRN
Qty: 30 TABLET | Refills: 0 | Status: SHIPPED | OUTPATIENT
Start: 2021-12-01 | End: 2022-01-14 | Stop reason: ALTCHOICE

## 2021-12-01 RX ORDER — MORPHINE SULFATE 4 MG/ML
4 INJECTION, SOLUTION INTRAMUSCULAR; INTRAVENOUS EVERY 10 MIN PRN
Status: DISCONTINUED | OUTPATIENT
Start: 2021-12-01 | End: 2021-12-01 | Stop reason: HOSPADM

## 2021-12-01 RX ORDER — ACETAMINOPHEN 500 MG
500 TABLET ORAL EVERY 6 HOURS PRN
Status: DISCONTINUED | OUTPATIENT
Start: 2021-12-01 | End: 2021-12-02

## 2021-12-01 RX ORDER — ONDANSETRON 2 MG/ML
4 INJECTION INTRAMUSCULAR; INTRAVENOUS ONCE AS NEEDED
Status: COMPLETED | OUTPATIENT
Start: 2021-12-01 | End: 2021-12-01

## 2021-12-01 RX ORDER — MIDAZOLAM HYDROCHLORIDE 1 MG/ML
INJECTION INTRAMUSCULAR; INTRAVENOUS AS NEEDED
Status: DISCONTINUED | OUTPATIENT
Start: 2021-12-01 | End: 2021-12-01 | Stop reason: SURG

## 2021-12-01 RX ORDER — ENOXAPARIN SODIUM 100 MG/ML
40 INJECTION SUBCUTANEOUS DAILY
Status: DISCONTINUED | OUTPATIENT
Start: 2021-12-02 | End: 2021-12-02

## 2021-12-01 RX ORDER — HYDROMORPHONE HYDROCHLORIDE 1 MG/ML
0.6 INJECTION, SOLUTION INTRAMUSCULAR; INTRAVENOUS; SUBCUTANEOUS EVERY 5 MIN PRN
Status: DISCONTINUED | OUTPATIENT
Start: 2021-12-01 | End: 2021-12-01 | Stop reason: HOSPADM

## 2021-12-01 RX ORDER — METOCLOPRAMIDE HYDROCHLORIDE 5 MG/ML
10 INJECTION INTRAMUSCULAR; INTRAVENOUS EVERY 6 HOURS PRN
Status: DISCONTINUED | OUTPATIENT
Start: 2021-12-01 | End: 2021-12-02

## 2021-12-01 RX ORDER — ACETAMINOPHEN 650 MG
TABLET, EXTENDED RELEASE ORAL AS NEEDED
Status: DISCONTINUED | OUTPATIENT
Start: 2021-12-01 | End: 2021-12-01 | Stop reason: HOSPADM

## 2021-12-01 RX ORDER — MORPHINE SULFATE 4 MG/ML
4 INJECTION, SOLUTION INTRAMUSCULAR; INTRAVENOUS EVERY 2 HOUR PRN
Status: DISCONTINUED | OUTPATIENT
Start: 2021-12-01 | End: 2021-12-02

## 2021-12-01 RX ORDER — CLINDAMYCIN PHOSPHATE 600 MG/50ML
600 INJECTION INTRAVENOUS EVERY 8 HOURS
Status: DISCONTINUED | OUTPATIENT
Start: 2021-12-01 | End: 2021-12-02

## 2021-12-01 RX ORDER — ACETAMINOPHEN 500 MG
1000 TABLET ORAL ONCE
Status: COMPLETED | OUTPATIENT
Start: 2021-12-01 | End: 2021-12-01

## 2021-12-01 RX ORDER — SCOLOPAMINE TRANSDERMAL SYSTEM 1 MG/1
1 PATCH, EXTENDED RELEASE TRANSDERMAL ONCE
Status: DISCONTINUED | OUTPATIENT
Start: 2021-12-01 | End: 2021-12-02

## 2021-12-01 RX ORDER — ONDANSETRON 2 MG/ML
INJECTION INTRAMUSCULAR; INTRAVENOUS AS NEEDED
Status: DISCONTINUED | OUTPATIENT
Start: 2021-12-01 | End: 2021-12-01 | Stop reason: SURG

## 2021-12-01 RX ORDER — METOCLOPRAMIDE 10 MG/1
10 TABLET ORAL EVERY 6 HOURS PRN
Status: DISCONTINUED | OUTPATIENT
Start: 2021-12-01 | End: 2021-12-02

## 2021-12-01 RX ORDER — CLINDAMYCIN HYDROCHLORIDE 300 MG/1
300 CAPSULE ORAL 3 TIMES DAILY
Qty: 30 CAPSULE | Refills: 2 | Status: SHIPPED | OUTPATIENT
Start: 2021-12-01 | End: 2021-12-11

## 2021-12-01 RX ORDER — ROPIVACAINE HYDROCHLORIDE 5 MG/ML
INJECTION, SOLUTION EPIDURAL; INFILTRATION; PERINEURAL AS NEEDED
Status: DISCONTINUED | OUTPATIENT
Start: 2021-12-01 | End: 2021-12-01 | Stop reason: SURG

## 2021-12-01 RX ORDER — MORPHINE SULFATE 2 MG/ML
2 INJECTION, SOLUTION INTRAMUSCULAR; INTRAVENOUS EVERY 2 HOUR PRN
Status: DISCONTINUED | OUTPATIENT
Start: 2021-12-01 | End: 2021-12-02

## 2021-12-01 RX ORDER — HYDROCODONE BITARTRATE AND ACETAMINOPHEN 5; 325 MG/1; MG/1
2 TABLET ORAL AS NEEDED
Status: DISCONTINUED | OUTPATIENT
Start: 2021-12-01 | End: 2021-12-01 | Stop reason: HOSPADM

## 2021-12-01 RX ORDER — CLINDAMYCIN PHOSPHATE 150 MG/ML
INJECTION, SOLUTION INTRAVENOUS AS NEEDED
Status: DISCONTINUED | OUTPATIENT
Start: 2021-12-01 | End: 2021-12-01 | Stop reason: SURG

## 2021-12-01 RX ORDER — HYDROCODONE BITARTRATE AND ACETAMINOPHEN 5; 325 MG/1; MG/1
1 TABLET ORAL AS NEEDED
Status: DISCONTINUED | OUTPATIENT
Start: 2021-12-01 | End: 2021-12-01 | Stop reason: HOSPADM

## 2021-12-01 RX ORDER — DIPHENHYDRAMINE HYDROCHLORIDE 50 MG/ML
12.5 INJECTION INTRAMUSCULAR; INTRAVENOUS EVERY 4 HOURS PRN
Status: DISCONTINUED | OUTPATIENT
Start: 2021-12-01 | End: 2021-12-02

## 2021-12-01 RX ORDER — PROCHLORPERAZINE EDISYLATE 5 MG/ML
5 INJECTION INTRAMUSCULAR; INTRAVENOUS ONCE AS NEEDED
Status: DISCONTINUED | OUTPATIENT
Start: 2021-12-01 | End: 2021-12-01 | Stop reason: HOSPADM

## 2021-12-01 RX ORDER — DIPHENHYDRAMINE HCL 25 MG
25 CAPSULE ORAL EVERY 4 HOURS PRN
Status: DISCONTINUED | OUTPATIENT
Start: 2021-12-01 | End: 2021-12-02

## 2021-12-01 RX ORDER — HYDROCODONE BITARTRATE AND ACETAMINOPHEN 5; 325 MG/1; MG/1
1-2 TABLET ORAL EVERY 4 HOURS PRN
Qty: 40 TABLET | Refills: 0 | Status: SHIPPED | OUTPATIENT
Start: 2021-12-01 | End: 2022-01-14 | Stop reason: ALTCHOICE

## 2021-12-01 RX ORDER — HYDROCODONE BITARTRATE AND ACETAMINOPHEN 5; 325 MG/1; MG/1
2 TABLET ORAL EVERY 4 HOURS PRN
Status: DISCONTINUED | OUTPATIENT
Start: 2021-12-01 | End: 2021-12-02

## 2021-12-01 RX ORDER — HALOPERIDOL 5 MG/ML
0.25 INJECTION INTRAMUSCULAR ONCE AS NEEDED
Status: DISCONTINUED | OUTPATIENT
Start: 2021-12-01 | End: 2021-12-01 | Stop reason: HOSPADM

## 2021-12-01 RX ORDER — NEOSTIGMINE METHYLSULFATE 1 MG/ML
INJECTION INTRAVENOUS AS NEEDED
Status: DISCONTINUED | OUTPATIENT
Start: 2021-12-01 | End: 2021-12-01 | Stop reason: SURG

## 2021-12-01 RX ORDER — MORPHINE SULFATE 4 MG/ML
2 INJECTION, SOLUTION INTRAMUSCULAR; INTRAVENOUS EVERY 10 MIN PRN
Status: DISCONTINUED | OUTPATIENT
Start: 2021-12-01 | End: 2021-12-01 | Stop reason: HOSPADM

## 2021-12-01 RX ORDER — CLINDAMYCIN PHOSPHATE 900 MG/50ML
900 INJECTION INTRAVENOUS ONCE
Status: DISCONTINUED | OUTPATIENT
Start: 2021-12-01 | End: 2021-12-01 | Stop reason: HOSPADM

## 2021-12-01 RX ORDER — HYDROMORPHONE HYDROCHLORIDE 1 MG/ML
0.2 INJECTION, SOLUTION INTRAMUSCULAR; INTRAVENOUS; SUBCUTANEOUS EVERY 5 MIN PRN
Status: DISCONTINUED | OUTPATIENT
Start: 2021-12-01 | End: 2021-12-01 | Stop reason: HOSPADM

## 2021-12-01 RX ORDER — GLYCOPYRROLATE 0.2 MG/ML
INJECTION, SOLUTION INTRAMUSCULAR; INTRAVENOUS AS NEEDED
Status: DISCONTINUED | OUTPATIENT
Start: 2021-12-01 | End: 2021-12-01 | Stop reason: SURG

## 2021-12-01 RX ADMIN — INDOCYANINE GREEN AND WATER 7.5 MG: 25 MG KIT INJECTION at 17:32:00

## 2021-12-01 RX ADMIN — ROPIVACAINE HYDROCHLORIDE 50 ML: 5 INJECTION, SOLUTION EPIDURAL; INFILTRATION; PERINEURAL at 15:49:00

## 2021-12-01 RX ADMIN — SODIUM CHLORIDE, SODIUM LACTATE, POTASSIUM CHLORIDE, CALCIUM CHLORIDE: 600; 310; 30; 20 INJECTION, SOLUTION INTRAVENOUS at 18:45:00

## 2021-12-01 RX ADMIN — DEXAMETHASONE SODIUM PHOSPHATE 8 MG: 4 MG/ML VIAL (ML) INJECTION at 15:27:00

## 2021-12-01 RX ADMIN — ROCURONIUM BROMIDE 40 MG: 10 INJECTION, SOLUTION INTRAVENOUS at 15:27:00

## 2021-12-01 RX ADMIN — LIDOCAINE HYDROCHLORIDE 50 MG: 10 INJECTION, SOLUTION EPIDURAL; INFILTRATION; INTRACAUDAL; PERINEURAL at 15:27:00

## 2021-12-01 RX ADMIN — CLINDAMYCIN PHOSPHATE 900 MG: 150 INJECTION, SOLUTION INTRAVENOUS at 15:51:00

## 2021-12-01 RX ADMIN — DIPHENHYDRAMINE HYDROCHLORIDE 12.5 MG: 50 INJECTION INTRAMUSCULAR; INTRAVENOUS at 15:27:00

## 2021-12-01 RX ADMIN — NEOSTIGMINE METHYLSULFATE 3 MG: 1 INJECTION INTRAVENOUS at 18:17:00

## 2021-12-01 RX ADMIN — MIDAZOLAM HYDROCHLORIDE 2 MG: 1 INJECTION INTRAMUSCULAR; INTRAVENOUS at 15:24:00

## 2021-12-01 RX ADMIN — GLYCOPYRROLATE 0.6 MG: 0.2 INJECTION, SOLUTION INTRAMUSCULAR; INTRAVENOUS at 18:18:00

## 2021-12-01 RX ADMIN — ONDANSETRON 4 MG: 2 INJECTION INTRAMUSCULAR; INTRAVENOUS at 17:28:00

## 2021-12-01 NOTE — ANESTHESIA PROCEDURE NOTES
Airway  Date/Time: 12/1/2021 3:29 PM  Urgency: Elective    Airway not difficult    General Information and Staff    Patient location during procedure: OR  Anesthesiologist: Bassam Broussard DO  Resident/CRNA: Aida Liang CRNA  Performed: CRNA     Pily Covert

## 2021-12-01 NOTE — PROGRESS NOTES
Plan for bilateral NS mastectomy by Dr. Ronn Lanier and immediate reconstruction with tissue expander vs.silicone implant and acellular dermal matrix reviewed with patient.  The risks of surgery including but not limited to bleeding, infection, scarring, delayed

## 2021-12-01 NOTE — ANESTHESIA PREPROCEDURE EVALUATION
Anesthesia PreOp Note    HPI:     Marie Crawley is a 46year old female who presents for preoperative consultation requested by: Joseph Benson MD    Date of Surgery: 12/1/2021    Procedure(s):  bilateral nipple sparing mastectomy, left lymphoscint , Rfl:   Ascorbic Acid (VITAMIN C) 1000 MG Oral Tab, Take 1,000 mg by mouth daily. , Disp: , Rfl:   Krill Oil 300 MG Oral Cap, Take 1 tablet by mouth daily.    (Patient not taking: Reported on 11/3/2021), Disp: , Rfl:   Magnesium 100 MG Oral Cap, Take 1 tabl Asked        Blood Transfusions: Not Asked        Caffeine Concern: Yes          coffee        Occupational Exposure: Not Asked        Hobby Hazards: Not Asked        Sleep Concern: Not Asked        Stress Concern: Not Asked        Weight Concern: Not Aske exam   Cardiovascular - negative ROS and normal exam    Neuro/Psych - negative ROS     GI/Hepatic/Renal    (+) GERD,     Endo/Other - negative ROS     Comments: L breast CA  Abdominal              Anesthesia Plan:   ASA:  2  Plan:   General  Airway:  ETT

## 2021-12-01 NOTE — ANESTHESIA PROCEDURE NOTES
Peripheral Block  Performed by: Nathaly Maher DO  Authorized by: Nathaly Maher DO       General Information and Staff    Start Time:  12/1/2021 3:40 PM  End Time:  12/1/2021 3:49 PM  Anesthesiologist:  Nathaly Maher DO  Patient Location:  OR      Site I

## 2021-12-01 NOTE — H&P
History of Present Illness:   Ms. Tracy Arnold is a 46year old woman who presents with imaging detected breast cancer.   Patient presented for routine screening mammogram on 9/20/2021, patient has breast density category C.  Right breast did not have recommendations for further therapy.              Past Medical History:   Diagnosis Date   • Diverticulosis     • Gastritis     • Microscopic hematuria 2010   • Ovarian cyst 2000   • PONV (postoperative nausea and vomiting)                 Past Surgical His emphysema, chronic bronchitis, shortness of breath or abnormal sound when breathing.      Cardiovascular:   There is no history of chest pain, chest pressure/discomfort, palpitations, irregular heartbeat, fainting or near-fainting, difficulty breathing when Right arm, Patient Position: Sitting, Cuff Size: adult)   Pulse 73   Temp 98.3 °F (36.8 °C) (Oral)   Resp 16   Ht 1.626 m (5' 4\")   Wt 60.8 kg (134 lb)   LMP 10/22/2021   SpO2 98%   BMI 23.00 kg/m²      Physical Exam:  The patient is an alert, oriented, w detected left breast cancer, clinical stage T2 NX MX.     Discussion and Plan:  I had a discussion with the Patient regarding her breast exam. On exam today I found her to be healing well since recent biopsy with no other clinical findings.   Personally rev given ample opportunity for questions and those questions were answered to her satisfaction. She has been  encouraged to contact the office with any questions or concerns prior to her next appointment.      Pre-op Diagnosis: Malignant neoplasm of upper-oute

## 2021-12-01 NOTE — BRIEF OP NOTE
Pre-Operative Diagnosis: Malignant neoplasm of upper-outer quadrant of left breast in female, estrogen receptor [facility] [C50.412, Z17.0]     Post-Operative Diagnosis: Malignant neoplasm of upper-outer quadrant of left breast in female, estrogen rece

## 2021-12-02 ENCOUNTER — TELEPHONE (OUTPATIENT)
Dept: SURGERY | Facility: CLINIC | Age: 51
End: 2021-12-02

## 2021-12-02 VITALS
OXYGEN SATURATION: 96 % | BODY MASS INDEX: 21.68 KG/M2 | DIASTOLIC BLOOD PRESSURE: 63 MMHG | HEART RATE: 65 BPM | SYSTOLIC BLOOD PRESSURE: 90 MMHG | HEIGHT: 64 IN | TEMPERATURE: 98 F | WEIGHT: 127 LBS | RESPIRATION RATE: 18 BRPM

## 2021-12-02 PROCEDURE — 99214 OFFICE O/P EST MOD 30 MIN: CPT | Performed by: HOSPITALIST

## 2021-12-02 NOTE — CM/SW NOTE
12/02/21 0900   Discharge disposition   Expected discharge disposition Home or Self   Home services after discharge None   Discharge transportation Private car     Pt discussed during nursing rounds. Pt is stable for dc today. MD dc order entered.   No h

## 2021-12-02 NOTE — DISCHARGE SUMMARY
[facility]    Discharge Summary    Jackeline Munson Patient Status:  Outpatient in a Bed    1970 MRN [id number]   Location [facility] Attending Anu Fraser MD   Hosp Day # 0 PCP Chares Kehr, MD     Date of Admissi of architectural distortion and was highly suspicious for malignancy for which stereotactic biopsy was recommended. Munir Montez is also loosely grouped calcifications some of which appear to demonstrate linear morphology in the anterior left breast which are phillip sparing mastectomy, left lymphoscintigraphy, left sentinel lymph node biopsy,  (DrGresik), Immediate breast reconstruction with placement of bilateral breast tissue expanders and acellular dermal matrix, (Dr Bird Lobe    Complications: n/a    Discharge Cond Follow up Visits:  Follow-up with pcp per routine    Follow up Labs: n/a     Other Discharge Instructions: f/u with Breast surgery as instructed    Kyrie Curiel MD  12/2/2021  8:47 AM    > 35 min

## 2021-12-02 NOTE — BRIEF OP NOTE
Pre-Operative Diagnosis: Malignant neoplasm of upper-outer quadrant of left breast in female, estrogen receptor positive ([email or web address] [C50.412, Z17.0]     Post-Operative Diagnosis: Malignant neoplasm of upper-outer quadrant of left breast in female, estrogen rece

## 2021-12-02 NOTE — OPERATIVE REPORT
[facility]    PATIENT'S NAME: REHAN Bocanegra   ATTENDING PHYSICIAN: Ramana Valencia MD   OPERATING PHYSICIAN: Dimitrios Rowe MD   PATIENT ACCOUNT#:   [id number]    LOCATION:   ROOM 7 A [facility]  MEDICAL RECORD #:   [id number]       DATE OF BIRTH Inframammary fold incisions were marked in conjunction with Dr. Ravi Feng. The patient was taken to the operating room by Dr. Kelli Anderson team where she underwent bilateral nipple-sparing mastectomy.   Once Dr. Kelli Anderson portion of procedure was completed, YVON contreras air was aspirated. The wounds were repaired in layered fashion with interrupted 3-0 Vicryl deep dermal suture and running 4-0 Monocryl subcuticular suture. Biopatches and Tegaderms were placed on the drain sites.   Exofin and Steri-Strips were placed on t

## 2021-12-02 NOTE — PROGRESS NOTES
[facility] - [place]    Progress Note    Marybeth Brannon Patient Status:  Outpatient in a Bed    1970 MRN [id number]   Location [address] Attending Sherill Peabody, MD   Hosp Day # 0 PCP Albertina Dave HGB 14.7 11/03/2021    HCT 44.2 11/03/2021    .0 11/03/2021    CREATSERUM 0.73 11/03/2021    BUN 14 11/03/2021     11/03/2021    K 3.8 11/03/2021     11/03/2021    CO2 27.0 11/03/2021    GLU 85 11/03/2021    CA 9.3 11/03/2021    ALB 4.0

## 2021-12-02 NOTE — OPERATIVE REPORT
[facility]    PATIENT'S NAME: [email or web address], REHAN FITZGERALD   ATTENDING PHYSICIAN: Mary Jo Garcia MD   OPERATING PHYSICIAN: Kuldeep Evans.  Keeley Green MD   PATIENT ACCOUNT#:   [id number]    LOCATION:   ROOM 7 A [facility]  MEDICAL RECORD #:   [id number]       DATE OF B perfusion to accommodate the immediate reconstruction.   Risks and possible complications were discussed with the patient including, but not limited to, infection, bleeding, injury to surrounding structures, and possible need for re-operation, and she agree incised with a 15-blade knife for the skin. Electrocautery and hemostasis were used to define mastectomy flaps raised to the borders of the clavicle, sternum, inframammary fold, and latissimus tendon laterally.   The left breast was carefully dissected off

## 2021-12-02 NOTE — PROGRESS NOTES
PLASTICS    This is a 46year old female that is POD#1 s/p bilateral mastectomy, left SLNB, and immediate recon with tissue expanders, pre-pec, Alloderm ADM, intra-op air fill by Dr. Lamont Matthews. Her nurse had some concerns of right upper pole swelling.  Alberto Avina

## 2021-12-02 NOTE — ANESTHESIA POSTPROCEDURE EVALUATION
Patient: Tressa Cortez    Procedure Summary     Date: 12/01/21 Room / Location: [address] [place] [address] MAIN OR 01 / [address] [facility] OR    Anesthesia Start: 4777 Anesthesia Stop:     Procedures:       bilateral nipple sparing mastectomy, left lymphoscintigraphy, left sentinel ly

## 2021-12-02 NOTE — TELEPHONE ENCOUNTER
Spoke to floor nurse, Nikki, for this patient. Patient is experiencing bilateral breast swelling and erythema. Patient is hypotensive, but asymptomatic. Nikki was instructed to page Dr. Kamryn Solis.

## 2021-12-08 ENCOUNTER — OFFICE VISIT (OUTPATIENT)
Dept: SURGERY | Facility: CLINIC | Age: 51
End: 2021-12-08
Payer: COMMERCIAL

## 2021-12-08 VITALS
TEMPERATURE: 98 F | SYSTOLIC BLOOD PRESSURE: 109 MMHG | HEIGHT: 64 IN | RESPIRATION RATE: 16 BRPM | WEIGHT: 130.81 LBS | OXYGEN SATURATION: 99 % | BODY MASS INDEX: 22.33 KG/M2 | HEART RATE: 88 BPM | DIASTOLIC BLOOD PRESSURE: 83 MMHG

## 2021-12-08 DIAGNOSIS — Z17.0 MALIGNANT NEOPLASM OF UPPER-OUTER QUADRANT OF LEFT BREAST IN FEMALE, ESTROGEN RECEPTOR POSITIVE (HCC): Primary | ICD-10-CM

## 2021-12-08 DIAGNOSIS — C50.412 MALIGNANT NEOPLASM OF UPPER-OUTER QUADRANT OF LEFT BREAST IN FEMALE, ESTROGEN RECEPTOR POSITIVE (HCC): Primary | ICD-10-CM

## 2021-12-08 PROCEDURE — 99024 POSTOP FOLLOW-UP VISIT: CPT | Performed by: SURGERY

## 2021-12-08 PROCEDURE — 3074F SYST BP LT 130 MM HG: CPT | Performed by: SURGERY

## 2021-12-08 PROCEDURE — 3008F BODY MASS INDEX DOCD: CPT | Performed by: SURGERY

## 2021-12-08 PROCEDURE — 3079F DIAST BP 80-89 MM HG: CPT | Performed by: SURGERY

## 2021-12-09 NOTE — PROGRESS NOTES
Breast Surgery Post-Operative Visit    Diagnosis: Left breast cancer status post bilateral nipple sparing mastectomies with left sentinel lymph node biopsy and tissue expander reconstruction on December 1, 2021.     Stage: Cancer Staging  Malignant neoplasm fibroadenoma. The infiltrating ductal carcinoma was ER 95%, progesterone 99%, HER-2/austin was 2+ by IHC, however negative by FISH and Ki-67 of 25%.   Given the multifocal nature of the invasive cancer on biopsy, as well as the DCIS she underwent bilateral br Problem Relation Age of Onset   • Breast Cancer Mother 76   • Neurological Disorder Mother         Cerebral Aneurysm   • Dementia Mother    • Heart Disease Father         CAD   • Prostate Cancer Father 80   • Diabetes Brother    • Schizophrenia Brother urination, needing to get up at night to urinate, urinary hesitancy or retaining urine, painful urination, urinary incontinence, decreased urine stream, blood in the urine or vaginal/penile discharge.     Skin:    The patient denies rash, itching, skin lesi auscultation. Heart: The rhythm is regular. There are no murmurs, rubs, gallops or thrills. Breasts:  Her breasts are surgically absent. Nipple areolar complexes are well perfused. There is no evidence of any skin breakdown or necrosis.   Surgical warranted in the future if she identifies any limitations or restrictions. She was encouraged to contact the office with any questions or concerns prior to her next scheduled appointment.

## 2021-12-10 ENCOUNTER — OFFICE VISIT (OUTPATIENT)
Dept: SURGERY | Facility: CLINIC | Age: 51
End: 2021-12-10
Payer: COMMERCIAL

## 2021-12-10 ENCOUNTER — NURSE NAVIGATOR ENCOUNTER (OUTPATIENT)
Dept: HEMATOLOGY/ONCOLOGY | Facility: HOSPITAL | Age: 51
End: 2021-12-10

## 2021-12-10 DIAGNOSIS — Z90.13 ABSENCE OF BREAST, BILATERAL: Primary | ICD-10-CM

## 2021-12-10 PROCEDURE — 99024 POSTOP FOLLOW-UP VISIT: CPT | Performed by: PHYSICIAN ASSISTANT

## 2021-12-10 NOTE — PROGRESS NOTES
Reina Howard is a 46year old female who presents today for a follow-up s/p bilateral mastectomy, left SLNB, and immediate recon with tissue expanders, pre-pec, Alloderm ADM, intra-op air fill by Dr. Sanjay Cam on 12/1/2021. She denies fever and chills.  Artis Bravo

## 2021-12-14 ENCOUNTER — OFFICE VISIT (OUTPATIENT)
Dept: SURGERY | Facility: CLINIC | Age: 51
End: 2021-12-14
Payer: COMMERCIAL

## 2021-12-14 DIAGNOSIS — Z90.13 ABSENCE OF BREAST, BILATERAL: Primary | ICD-10-CM

## 2021-12-14 PROCEDURE — 99024 POSTOP FOLLOW-UP VISIT: CPT | Performed by: SURGERY

## 2021-12-14 NOTE — PROGRESS NOTES
Reina Howard is a 46year old female who presents today for a follow-up. She denies fever and chills. She denies nausea, vomiting, diarrhea or constipation. Physical Examination:  Breasts: Bilateral nipple areolar complexes are well-healed.   Fabiano Levy

## 2021-12-17 ENCOUNTER — OFFICE VISIT (OUTPATIENT)
Dept: SURGERY | Facility: CLINIC | Age: 51
End: 2021-12-17
Payer: COMMERCIAL

## 2021-12-17 DIAGNOSIS — Z90.13 ABSENCE OF BREAST, BILATERAL: Primary | ICD-10-CM

## 2021-12-17 PROCEDURE — 88305 TISSUE EXAM BY PATHOLOGIST: CPT | Performed by: SURGERY

## 2021-12-17 PROCEDURE — 99024 POSTOP FOLLOW-UP VISIT: CPT | Performed by: SURGERY

## 2021-12-17 NOTE — PROCEDURES
Bilateral areas of necrosis were encompassed in elliptical excisions. The areas were then infiltrated with 6 cc of 1% lidocaine with epinephrine. Breasts were prepped and draped sterilely.   The areas of necrosis were excised and the wound repaired in lay

## 2021-12-28 ENCOUNTER — OFFICE VISIT (OUTPATIENT)
Dept: SURGERY | Facility: CLINIC | Age: 51
End: 2021-12-28
Payer: COMMERCIAL

## 2021-12-28 DIAGNOSIS — Z90.13 ABSENCE OF BREAST, BILATERAL: Primary | ICD-10-CM

## 2021-12-28 PROCEDURE — 99024 POSTOP FOLLOW-UP VISIT: CPT | Performed by: SURGERY

## 2021-12-28 NOTE — PROGRESS NOTES
Rhiannon Jernigan is a 46year old female who presents today for a follow-up. She denies fever and chills. She denies nausea, vomiting, diarrhea or constipation. Physical Examination:  Breasts: Bilateral breast incisions are clean dry and intact.   Wisam Gifford

## 2021-12-29 ENCOUNTER — OFFICE VISIT (OUTPATIENT)
Dept: PHYSICAL THERAPY | Facility: HOSPITAL | Age: 51
End: 2021-12-29
Attending: SURGERY
Payer: COMMERCIAL

## 2021-12-29 NOTE — PROGRESS NOTES
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

## 2021-12-30 ENCOUNTER — OFFICE VISIT (OUTPATIENT)
Dept: HEMATOLOGY/ONCOLOGY | Facility: HOSPITAL | Age: 51
End: 2021-12-30
Attending: INTERNAL MEDICINE
Payer: COMMERCIAL

## 2021-12-30 VITALS
TEMPERATURE: 98 F | SYSTOLIC BLOOD PRESSURE: 103 MMHG | BODY MASS INDEX: 21.85 KG/M2 | WEIGHT: 128 LBS | HEART RATE: 69 BPM | OXYGEN SATURATION: 98 % | RESPIRATION RATE: 16 BRPM | HEIGHT: 64 IN | DIASTOLIC BLOOD PRESSURE: 67 MMHG

## 2021-12-30 DIAGNOSIS — C50.412 MALIGNANT NEOPLASM OF UPPER-OUTER QUADRANT OF LEFT BREAST IN FEMALE, ESTROGEN RECEPTOR POSITIVE (HCC): Primary | ICD-10-CM

## 2021-12-30 DIAGNOSIS — Z17.0 MALIGNANT NEOPLASM OF UPPER-OUTER QUADRANT OF LEFT BREAST IN FEMALE, ESTROGEN RECEPTOR POSITIVE (HCC): Primary | ICD-10-CM

## 2021-12-30 PROCEDURE — 99214 OFFICE O/P EST MOD 30 MIN: CPT | Performed by: INTERNAL MEDICINE

## 2021-12-30 RX ORDER — TAMOXIFEN CITRATE 20 MG/1
20 TABLET ORAL DAILY
Qty: 30 TABLET | Refills: 6 | Status: SHIPPED | OUTPATIENT
Start: 2021-12-30

## 2021-12-30 NOTE — PROGRESS NOTES
CAMILLA Mcneal is a 46year old female with for follow up of Malignant neoplasm of upper-outer quadrant of left breast in female, estrogen receptor positive (hcc)  (primary encounter diagnosis)    Patient s/p bilateral mastectomies on 12/1/202 • Ovarian cyst 2000   • PONV (postoperative nausea and vomiting)    • Visual impairment     Readers     Past Surgical History:   Procedure Laterality Date   • COLONOSCOPY     • COLONOSCOPY     • COLONOSCOPY N/A 6/8/2018    Procedure: COLONOSCOPY;  Rosalind Flower Staging  Malignant neoplasm of upper-outer quadrant of left breast in female, estrogen receptor positive ([facility]  Staging form: Breast, AJCC 8th Edition  - Clinical stage from 10/29/2021: Stage IIA (cT2, cN0, cM0, G3, ER+, NJ+, HER2-) - Signed by Pranay Raman Hours: No results found for this or any previous visit (from the past 48 hour(s)). Imaging & Referrals:  None   No orders of the defined types were placed in this encounter.   Collected: 12/01/21 2104 Lab Status: Edited Result - FINAL Specimen: Breast are negative for invasive and in situ carcinoma. · Fibrosis and chronic inflammation, consistent with prior biopsy site. · Foreign body radiologic marker clip present in association with lesion, gross examination only.   · Radial scar and benign fibroaden 6 or 7)      Tumor Size:    Greatest dimension of largest invasive focus (Millimeters): 21 mm        Additional Dimension (Millimeters):    15 mm      Tumor Focality:    Single focus of invasive carcinoma      Ductal Carcinoma In Situ (DCIS):    Present Nuclear Positivity:    99 %      Breast Biomarker Testing Performed on Previous Biopsy:            Progesterone Receptor (PgR) Status:    Positive          Percentage of Cells with Nuclear Positivity:    99 %      Breast Biomarker Testing Performed on Prev

## 2021-12-30 NOTE — PATIENT INSTRUCTIONS
Tamoxifen Oral Tablet 20 mg  Uses  This medicine is used for the following purposes:  · cancer prevention  · cancer  Instructions  Take the medicine with 250 mL (1 cup) of water. Store at room temperature in a dry place. Do not keep in the bathroom.   Ke medication any more than instructed. Please check with your doctor before drinking alcohol while on this medicine. Ask your doctor to show you how to perform a self breast examination.  You should check your breasts once a month and report any changes to loss  · headaches  · high blood pressure  · menstruation changes (missed or fewer periods)  · muscle cramps  · muscle pain  · nausea  · skin irritation such as redness, itching, rash, or burning  · problems with sexual functions or desire  · stomach upset you have any questions. Always follow their advice. There is a more complete description of this medicine available in [place]. Scan this code on your smartphone or tablet or use the web address below. You can also ask your pharmacist for a printout.  If you

## 2022-01-04 ENCOUNTER — OFFICE VISIT (OUTPATIENT)
Dept: SURGERY | Facility: CLINIC | Age: 52
End: 2022-01-04
Payer: COMMERCIAL

## 2022-01-04 DIAGNOSIS — Z90.13 ABSENCE OF BREAST, BILATERAL: Primary | ICD-10-CM

## 2022-01-04 PROCEDURE — 99024 POSTOP FOLLOW-UP VISIT: CPT | Performed by: SURGERY

## 2022-01-04 PROCEDURE — 99024 POSTOP FOLLOW-UP VISIT: CPT | Performed by: PHYSICIAN ASSISTANT

## 2022-01-04 NOTE — PROGRESS NOTES
This is a 75-year-old female that is 5 weeks status post her bilateral mastectomy with left sentinel lymph node biopsy with by Dr. Rowena Santiago with immediate reconstruction with tissue expanders, prepectoral placement, AlloDerm ADM, intraoperative air fill by D encouraged. She will follow-up in 1 to 2 weeks for continued expansion. **Dr. Nelda Floyd was also present for the visit today. He examined, evaluated, and was involved in the development of the treatment plan.

## 2022-01-05 ENCOUNTER — TELEPHONE (OUTPATIENT)
Dept: PHYSICAL THERAPY | Facility: HOSPITAL | Age: 52
End: 2022-01-05

## 2022-01-14 ENCOUNTER — OFFICE VISIT (OUTPATIENT)
Dept: SURGERY | Facility: CLINIC | Age: 52
End: 2022-01-14
Payer: COMMERCIAL

## 2022-01-14 DIAGNOSIS — Z90.13 ABSENCE OF BREAST, BILATERAL: Primary | ICD-10-CM

## 2022-01-14 PROCEDURE — 99024 POSTOP FOLLOW-UP VISIT: CPT | Performed by: PHYSICIAN ASSISTANT

## 2022-01-14 NOTE — PROGRESS NOTES
Juana Lyle is a 46year old female who presents today for a follow-up after bilateral mastectomy with left sentinel lymph node biopsy with by Dr. Grace López with immediate reconstruction with tissue expanders, prepectoral placement, AlloDerm ADM, intrao this well. Compression was encouraged. She will follow-up in 1 to 2 weeks for continued expansion. She was instructed to call with any questions or concerns. Questions were answered. Patient understands.      Iam Madrigal  1/14/2022  9:28 AM

## 2022-01-21 ENCOUNTER — OFFICE VISIT (OUTPATIENT)
Dept: SURGERY | Facility: CLINIC | Age: 52
End: 2022-01-21
Payer: COMMERCIAL

## 2022-01-21 DIAGNOSIS — Z90.13 ABSENCE OF BREAST, BILATERAL: Primary | ICD-10-CM

## 2022-01-21 PROCEDURE — 99024 POSTOP FOLLOW-UP VISIT: CPT | Performed by: PHYSICIAN ASSISTANT

## 2022-01-21 NOTE — PROGRESS NOTES
Jenna Campuzano is a 46year old female who presents today for a follow-up after bilateral mastectomy with left sentinel lymph node biopsy with by Dr. Robina Chavez with immediate reconstruction with tissue expanders, prepectoral placement, AlloDerm ADM, intrao was encouraged. Brittney Cortes will follow-up in 1 to 2 weeks for continued expansion with me then follow up with Dr. Nancy Sierra. She was instructed to call with any questions or concerns. Questions were answered. Patient understands.      Iam Gomez  1/21/

## 2022-01-25 ENCOUNTER — OFFICE VISIT (OUTPATIENT)
Dept: HEMATOLOGY/ONCOLOGY | Facility: HOSPITAL | Age: 52
End: 2022-01-25
Attending: NURSE PRACTITIONER
Payer: COMMERCIAL

## 2022-01-25 DIAGNOSIS — Z71.9 COUNSELING, UNSPECIFIED: ICD-10-CM

## 2022-01-25 DIAGNOSIS — Z85.3 PERSONAL HISTORY OF BREAST CANCER: Primary | ICD-10-CM

## 2022-01-25 DIAGNOSIS — Z08 ENCOUNTER FOR FOLLOW-UP EXAMINATION AFTER COMPLETED TREATMENT FOR MALIGNANT NEOPLASM: ICD-10-CM

## 2022-01-25 PROCEDURE — 99215 OFFICE O/P EST HI 40 MIN: CPT | Performed by: NURSE PRACTITIONER

## 2022-01-25 NOTE — PROGRESS NOTES
I met with Toshia Phillips for a Survivorship Clinic visit to provide a survivorship care plan (SCP) and information related to post-treatment care. She has a diagnosis of Stage IA, ER+, PA+, HER2- left breast cancer.   She had a bilateral nipple-sparing mastectomy to an aromatase inhibitor. We discussed that bone density will be part of monitoring once placed on that therapy.      Reviewed Schedule of other clinic visits with Primary Care and specialists: Dr. Senait Díaz will continue to manage all general health care liam includes: nutrition and exercise classes, mind/body programs, education, support groups  -[facility] [place] pandemic, virtual programming available and includes: education, support groups, special programs, nutrition and exercise classes, m

## 2022-01-28 ENCOUNTER — OFFICE VISIT (OUTPATIENT)
Dept: SURGERY | Facility: CLINIC | Age: 52
End: 2022-01-28
Payer: COMMERCIAL

## 2022-01-28 DIAGNOSIS — Z90.13 ABSENCE OF BREAST, BILATERAL: Primary | ICD-10-CM

## 2022-01-28 PROCEDURE — 99024 POSTOP FOLLOW-UP VISIT: CPT | Performed by: PHYSICIAN ASSISTANT

## 2022-01-28 NOTE — PROGRESS NOTES
Shayy Lewis is a 46year old female who presents today for a follow-up after bilateral mastectomy with left sentinel lymph node biopsy with by Dr. Tyra Quiles with immediate reconstruction with tissue expanders, prepectoral placement, AlloDerm ADM, intrao was encouraged. Juan Dalal will follow-up next week with Dr. Rey Mcneal was instructed to call with any questions or concerns.     Questions were answered. Patient understands.      Iam Sevilla  1/28/2022  9:40 AM

## 2022-02-04 ENCOUNTER — OFFICE VISIT (OUTPATIENT)
Dept: SURGERY | Facility: CLINIC | Age: 52
End: 2022-02-04
Payer: COMMERCIAL

## 2022-02-04 DIAGNOSIS — Z90.13 ABSENCE OF BREAST, BILATERAL: Primary | ICD-10-CM

## 2022-02-04 PROCEDURE — 99024 POSTOP FOLLOW-UP VISIT: CPT | Performed by: SURGERY

## 2022-02-04 NOTE — PROGRESS NOTES
Rosalino Walton is a 46year old female who presents today for a follow-up. Physical Examination:  Breasts: Bilateral breast incisions are well-healed. There is no erythema or seroma noted. Procedure: Bilateral breast was sterilely expanded with 30 cc of saline to a total of 330 cc. No seroma fluid was encountered. Assessment and Plan:  Patient is doing well. She is contemplating whether she would like additional expansion. She will follow-up in 1 week for reevaluation.

## 2022-02-11 ENCOUNTER — OFFICE VISIT (OUTPATIENT)
Dept: SURGERY | Facility: CLINIC | Age: 52
End: 2022-02-11
Payer: COMMERCIAL

## 2022-02-11 DIAGNOSIS — Z90.13 ABSENCE OF BREAST, BILATERAL: Primary | ICD-10-CM

## 2022-02-11 PROCEDURE — 99024 POSTOP FOLLOW-UP VISIT: CPT | Performed by: SURGERY

## 2022-02-11 NOTE — PROGRESS NOTES
Stella Gabriel is a 46year old female who presents today for a follow-up. She is satisfied with her current volume and wishes to proceed with the second stage of her reconstruction. She currently has a 13 cm base diameter tissue expander filled to 330 cc. Physical Examination:  Breasts: Bilateral breast incisions are clean dry and intact. There is no erythema or seroma noted. Good shape and symmetry is appreciated. Abdomen: Moderate central abdominal flank lipodystrophy is noted. There are no palpable hernias noted. Assessment and Plan:  We discussed the plan for the second stage which will include removal of bilateral breast tissue expanders, placement of smooth, round, silicone implants, and fat grafting to bilateral reconstructed breasts. The nature of the procedure was reviewed with the patient. We discussed the risks of surgery including but not limited to bleeding, infection, scarring, delayed wound healing, asymmetry, implant infection or extrusion requiring removal, ALCL, capsular contracture, hypertrophic scarring or keloid, injury to intra-abdominal structures, contour abnormalities, cysts or calcifications requiring biopsy, and need for further surgery. We reviewed the expected postoperative course including possible need for drains, as well as need for activity limitation and compression. Multiple questions were answered the patient's satisfaction. No guarantees as to outcome were offered. The patient expresses understanding and wishes to proceed.

## 2022-02-18 ENCOUNTER — TELEPHONE (OUTPATIENT)
Dept: FAMILY MEDICINE CLINIC | Facility: CLINIC | Age: 52
End: 2022-02-18

## 2022-02-18 ENCOUNTER — TELEPHONE (OUTPATIENT)
Dept: SURGERY | Facility: CLINIC | Age: 52
End: 2022-02-18

## 2022-02-18 NOTE — TELEPHONE ENCOUNTER
Patient requesting pre-op appointment. Dr. Neptali Renee next available appointment is 3/30. Surgery is scheduled on 2/24.

## 2022-02-18 NOTE — TELEPHONE ENCOUNTER
Calling pt in regards to scheduling surgery. Informed pt that I have 02/24/2022 available at [facility] with Dr. Devon Bernard. Pt verbalized understanding and in agreement with date and location. All questions answered. Encouraged pt to call or [employer] message office with any other questions or concerns.

## 2022-02-18 NOTE — TELEPHONE ENCOUNTER
Patient states she did not have an appointment yesterday and does not want to be charged a no show fee. Patient is scheduled for Saturday February 19,2022 at 10:45am for pre-op clearance res 24 used.

## 2022-02-19 ENCOUNTER — OFFICE VISIT (OUTPATIENT)
Dept: FAMILY MEDICINE CLINIC | Facility: CLINIC | Age: 52
End: 2022-02-19
Payer: COMMERCIAL

## 2022-02-19 ENCOUNTER — LAB ENCOUNTER (OUTPATIENT)
Dept: LAB | Age: 52
End: 2022-02-19
Attending: FAMILY MEDICINE
Payer: COMMERCIAL

## 2022-02-19 VITALS
DIASTOLIC BLOOD PRESSURE: 59 MMHG | HEIGHT: 64 IN | SYSTOLIC BLOOD PRESSURE: 95 MMHG | WEIGHT: 131.38 LBS | BODY MASS INDEX: 22.43 KG/M2 | TEMPERATURE: 97 F | HEART RATE: 72 BPM

## 2022-02-19 DIAGNOSIS — Z85.3 HISTORY OF INFILTRATING DUCTAL CARCINOMA OF BREAST: ICD-10-CM

## 2022-02-19 DIAGNOSIS — Z01.818 PREOP EXAMINATION: ICD-10-CM

## 2022-02-19 DIAGNOSIS — Z01.818 PREOP EXAMINATION: Primary | ICD-10-CM

## 2022-02-19 LAB
ALBUMIN SERPL-MCNC: 4.1 G/DL (ref 3.4–5)
ALBUMIN/GLOB SERPL: 1.4 {RATIO} (ref 1–2)
ALP LIVER SERPL-CCNC: 52 U/L
ALT SERPL-CCNC: 12 U/L
ANION GAP SERPL CALC-SCNC: 6 MMOL/L (ref 0–18)
AST SERPL-CCNC: 14 U/L (ref 15–37)
BASOPHILS # BLD AUTO: 0.03 X10(3) UL (ref 0–0.2)
BASOPHILS NFR BLD AUTO: 0.4 %
BILIRUB SERPL-MCNC: 0.6 MG/DL (ref 0.1–2)
BUN BLD-MCNC: 14 MG/DL (ref 7–18)
BUN/CREAT SERPL: 19.7 (ref 10–20)
CHLORIDE SERPL-SCNC: 107 MMOL/L (ref 98–112)
CO2 SERPL-SCNC: 28 MMOL/L (ref 21–32)
CREAT BLD-MCNC: 0.71 MG/DL
DEPRECATED RDW RBC AUTO: 40 FL (ref 35.1–46.3)
EOSINOPHIL # BLD AUTO: 0.29 X10(3) UL (ref 0–0.7)
EOSINOPHIL NFR BLD AUTO: 3.4 %
ERYTHROCYTE [DISTWIDTH] IN BLOOD BY AUTOMATED COUNT: 11.9 % (ref 11–15)
FASTING STATUS PATIENT QL REPORTED: YES
GLOBULIN PLAS-MCNC: 2.9 G/DL (ref 2.8–4.4)
GLUCOSE BLD-MCNC: 98 MG/DL (ref 70–99)
HCT VFR BLD AUTO: 45.8 %
HGB BLD-MCNC: 15.4 G/DL
IMM GRANULOCYTES # BLD AUTO: 0.02 X10(3) UL (ref 0–1)
IMM GRANULOCYTES NFR BLD: 0.2 %
LYMPHOCYTES # BLD AUTO: 2.02 X10(3) UL (ref 1–4)
LYMPHOCYTES NFR BLD AUTO: 23.6 %
MCH RBC QN AUTO: 30.8 PG (ref 26–34)
MCHC RBC AUTO-ENTMCNC: 33.6 G/DL (ref 31–37)
MCV RBC AUTO: 91.6 FL
MONOCYTES # BLD AUTO: 0.56 X10(3) UL (ref 0.1–1)
MONOCYTES NFR BLD AUTO: 6.5 %
NEUTROPHILS # BLD AUTO: 5.65 X10 (3) UL (ref 1.5–7.7)
NEUTROPHILS # BLD AUTO: 5.65 X10(3) UL (ref 1.5–7.7)
NEUTROPHILS NFR BLD AUTO: 65.9 %
OSMOLALITY SERPL CALC.SUM OF ELEC: 292 MOSM/KG (ref 275–295)
PLATELET # BLD AUTO: 229 10(3)UL (ref 150–450)
POTASSIUM SERPL-SCNC: 4.3 MMOL/L (ref 3.5–5.1)
PROT SERPL-MCNC: 7 G/DL (ref 6.4–8.2)
RBC # BLD AUTO: 5 X10(6)UL
SODIUM SERPL-SCNC: 141 MMOL/L (ref 136–145)
WBC # BLD AUTO: 8.6 X10(3) UL (ref 4–11)

## 2022-02-19 PROCEDURE — 99214 OFFICE O/P EST MOD 30 MIN: CPT | Performed by: FAMILY MEDICINE

## 2022-02-19 PROCEDURE — 36415 COLL VENOUS BLD VENIPUNCTURE: CPT

## 2022-02-19 PROCEDURE — 3078F DIAST BP <80 MM HG: CPT | Performed by: FAMILY MEDICINE

## 2022-02-19 PROCEDURE — 85025 COMPLETE CBC W/AUTO DIFF WBC: CPT

## 2022-02-19 PROCEDURE — 3008F BODY MASS INDEX DOCD: CPT | Performed by: FAMILY MEDICINE

## 2022-02-19 PROCEDURE — 3074F SYST BP LT 130 MM HG: CPT | Performed by: FAMILY MEDICINE

## 2022-02-19 PROCEDURE — 80053 COMPREHEN METABOLIC PANEL: CPT

## 2022-02-19 PROCEDURE — 93010 ELECTROCARDIOGRAM REPORT: CPT | Performed by: FAMILY MEDICINE

## 2022-02-19 PROCEDURE — 93005 ELECTROCARDIOGRAM TRACING: CPT

## 2022-02-21 ENCOUNTER — LAB ENCOUNTER (OUTPATIENT)
Dept: LAB | Age: 52
End: 2022-02-21
Attending: SURGERY
Payer: COMMERCIAL

## 2022-02-21 DIAGNOSIS — Z01.818 PRE-OP TESTING: ICD-10-CM

## 2022-02-22 LAB — SARS-COV-2 RNA RESP QL NAA+PROBE: NOT DETECTED

## 2022-02-23 ENCOUNTER — TELEPHONE (OUTPATIENT)
Dept: SURGERY | Facility: CLINIC | Age: 52
End: 2022-02-23

## 2022-02-23 NOTE — TELEPHONE ENCOUNTER
Spoke to PAT, confirmed patients ABX allergy. She is going to get Clindamycin per protocol. PAT confirmed.

## 2022-02-24 ENCOUNTER — ANESTHESIA EVENT (OUTPATIENT)
Dept: SURGERY | Facility: HOSPITAL | Age: 52
End: 2022-02-24
Payer: COMMERCIAL

## 2022-02-24 ENCOUNTER — HOSPITAL ENCOUNTER (OUTPATIENT)
Facility: HOSPITAL | Age: 52
Setting detail: HOSPITAL OUTPATIENT SURGERY
Discharge: HOME | End: 2022-02-24
Attending: SURGERY | Admitting: SURGERY
Payer: COMMERCIAL

## 2022-02-24 ENCOUNTER — ANESTHESIA (OUTPATIENT)
Dept: SURGERY | Facility: HOSPITAL | Age: 52
End: 2022-02-24
Payer: COMMERCIAL

## 2022-02-24 VITALS
TEMPERATURE: 98 F | SYSTOLIC BLOOD PRESSURE: 103 MMHG | RESPIRATION RATE: 16 BRPM | DIASTOLIC BLOOD PRESSURE: 63 MMHG | BODY MASS INDEX: 22.2 KG/M2 | HEART RATE: 70 BPM | HEIGHT: 64 IN | WEIGHT: 130 LBS | OXYGEN SATURATION: 99 %

## 2022-02-24 DIAGNOSIS — Z17.0 MALIGNANT NEOPLASM OF UPPER-OUTER QUADRANT OF LEFT BREAST IN FEMALE, ESTROGEN RECEPTOR POSITIVE (HCC): ICD-10-CM

## 2022-02-24 DIAGNOSIS — C50.412 MALIGNANT NEOPLASM OF UPPER-OUTER QUADRANT OF LEFT BREAST IN FEMALE, ESTROGEN RECEPTOR POSITIVE (HCC): ICD-10-CM

## 2022-02-24 DIAGNOSIS — Z01.818 PRE-OP TESTING: Primary | ICD-10-CM

## 2022-02-24 LAB — B-HCG UR QL: NEGATIVE

## 2022-02-24 PROCEDURE — 88300 SURGICAL PATH GROSS: CPT | Performed by: SURGERY

## 2022-02-24 PROCEDURE — 0JD83ZZ EXTRACTION OF ABDOMEN SUBCUTANEOUS TISSUE AND FASCIA, PERCUTANEOUS APPROACH: ICD-10-PCS | Performed by: SURGERY

## 2022-02-24 PROCEDURE — 0HUV37Z SUPPLEMENT BILATERAL BREAST WITH AUTOLOGOUS TISSUE SUBSTITUTE, PERCUTANEOUS APPROACH: ICD-10-PCS | Performed by: SURGERY

## 2022-02-24 PROCEDURE — 0HPT0NZ REMOVAL OF TISSUE EXPANDER FROM RIGHT BREAST, OPEN APPROACH: ICD-10-PCS | Performed by: SURGERY

## 2022-02-24 PROCEDURE — 0HPU0NZ REMOVAL OF TISSUE EXPANDER FROM LEFT BREAST, OPEN APPROACH: ICD-10-PCS | Performed by: SURGERY

## 2022-02-24 PROCEDURE — 81025 URINE PREGNANCY TEST: CPT

## 2022-02-24 PROCEDURE — 88305 TISSUE EXAM BY PATHOLOGIST: CPT | Performed by: SURGERY

## 2022-02-24 PROCEDURE — S0077 INJECTION, CLINDAMYCIN PHOSP: HCPCS

## 2022-02-24 PROCEDURE — S0077 INJECTION, CLINDAMYCIN PHOSP: HCPCS | Performed by: NURSE ANESTHETIST, CERTIFIED REGISTERED

## 2022-02-24 PROCEDURE — 0HRV0JZ REPLACEMENT OF BILATERAL BREAST WITH SYNTHETIC SUBSTITUTE, OPEN APPROACH: ICD-10-PCS | Performed by: SURGERY

## 2022-02-24 DEVICE — IMPLANTABLE DEVICE: Type: IMPLANTABLE DEVICE | Site: BREAST | Status: FUNCTIONAL

## 2022-02-24 RX ORDER — MORPHINE SULFATE 4 MG/ML
4 INJECTION, SOLUTION INTRAMUSCULAR; INTRAVENOUS EVERY 10 MIN PRN
Status: DISCONTINUED | OUTPATIENT
Start: 2022-02-24 | End: 2022-02-24

## 2022-02-24 RX ORDER — HYDROMORPHONE HYDROCHLORIDE 1 MG/ML
0.2 INJECTION, SOLUTION INTRAMUSCULAR; INTRAVENOUS; SUBCUTANEOUS EVERY 5 MIN PRN
Status: DISCONTINUED | OUTPATIENT
Start: 2022-02-24 | End: 2022-02-24

## 2022-02-24 RX ORDER — ONDANSETRON 4 MG/1
4 TABLET, FILM COATED ORAL EVERY 8 HOURS PRN
Qty: 30 TABLET | Refills: 0 | Status: SHIPPED | OUTPATIENT
Start: 2022-02-24

## 2022-02-24 RX ORDER — HALOPERIDOL 5 MG/ML
0.25 INJECTION INTRAMUSCULAR ONCE AS NEEDED
Status: DISCONTINUED | OUTPATIENT
Start: 2022-02-24 | End: 2022-02-24

## 2022-02-24 RX ORDER — DOCUSATE SODIUM 100 MG/1
100 CAPSULE, LIQUID FILLED ORAL 2 TIMES DAILY
Qty: 40 CAPSULE | Refills: 0 | Status: SHIPPED | OUTPATIENT
Start: 2022-02-24

## 2022-02-24 RX ORDER — ONDANSETRON 2 MG/ML
INJECTION INTRAMUSCULAR; INTRAVENOUS AS NEEDED
Status: DISCONTINUED | OUTPATIENT
Start: 2022-02-24 | End: 2022-02-24 | Stop reason: SURG

## 2022-02-24 RX ORDER — HYDROCODONE BITARTRATE AND ACETAMINOPHEN 5; 325 MG/1; MG/1
2 TABLET ORAL AS NEEDED
Status: COMPLETED | OUTPATIENT
Start: 2022-02-24 | End: 2022-02-24

## 2022-02-24 RX ORDER — NALOXONE HYDROCHLORIDE 0.4 MG/ML
80 INJECTION, SOLUTION INTRAMUSCULAR; INTRAVENOUS; SUBCUTANEOUS AS NEEDED
Status: DISCONTINUED | OUTPATIENT
Start: 2022-02-24 | End: 2022-02-24

## 2022-02-24 RX ORDER — PROCHLORPERAZINE EDISYLATE 5 MG/ML
5 INJECTION INTRAMUSCULAR; INTRAVENOUS ONCE AS NEEDED
Status: DISCONTINUED | OUTPATIENT
Start: 2022-02-24 | End: 2022-02-24

## 2022-02-24 RX ORDER — HYDROCODONE BITARTRATE AND ACETAMINOPHEN 5; 325 MG/1; MG/1
1-2 TABLET ORAL EVERY 4 HOURS PRN
Qty: 40 TABLET | Refills: 0 | Status: SHIPPED | OUTPATIENT
Start: 2022-02-24

## 2022-02-24 RX ORDER — HYDROMORPHONE HYDROCHLORIDE 1 MG/ML
0.6 INJECTION, SOLUTION INTRAMUSCULAR; INTRAVENOUS; SUBCUTANEOUS EVERY 5 MIN PRN
Status: DISCONTINUED | OUTPATIENT
Start: 2022-02-24 | End: 2022-02-24

## 2022-02-24 RX ORDER — MIDAZOLAM HYDROCHLORIDE 1 MG/ML
INJECTION INTRAMUSCULAR; INTRAVENOUS AS NEEDED
Status: DISCONTINUED | OUTPATIENT
Start: 2022-02-24 | End: 2022-02-24 | Stop reason: SURG

## 2022-02-24 RX ORDER — SCOLOPAMINE TRANSDERMAL SYSTEM 1 MG/1
1 PATCH, EXTENDED RELEASE TRANSDERMAL ONCE
Status: DISCONTINUED | OUTPATIENT
Start: 2022-02-24 | End: 2022-02-24

## 2022-02-24 RX ORDER — LIDOCAINE HYDROCHLORIDE AND EPINEPHRINE 10; 10 MG/ML; UG/ML
INJECTION, SOLUTION INFILTRATION; PERINEURAL AS NEEDED
Status: DISCONTINUED | OUTPATIENT
Start: 2022-02-24 | End: 2022-02-24 | Stop reason: HOSPADM

## 2022-02-24 RX ORDER — SODIUM CHLORIDE, SODIUM LACTATE, POTASSIUM CHLORIDE, CALCIUM CHLORIDE 600; 310; 30; 20 MG/100ML; MG/100ML; MG/100ML; MG/100ML
INJECTION, SOLUTION INTRAVENOUS CONTINUOUS
Status: DISCONTINUED | OUTPATIENT
Start: 2022-02-24 | End: 2022-02-24

## 2022-02-24 RX ORDER — NEOSTIGMINE METHYLSULFATE 1 MG/ML
INJECTION INTRAVENOUS AS NEEDED
Status: DISCONTINUED | OUTPATIENT
Start: 2022-02-24 | End: 2022-02-24 | Stop reason: SURG

## 2022-02-24 RX ORDER — HYDROMORPHONE HYDROCHLORIDE 1 MG/ML
0.4 INJECTION, SOLUTION INTRAMUSCULAR; INTRAVENOUS; SUBCUTANEOUS EVERY 5 MIN PRN
Status: DISCONTINUED | OUTPATIENT
Start: 2022-02-24 | End: 2022-02-24

## 2022-02-24 RX ORDER — CLINDAMYCIN HYDROCHLORIDE 300 MG/1
300 CAPSULE ORAL 3 TIMES DAILY
Qty: 15 CAPSULE | Refills: 0 | Status: SHIPPED | OUTPATIENT
Start: 2022-02-24 | End: 2022-03-01

## 2022-02-24 RX ORDER — CLINDAMYCIN PHOSPHATE 150 MG/ML
INJECTION, SOLUTION INTRAVENOUS AS NEEDED
Status: DISCONTINUED | OUTPATIENT
Start: 2022-02-24 | End: 2022-02-24 | Stop reason: SURG

## 2022-02-24 RX ORDER — GLYCOPYRROLATE 0.2 MG/ML
INJECTION, SOLUTION INTRAMUSCULAR; INTRAVENOUS AS NEEDED
Status: DISCONTINUED | OUTPATIENT
Start: 2022-02-24 | End: 2022-02-24 | Stop reason: SURG

## 2022-02-24 RX ORDER — DEXAMETHASONE SODIUM PHOSPHATE 4 MG/ML
VIAL (ML) INJECTION AS NEEDED
Status: DISCONTINUED | OUTPATIENT
Start: 2022-02-24 | End: 2022-02-24 | Stop reason: SURG

## 2022-02-24 RX ORDER — EPHEDRINE SULFATE 50 MG/ML
INJECTION, SOLUTION INTRAVENOUS AS NEEDED
Status: DISCONTINUED | OUTPATIENT
Start: 2022-02-24 | End: 2022-02-24 | Stop reason: SURG

## 2022-02-24 RX ORDER — ACETAMINOPHEN 500 MG
1000 TABLET ORAL ONCE
Status: COMPLETED | OUTPATIENT
Start: 2022-02-24 | End: 2022-02-24

## 2022-02-24 RX ORDER — MORPHINE SULFATE 4 MG/ML
2 INJECTION, SOLUTION INTRAMUSCULAR; INTRAVENOUS EVERY 10 MIN PRN
Status: DISCONTINUED | OUTPATIENT
Start: 2022-02-24 | End: 2022-02-24

## 2022-02-24 RX ORDER — ROCURONIUM BROMIDE 10 MG/ML
INJECTION, SOLUTION INTRAVENOUS AS NEEDED
Status: DISCONTINUED | OUTPATIENT
Start: 2022-02-24 | End: 2022-02-24 | Stop reason: SURG

## 2022-02-24 RX ORDER — LIDOCAINE HYDROCHLORIDE 10 MG/ML
INJECTION, SOLUTION EPIDURAL; INFILTRATION; INTRACAUDAL; PERINEURAL AS NEEDED
Status: DISCONTINUED | OUTPATIENT
Start: 2022-02-24 | End: 2022-02-24 | Stop reason: SURG

## 2022-02-24 RX ORDER — PHENYLEPHRINE HCL 10 MG/ML
VIAL (ML) INJECTION AS NEEDED
Status: DISCONTINUED | OUTPATIENT
Start: 2022-02-24 | End: 2022-02-24 | Stop reason: SURG

## 2022-02-24 RX ORDER — ACETAMINOPHEN 650 MG
TABLET, EXTENDED RELEASE ORAL AS NEEDED
Status: DISCONTINUED | OUTPATIENT
Start: 2022-02-24 | End: 2022-02-24 | Stop reason: HOSPADM

## 2022-02-24 RX ORDER — CLINDAMYCIN PHOSPHATE 900 MG/50ML
900 INJECTION INTRAVENOUS ONCE
Status: DISCONTINUED | OUTPATIENT
Start: 2022-02-24 | End: 2022-02-24 | Stop reason: HOSPADM

## 2022-02-24 RX ORDER — MORPHINE SULFATE 10 MG/ML
6 INJECTION, SOLUTION INTRAMUSCULAR; INTRAVENOUS EVERY 10 MIN PRN
Status: DISCONTINUED | OUTPATIENT
Start: 2022-02-24 | End: 2022-02-24

## 2022-02-24 RX ORDER — HYDROCODONE BITARTRATE AND ACETAMINOPHEN 5; 325 MG/1; MG/1
1 TABLET ORAL AS NEEDED
Status: COMPLETED | OUTPATIENT
Start: 2022-02-24 | End: 2022-02-24

## 2022-02-24 RX ORDER — ONDANSETRON 2 MG/ML
4 INJECTION INTRAMUSCULAR; INTRAVENOUS ONCE AS NEEDED
Status: COMPLETED | OUTPATIENT
Start: 2022-02-24 | End: 2022-02-24

## 2022-02-24 RX ADMIN — PHENYLEPHRINE HCL 50 MCG: 10 MG/ML VIAL (ML) INJECTION at 13:52:00

## 2022-02-24 RX ADMIN — ROCURONIUM BROMIDE 50 MG: 10 INJECTION, SOLUTION INTRAVENOUS at 13:10:00

## 2022-02-24 RX ADMIN — EPHEDRINE SULFATE 10 MG: 50 INJECTION, SOLUTION INTRAVENOUS at 15:19:00

## 2022-02-24 RX ADMIN — CLINDAMYCIN PHOSPHATE 900 MG: 150 INJECTION, SOLUTION INTRAVENOUS at 13:17:00

## 2022-02-24 RX ADMIN — ONDANSETRON 4 MG: 2 INJECTION INTRAMUSCULAR; INTRAVENOUS at 14:59:00

## 2022-02-24 RX ADMIN — NEOSTIGMINE METHYLSULFATE 5 MG: 1 INJECTION INTRAVENOUS at 15:33:00

## 2022-02-24 RX ADMIN — MIDAZOLAM HYDROCHLORIDE 2 MG: 1 INJECTION INTRAMUSCULAR; INTRAVENOUS at 13:10:00

## 2022-02-24 RX ADMIN — PHENYLEPHRINE HCL 50 MCG: 10 MG/ML VIAL (ML) INJECTION at 14:57:00

## 2022-02-24 RX ADMIN — LIDOCAINE HYDROCHLORIDE 50 MG: 10 INJECTION, SOLUTION EPIDURAL; INFILTRATION; INTRACAUDAL; PERINEURAL at 13:10:00

## 2022-02-24 RX ADMIN — GLYCOPYRROLATE 0.6 MG: 0.2 INJECTION, SOLUTION INTRAMUSCULAR; INTRAVENOUS at 15:33:00

## 2022-02-24 RX ADMIN — DEXAMETHASONE SODIUM PHOSPHATE 8 MG: 4 MG/ML VIAL (ML) INJECTION at 13:10:00

## 2022-02-24 RX ADMIN — SODIUM CHLORIDE, SODIUM LACTATE, POTASSIUM CHLORIDE, CALCIUM CHLORIDE: 600; 310; 30; 20 INJECTION, SOLUTION INTRAVENOUS at 14:15:00

## 2022-02-24 NOTE — ANESTHESIA PROCEDURE NOTES
Airway  Date/Time: 2/24/2022 1:14 PM  Urgency: Elective    Airway not difficult    General Information and Staff    Patient location during procedure: OR  Anesthesiologist: Ana Rodriguez MD  Performed: anesthesiologist     Indications and Patient Condition  Indications for airway management: anesthesia  Sedation level: deep  Preoxygenated: yes  Patient position: sniffing  Mask difficulty assessment: 1 - vent by mask    Final Airway Details  Final airway type: endotracheal airway      Successful airway: ETT  Cuffed: yes   Successful intubation technique: direct laryngoscopy  Facilitating devices/methods: intubating stylet  Endotracheal tube insertion site: oral  Blade: Luis  Blade size: #3  ETT size (mm): 7.0    Cormack-Lehane Classification: grade I - full view of glottis  Placement verified by: chest auscultation and capnometry   Measured from: teeth  Number of attempts at approach: 1    Additional Comments  Atraumatic intubation. Lips and dentition as pre-op.

## 2022-02-24 NOTE — H&P
Dr. Gadiel Cowart 2/19/22 surgical clearance H&P reviewed. No interval changes. Informed consent obtained and she wishes to proceed as planned.

## 2022-02-24 NOTE — BRIEF OP NOTE
Pre-Operative Diagnosis: Malignant neoplasm of upper-outer quadrant of left breast in female, [facility] [C50.412, Z17.0]     Post-Operative Diagnosis: Malignant neoplasm of upper-outer quadrant of left breast in female, estrogen receptor positive ([facility] [C50.412, Z17.0]      Procedure Performed:   removal of bilateral tissue expanders with placement of permanent implants and fat grafting to bilateral breasts  Right fat transfer 40cc  Left fat transfer 35cc    Surgeon(s) and Role:     Kajal Jules MD - Primary    Assistant(s):  PA: CAROLEE Guzman     Surgical Findings: as dictated     Specimen: right and left breast scar     Estimated Blood Loss: Blood Output: 10 mL (2/24/2022  3:34 PM)    Mary Hernandez PA-C  2/24/2022  3:51 PM

## 2022-02-25 NOTE — OPERATIVE REPORT
[facility]    PATIENT'S NAME: REHAN HELTON   ATTENDING PHYSICIAN: Dimitrios Schuster MD   OPERATING PHYSICIAN: Dimitrios Schuster MD   PATIENT ACCOUNT#:   [id number]    LOCATION:  [facility]  MEDICAL RECORD #:   [id number]       YOB: 1970  ADMISSION DATE:       02/24/2022      OPERATION DATE:  02/24/2022    OPERATIVE REPORT      PREOPERATIVE DIAGNOSIS:  History of bilateral mastectomy and tissue expander reconstruction. POSTOPERATIVE DIAGNOSIS:  History of bilateral mastectomy and tissue expander reconstruction. PROCEDURE:    1. Removal of bilateral breast tissue expanders. 2.   Left breast capsulotomy. 3.   Placement of silicone gel implants, bilateral breasts. 4.   Autologous fat grafting to bilateral reconstructed breasts. ASSISTANT:  Tai Teixeira PA-C     ANESTHESIA:  General.    ESTIMATED BLOOD LOSS:  Minimal.    COMPLICATIONS:  None. INDICATIONS:  Patient is a 57-year-old female who previously underwent bilateral mastectomy and prepectoral tissue expander reconstruction. She has completed expansion and now presents for exchange of permanent implants. OPERATIVE TECHNIQUE:  Informed consent was obtained from the patient. The risks, benefits, and alternatives were reviewed with the patient preoperatively. She expressed understanding and wished to proceed. The patient was marked in the preoperative holding area in the upright position. The midline and right inframammary fold was marked. The position of the right inframammary fold was transposed to the left breast.  The patient was taken to the operating room, properly identified, and placed in a supine position. Sequential compression devices were placed on bilateral lower extremities. Intravenous antibiotic prophylaxis was administered. The patient then underwent successful induction of general anesthesia and endotracheal intubation.   The arms were placed abducted on foam-padded armboards and loosely secured with Kerlix. The chest and abdomen were prepped and draped sterilely. The proposed liposuction port sites in the lateral abdomen were infiltrated with 1% lidocaine with epinephrine. Stab incisions were then created, and 1 L of tumescent solution was infiltrated into the central abdomen and flanks. Attention was then turned to the breasts. Both inframammary fold scars were infiltrated with 1% lidocaine with epinephrine. The scars were then excised and sent for permanent pathologic analysis as right and left mastectomy scars. Superior subcutaneous flaps were then elevated sharply with a scalpel. A transverse capsulotomies were then created, and both tissue expanders were retrieved and found to be intact. Both pockets were inspected. There was no periprosthetic fluid noted. There were no visible or palpable nodules noted. Complete incorporation of the acellular dermal matrix was noted. The sizers were then placed, and then attention was turned to the abdomen. Using a 4 mm Mercedes-tip cannula, power-assisted liposuction of the central abdomen and flanks was performed. Approximately 150 mL of fat was collected using the [facility] system. With sizers in place and the patient in the upright position, fat was grafted to bilateral breasts at the premarked areas; 40 mL was grafted to the right breast and 35 mL was grafted to the left breast.  Next, sizers of different lines and projections were placed. Ultimately, it appeared that the style SSM implant gave the best size and shape in accordance with the patient's desires. The right breast was addressed first.  The pocket was irrigated with Betadine irrigation and then antibiotic irrigation until clear. Hemostasis was checked and noted to be adequate. The surgical site was isolated with Ioban, and gloves were changed. The implant was brought on the field and immediately bathed in antibiotic irrigation.   It was checked for integrity and noted to be intact. The implant was placed in the prepectoral space, taking care to maintain its proper orientation. The capsule was then closed in interrupted and running 2-0 Vicryl suture. The deep dermis was reapproximated with interrupted 3-0 Vicryl deep dermal suture and then running 4-0 Monocryl subcuticular suture. Attention was then turned to the left breast.  The patient was placed in upright position. With a sizer in place, superior displacement of the inframammary fold was noted. The capsule on the fold was then released to the premarked area. Once adequate shape had been achieved, the pocket was rinsed with Betadine irrigation and then antibiotic irrigation until clear. Hemostasis was checked and noted to be adequate. Gloves were changed, and the implant was brought on the field and immediately bathed in antibiotic irrigation. It was checked for integrity and noted to be intact. The implant was placed in the prepectoral space, taking care to maintain its proper orientation. The capsule was then closed with interrupted running 2-0 Vicryl suture. The deep dermis was reapproximated with interrupted 3-0 Vicryl dermal suture and running 4-0 Monocryl subcuticular suture. The liposuction port sites were closed with interrupted 3-0 Vicryl deep dermal suture. Exofin and Steri-Strips were placed. Fluff gauze and a surgical bra were placed on the breasts. TopiFoam and an abdominal binder were placed on the abdomen. The patient was awakened, extubated, and taken to the recovery area in stable condition. There were no intraoperative complications. All needle, sponge, and instrument counts were correct at the end of the procedure. Dictated By Kayode Albright MD  d: 02/24/2022 15:48:12  t: 02/24/2022 18:32:37  Job [id number]  [facility]/

## 2022-03-04 ENCOUNTER — OFFICE VISIT (OUTPATIENT)
Dept: SURGERY | Facility: CLINIC | Age: 52
End: 2022-03-04
Payer: COMMERCIAL

## 2022-03-04 DIAGNOSIS — Z90.13 ABSENCE OF BREAST, BILATERAL: Primary | ICD-10-CM

## 2022-03-04 PROCEDURE — 99024 POSTOP FOLLOW-UP VISIT: CPT | Performed by: PHYSICIAN ASSISTANT

## 2022-03-04 NOTE — PROGRESS NOTES
Lloyd Morales is a 46year old female who presents today for a follow-up after removal of bilateral breast tissue expanders, left breast capsulotomy, placement of silicone gel implants bilateral breasts, autologous fat grafting to bilateral reconstructed breasts with Dr. Yanna Fletcher on 2/24/2022. She denies fever and chills. She denies nausea, vomiting, diarrhea or constipation. She denies shortness of breath/calf pain. Her pain is controlled. She has been compliant with compression and activity restrictions. She completed her antibiotic and is not taking narcotic pain medication. Physical Exam     Surgical incisions are clean, dry, and intact. No erythema, no wound drainage. Breasts: bilateral breast incisions clean, dry and intact. No wound drainage or wound dehiscence. Bilateral breast skin is without erythema, skin breakdown or necrosis. There is mild resolving ecchymosis on the left. No evidence of hematoma/seroma. Good shape and symmetry. Bilateral nipples viable. Abdomen: incisions clean, dry and intact without wound drainage or dehiscence; no evidence of hematoma/seroma    There were no vitals filed for this visit. Assessment and Plan     Lloyd Morales is doing well s/p removal of bilateral breast tissue expanders, left breast capsulotomy, placement of silicone gel implants bilateral breasts, autologous fat grafting to bilateral reconstructed breasts with Dr. Yanna Fletcher on 2/24/2022. New steri-strips were placed on the incisions today. I recommend she continue with breast compression and activity restrictions. She can transition to her own compression garments such as Spanx if these are more comfortable and discontinue compression when she feels able. We reviewed reasons to contact our office. She will follow up with Dr. Yanna Fletcher in 2 weeks. Questions were answered. Patient understands.      Iam Renteria  3/4/2022  9:57 AM

## 2022-03-08 ENCOUNTER — TELEPHONE (OUTPATIENT)
Dept: SURGERY | Facility: CLINIC | Age: 52
End: 2022-03-08

## 2022-03-18 ENCOUNTER — OFFICE VISIT (OUTPATIENT)
Dept: SURGERY | Facility: CLINIC | Age: 52
End: 2022-03-18
Payer: COMMERCIAL

## 2022-03-18 DIAGNOSIS — Z90.13 ABSENCE OF BREAST, BILATERAL: Primary | ICD-10-CM

## 2022-03-18 PROCEDURE — 99024 POSTOP FOLLOW-UP VISIT: CPT | Performed by: SURGERY

## 2022-03-18 NOTE — PROGRESS NOTES
Sharifa Natarajan is a 46year old female who presents today for a follow-up. She denies fever and chills. She denies nausea, vomiting, diarrhea or constipation. Physical Examination:  Breasts: Bilateral breast incisions are well-healed. Good shape and symmetry is appreciated. There is no erythema or seroma noted. Abdominal incisions are well-healed. Assessment and Plan:  Patient is doing well. We discussed scar care including massage moisturizer and silicone products. The patient may resume regular activity. She will follow-up in 6 months for a scar check. The plan was reviewed with the patient and questions were answered.

## 2022-04-12 ENCOUNTER — TELEPHONE (OUTPATIENT)
Dept: SURGERY | Facility: CLINIC | Age: 52
End: 2022-04-12

## 2022-04-12 NOTE — TELEPHONE ENCOUNTER
Returning patient's call regarding breast MRI prior to follow up visit in June 2022. Informed patient that no new imaging is needed at this time. Patient verbalized understanding.

## 2022-05-17 ENCOUNTER — PATIENT MESSAGE (OUTPATIENT)
Dept: HEMATOLOGY/ONCOLOGY | Facility: HOSPITAL | Age: 52
End: 2022-05-17

## 2022-05-17 RX ORDER — TAMOXIFEN CITRATE 20 MG/1
20 TABLET ORAL DAILY
Qty: 30 TABLET | Refills: 6 | Status: SHIPPED | OUTPATIENT
Start: 2022-05-17

## 2022-05-17 NOTE — TELEPHONE ENCOUNTER
From: Meme Crenshaw  To: Sumeet Parker MD  Sent: 5/17/2022 6:38 AM CDT  Subject: Tamoxifen Prescription Refill    Hello,    I tried to refill my prescription for Tamoxifen 20mg tablet at the [address] [place] [address] ([phone]) and they said I had to call my Doctor for a refill. I take 1 tablet per day and only have 3 left.      Thank you,  Giorgi Morales  [phone]

## 2022-06-01 ENCOUNTER — OFFICE VISIT (OUTPATIENT)
Dept: SURGERY | Facility: CLINIC | Age: 52
End: 2022-06-01
Payer: COMMERCIAL

## 2022-06-01 VITALS
BODY MASS INDEX: 23 KG/M2 | TEMPERATURE: 98 F | OXYGEN SATURATION: 100 % | DIASTOLIC BLOOD PRESSURE: 74 MMHG | WEIGHT: 131.38 LBS | HEART RATE: 62 BPM | SYSTOLIC BLOOD PRESSURE: 106 MMHG | RESPIRATION RATE: 16 BRPM

## 2022-06-01 DIAGNOSIS — Z90.13 ABSENCE OF BREAST, BILATERAL: ICD-10-CM

## 2022-06-01 DIAGNOSIS — Z17.0 MALIGNANT NEOPLASM OF UPPER-OUTER QUADRANT OF LEFT BREAST IN FEMALE, ESTROGEN RECEPTOR POSITIVE (HCC): Primary | ICD-10-CM

## 2022-06-01 DIAGNOSIS — C50.412 MALIGNANT NEOPLASM OF UPPER-OUTER QUADRANT OF LEFT BREAST IN FEMALE, ESTROGEN RECEPTOR POSITIVE (HCC): Primary | ICD-10-CM

## 2022-06-01 PROCEDURE — 3078F DIAST BP <80 MM HG: CPT | Performed by: SURGERY

## 2022-06-01 PROCEDURE — 3074F SYST BP LT 130 MM HG: CPT | Performed by: SURGERY

## 2022-06-01 PROCEDURE — 99213 OFFICE O/P EST LOW 20 MIN: CPT | Performed by: SURGERY

## 2022-06-01 RX ORDER — BIOTIN 1 MG
1000 TABLET ORAL DAILY
COMMUNITY

## 2022-06-30 ENCOUNTER — OFFICE VISIT (OUTPATIENT)
Dept: HEMATOLOGY/ONCOLOGY | Facility: HOSPITAL | Age: 52
End: 2022-06-30
Attending: INTERNAL MEDICINE
Payer: COMMERCIAL

## 2022-06-30 VITALS
OXYGEN SATURATION: 98 % | DIASTOLIC BLOOD PRESSURE: 67 MMHG | BODY MASS INDEX: 21.96 KG/M2 | SYSTOLIC BLOOD PRESSURE: 113 MMHG | WEIGHT: 128.63 LBS | HEIGHT: 64 IN | TEMPERATURE: 98 F | RESPIRATION RATE: 16 BRPM | HEART RATE: 64 BPM

## 2022-06-30 DIAGNOSIS — Z51.81 ENCOUNTER FOR MONITORING TAMOXIFEN THERAPY: ICD-10-CM

## 2022-06-30 DIAGNOSIS — Z79.810 ENCOUNTER FOR MONITORING TAMOXIFEN THERAPY: ICD-10-CM

## 2022-06-30 DIAGNOSIS — Z17.0 MALIGNANT NEOPLASM OF UPPER-OUTER QUADRANT OF LEFT BREAST IN FEMALE, ESTROGEN RECEPTOR POSITIVE (HCC): Primary | ICD-10-CM

## 2022-06-30 DIAGNOSIS — C50.412 MALIGNANT NEOPLASM OF UPPER-OUTER QUADRANT OF LEFT BREAST IN FEMALE, ESTROGEN RECEPTOR POSITIVE (HCC): Primary | ICD-10-CM

## 2022-06-30 PROCEDURE — 99214 OFFICE O/P EST MOD 30 MIN: CPT | Performed by: INTERNAL MEDICINE

## 2022-08-10 ENCOUNTER — TELEPHONE (OUTPATIENT)
Dept: SURGERY | Facility: CLINIC | Age: 52
End: 2022-08-10

## 2022-08-10 NOTE — TELEPHONE ENCOUNTER
Received a photo of patients left breast that shows significant erythema. Patient states this is unchanged since surgery in February. Patient denies fever, swelling or warmth to the touch. She is going to sent a new photo of the bilateral breasts for comparison. She is scheduled with Dr. Dora Zepeda on 8/19/22.

## 2022-08-19 ENCOUNTER — OFFICE VISIT (OUTPATIENT)
Dept: SURGERY | Facility: CLINIC | Age: 52
End: 2022-08-19
Payer: COMMERCIAL

## 2022-08-19 DIAGNOSIS — C50.412 MALIGNANT NEOPLASM OF UPPER-OUTER QUADRANT OF LEFT BREAST IN FEMALE, ESTROGEN RECEPTOR POSITIVE (HCC): ICD-10-CM

## 2022-08-19 DIAGNOSIS — Z90.13 ABSENCE OF BREAST, BILATERAL: Primary | ICD-10-CM

## 2022-08-19 DIAGNOSIS — Z17.0 MALIGNANT NEOPLASM OF UPPER-OUTER QUADRANT OF LEFT BREAST IN FEMALE, ESTROGEN RECEPTOR POSITIVE (HCC): ICD-10-CM

## 2022-08-19 PROCEDURE — 99212 OFFICE O/P EST SF 10 MIN: CPT | Performed by: SURGERY

## 2022-08-19 NOTE — PROGRESS NOTES
Froilan Morse is a 45year old female who presents today for a follow-up. She reports noting mild erythema of the left breast intermittently for several months. She denies fevers or chills. Physical Examination:  Breasts: General inspection of the breasts shows good shape and symmetry. Incisions are well-healed. The left breast is noted to have trace edema of the lower pole skin with trace hyperemia which resolves with massage. .  There is no luis daniel erythema noted. There is no obvious seroma noted. Assessment and Plan:  The patient was instructed to massage the areas of edema. She was instructed to call for fevers, chills, or worsening erythema. She will follow-up in 3 months for reassessment. The plan was reviewed with the patient and her sister and questions were answered.

## 2022-08-23 ENCOUNTER — GENETICS ENCOUNTER (OUTPATIENT)
Dept: HEMATOLOGY/ONCOLOGY | Facility: HOSPITAL | Age: 52
End: 2022-08-23

## 2022-08-23 NOTE — PROGRESS NOTES
NBN VUS identified in 2021 has been downgraded by [employer] to likely benign. POLD1 VUS is still a VUS. [employer] message sent to patient.

## 2022-08-24 ENCOUNTER — OFFICE VISIT (OUTPATIENT)
Dept: FAMILY MEDICINE CLINIC | Facility: CLINIC | Age: 52
End: 2022-08-24
Payer: COMMERCIAL

## 2022-08-24 ENCOUNTER — LAB ENCOUNTER (OUTPATIENT)
Dept: LAB | Age: 52
End: 2022-08-24
Attending: FAMILY MEDICINE
Payer: COMMERCIAL

## 2022-08-24 VITALS
HEART RATE: 64 BPM | SYSTOLIC BLOOD PRESSURE: 108 MMHG | WEIGHT: 128.19 LBS | BODY MASS INDEX: 21.89 KG/M2 | DIASTOLIC BLOOD PRESSURE: 73 MMHG | HEIGHT: 64 IN | TEMPERATURE: 98 F

## 2022-08-24 DIAGNOSIS — E55.9 VITAMIN D DEFICIENCY: ICD-10-CM

## 2022-08-24 DIAGNOSIS — Z00.00 ROUTINE MEDICAL EXAM: Primary | ICD-10-CM

## 2022-08-24 DIAGNOSIS — Z00.00 ROUTINE MEDICAL EXAM: ICD-10-CM

## 2022-08-24 DIAGNOSIS — Z85.3 HISTORY OF INFILTRATING DUCTAL CARCINOMA OF BREAST: ICD-10-CM

## 2022-08-24 LAB
ALBUMIN SERPL-MCNC: 3.9 G/DL (ref 3.4–5)
ALBUMIN/GLOB SERPL: 1.2 {RATIO} (ref 1–2)
ALP LIVER SERPL-CCNC: 51 U/L
ALT SERPL-CCNC: 35 U/L
ANION GAP SERPL CALC-SCNC: 7 MMOL/L (ref 0–18)
AST SERPL-CCNC: 20 U/L (ref 15–37)
BASOPHILS # BLD AUTO: 0.03 X10(3) UL (ref 0–0.2)
BASOPHILS NFR BLD AUTO: 0.4 %
BILIRUB SERPL-MCNC: 0.5 MG/DL (ref 0.1–2)
BUN BLD-MCNC: 14 MG/DL (ref 7–18)
BUN/CREAT SERPL: 19.4 (ref 10–20)
CALCIUM BLD-MCNC: 9.6 MG/DL (ref 8.5–10.1)
CHLORIDE SERPL-SCNC: 108 MMOL/L (ref 98–112)
CHOLEST SERPL-MCNC: 191 MG/DL (ref ?–200)
CO2 SERPL-SCNC: 28 MMOL/L (ref 21–32)
CREAT BLD-MCNC: 0.72 MG/DL
DEPRECATED RDW RBC AUTO: 42.5 FL (ref 35.1–46.3)
EOSINOPHIL # BLD AUTO: 0.1 X10(3) UL (ref 0–0.7)
EOSINOPHIL NFR BLD AUTO: 1.3 %
ERYTHROCYTE [DISTWIDTH] IN BLOOD BY AUTOMATED COUNT: 12.1 % (ref 11–15)
FASTING PATIENT LIPID ANSWER: YES
FASTING STATUS PATIENT QL REPORTED: YES
GFR SERPLBLD BASED ON 1.73 SQ M-ARVRAT: 99 ML/MIN/1.73M2 (ref 60–?)
GLOBULIN PLAS-MCNC: 3.3 G/DL (ref 2.8–4.4)
GLUCOSE BLD-MCNC: 102 MG/DL (ref 70–99)
HCT VFR BLD AUTO: 46.1 %
HDLC SERPL-MCNC: 79 MG/DL (ref 40–59)
HGB BLD-MCNC: 14.9 G/DL
IMM GRANULOCYTES # BLD AUTO: 0.02 X10(3) UL (ref 0–1)
IMM GRANULOCYTES NFR BLD: 0.3 %
LDLC SERPL CALC-MCNC: 92 MG/DL (ref ?–100)
LYMPHOCYTES # BLD AUTO: 1.77 X10(3) UL (ref 1–4)
LYMPHOCYTES NFR BLD AUTO: 23.2 %
MCH RBC QN AUTO: 30.8 PG (ref 26–34)
MCHC RBC AUTO-ENTMCNC: 32.3 G/DL (ref 31–37)
MCV RBC AUTO: 95.2 FL
MONOCYTES # BLD AUTO: 0.43 X10(3) UL (ref 0.1–1)
MONOCYTES NFR BLD AUTO: 5.6 %
NEUTROPHILS # BLD AUTO: 5.28 X10 (3) UL (ref 1.5–7.7)
NEUTROPHILS # BLD AUTO: 5.28 X10(3) UL (ref 1.5–7.7)
NEUTROPHILS NFR BLD AUTO: 69.2 %
NONHDLC SERPL-MCNC: 112 MG/DL (ref ?–130)
OSMOLALITY SERPL CALC.SUM OF ELEC: 297 MOSM/KG (ref 275–295)
PLATELET # BLD AUTO: 220 10(3)UL (ref 150–450)
POTASSIUM SERPL-SCNC: 4.7 MMOL/L (ref 3.5–5.1)
PROT SERPL-MCNC: 7.2 G/DL (ref 6.4–8.2)
RBC # BLD AUTO: 4.84 X10(6)UL
SODIUM SERPL-SCNC: 143 MMOL/L (ref 136–145)
TRIGL SERPL-MCNC: 113 MG/DL (ref 30–149)
TSI SER-ACNC: 1.87 MIU/ML (ref 0.36–3.74)
VIT B12 SERPL-MCNC: 302 PG/ML (ref 193–986)
VIT D+METAB SERPL-MCNC: 35.7 NG/ML (ref 30–100)
VLDLC SERPL CALC-MCNC: 18 MG/DL (ref 0–30)
WBC # BLD AUTO: 7.6 X10(3) UL (ref 4–11)

## 2022-08-24 PROCEDURE — 3074F SYST BP LT 130 MM HG: CPT | Performed by: FAMILY MEDICINE

## 2022-08-24 PROCEDURE — 3008F BODY MASS INDEX DOCD: CPT | Performed by: FAMILY MEDICINE

## 2022-08-24 PROCEDURE — 85025 COMPLETE CBC W/AUTO DIFF WBC: CPT

## 2022-08-24 PROCEDURE — 3078F DIAST BP <80 MM HG: CPT | Performed by: FAMILY MEDICINE

## 2022-08-24 PROCEDURE — 90750 HZV VACC RECOMBINANT IM: CPT | Performed by: FAMILY MEDICINE

## 2022-08-24 PROCEDURE — 36415 COLL VENOUS BLD VENIPUNCTURE: CPT

## 2022-08-24 PROCEDURE — 80061 LIPID PANEL: CPT

## 2022-08-24 PROCEDURE — 82607 VITAMIN B-12: CPT

## 2022-08-24 PROCEDURE — 84443 ASSAY THYROID STIM HORMONE: CPT

## 2022-08-24 PROCEDURE — 90471 IMMUNIZATION ADMIN: CPT | Performed by: FAMILY MEDICINE

## 2022-08-24 PROCEDURE — 80053 COMPREHEN METABOLIC PANEL: CPT

## 2022-08-24 PROCEDURE — 82306 VITAMIN D 25 HYDROXY: CPT

## 2022-08-24 PROCEDURE — 99396 PREV VISIT EST AGE 40-64: CPT | Performed by: FAMILY MEDICINE

## 2022-10-06 ENCOUNTER — TELEPHONE (OUTPATIENT)
Dept: OBGYN CLINIC | Facility: CLINIC | Age: 52
End: 2022-10-06

## 2022-10-06 ENCOUNTER — OFFICE VISIT (OUTPATIENT)
Dept: OBGYN CLINIC | Facility: CLINIC | Age: 52
End: 2022-10-06
Payer: COMMERCIAL

## 2022-10-06 VITALS
DIASTOLIC BLOOD PRESSURE: 64 MMHG | HEART RATE: 64 BPM | SYSTOLIC BLOOD PRESSURE: 97 MMHG | HEIGHT: 63.7 IN | BODY MASS INDEX: 22.29 KG/M2 | WEIGHT: 129 LBS

## 2022-10-06 DIAGNOSIS — N93.9 ABNORMAL UTERINE BLEEDING: ICD-10-CM

## 2022-10-06 DIAGNOSIS — Z01.411 ENCOUNTER FOR GYNECOLOGICAL EXAMINATION WITH ABNORMAL FINDING: Primary | ICD-10-CM

## 2022-10-06 DIAGNOSIS — Z85.3 HISTORY OF BREAST CANCER: ICD-10-CM

## 2022-10-06 PROCEDURE — 3008F BODY MASS INDEX DOCD: CPT | Performed by: OBSTETRICS & GYNECOLOGY

## 2022-10-06 PROCEDURE — 99386 PREV VISIT NEW AGE 40-64: CPT | Performed by: OBSTETRICS & GYNECOLOGY

## 2022-10-06 PROCEDURE — 3074F SYST BP LT 130 MM HG: CPT | Performed by: OBSTETRICS & GYNECOLOGY

## 2022-10-06 PROCEDURE — 3078F DIAST BP <80 MM HG: CPT | Performed by: OBSTETRICS & GYNECOLOGY

## 2022-10-06 RX ORDER — MISOPROSTOL 200 UG/1
TABLET ORAL
Qty: 2 TABLET | Refills: 0 | Status: SHIPPED | OUTPATIENT
Start: 2022-10-06

## 2022-10-06 NOTE — TELEPHONE ENCOUNTER
Pt accepted an appt with [facility] for embx. Aware of cytotec and aleve prep. No preg test needed per notes.

## 2022-10-18 ENCOUNTER — OFFICE VISIT (OUTPATIENT)
Dept: OBGYN CLINIC | Facility: CLINIC | Age: 52
End: 2022-10-18
Payer: COMMERCIAL

## 2022-10-18 VITALS
BODY MASS INDEX: 22 KG/M2 | SYSTOLIC BLOOD PRESSURE: 116 MMHG | WEIGHT: 129 LBS | HEART RATE: 66 BPM | DIASTOLIC BLOOD PRESSURE: 73 MMHG

## 2022-10-18 DIAGNOSIS — N93.9 ABNORMAL UTERINE BLEEDING (AUB): Primary | ICD-10-CM

## 2022-10-18 DIAGNOSIS — Z85.3 HISTORY OF BREAST CANCER: ICD-10-CM

## 2022-10-18 PROCEDURE — 58100 BIOPSY OF UTERUS LINING: CPT | Performed by: OBSTETRICS & GYNECOLOGY

## 2022-10-18 PROCEDURE — 3078F DIAST BP <80 MM HG: CPT | Performed by: OBSTETRICS & GYNECOLOGY

## 2022-10-18 PROCEDURE — 3074F SYST BP LT 130 MM HG: CPT | Performed by: OBSTETRICS & GYNECOLOGY

## 2022-10-18 NOTE — PROCEDURES
Endometrial Biopsy     Pre-Procedure Care:   Consent was obtained. Procedure/risks were explained. Questions were answered. Correct patient was identified. Correct side and site were confirmed. Pregnancy Results: never ordered  Birth control method(s) used: Indication: heavy period    Pre-Medications: The patient was premedicated with Cytotec and Naproxen Sodium. Description of Procedure:   A bivalve speculum was placed in the vagina and the cervix was prepped with betadine solution. Single tooth tenaculum placed at the 12 o'clock position. The uterine cavity was sounded at 7 cm. The endometrial cavity was curetted for pipelle tissue sampling with 2 passes. Specimen was sent to pathology. The single tooth tenaculum was removed. Silver nitrate was applied at the site of tenaculum application   Good hemostasis was noted. There were no complications. There was no blood loss. Discharge instructions were provided to the patient. Visit Plan:  Await results.

## 2022-12-02 ENCOUNTER — OFFICE VISIT (OUTPATIENT)
Dept: SURGERY | Facility: CLINIC | Age: 52
End: 2022-12-02
Payer: COMMERCIAL

## 2022-12-02 DIAGNOSIS — Z90.13 ABSENCE OF BREAST, BILATERAL: Primary | ICD-10-CM

## 2022-12-02 PROCEDURE — 99212 OFFICE O/P EST SF 10 MIN: CPT | Performed by: SURGERY

## 2022-12-02 NOTE — PROGRESS NOTES
Levy Ormond is a 45year old female who presents today for a follow-up after undergoing implant placement in February. She reports persistent hyperemia of her left breast skin. Physical Examination:  Breasts: General inspection of the breast shows good shape and symmetry. There are no palpable breast masses noted. The left breast is noted to have mild hyperemia in the lower pole where a positive subcutaneous fat is noted. There are no palpable breast masses noted. There is no erythema or seroma noted. Assessment and Plan:  Patient doing well. She instructed continue regular self breast examination. She will return for follow-up in 1 year or sooner if any changes develop. The plan was reviewed with the patient and questions were answered.

## 2022-12-29 ENCOUNTER — OFFICE VISIT (OUTPATIENT)
Dept: HEMATOLOGY/ONCOLOGY | Facility: HOSPITAL | Age: 52
End: 2022-12-29
Attending: INTERNAL MEDICINE
Payer: COMMERCIAL

## 2022-12-29 VITALS
TEMPERATURE: 98 F | HEART RATE: 63 BPM | RESPIRATION RATE: 16 BRPM | BODY MASS INDEX: 22.2 KG/M2 | SYSTOLIC BLOOD PRESSURE: 106 MMHG | WEIGHT: 130 LBS | OXYGEN SATURATION: 100 % | DIASTOLIC BLOOD PRESSURE: 68 MMHG | HEIGHT: 64 IN

## 2022-12-29 DIAGNOSIS — Z51.81 ENCOUNTER FOR MONITORING TAMOXIFEN THERAPY: ICD-10-CM

## 2022-12-29 DIAGNOSIS — Z17.0 MALIGNANT NEOPLASM OF UPPER-OUTER QUADRANT OF LEFT BREAST IN FEMALE, ESTROGEN RECEPTOR POSITIVE (HCC): Primary | ICD-10-CM

## 2022-12-29 DIAGNOSIS — Z85.3 ENCOUNTER FOR FOLLOW-UP SURVEILLANCE OF BREAST CANCER: ICD-10-CM

## 2022-12-29 DIAGNOSIS — N91.2 AMENORRHEA: ICD-10-CM

## 2022-12-29 DIAGNOSIS — C50.412 MALIGNANT NEOPLASM OF UPPER-OUTER QUADRANT OF LEFT BREAST IN FEMALE, ESTROGEN RECEPTOR POSITIVE (HCC): Primary | ICD-10-CM

## 2022-12-29 DIAGNOSIS — Z79.810 ENCOUNTER FOR MONITORING TAMOXIFEN THERAPY: ICD-10-CM

## 2022-12-29 DIAGNOSIS — Z08 ENCOUNTER FOR FOLLOW-UP SURVEILLANCE OF BREAST CANCER: ICD-10-CM

## 2022-12-29 PROCEDURE — 99214 OFFICE O/P EST MOD 30 MIN: CPT | Performed by: INTERNAL MEDICINE

## 2023-01-04 ENCOUNTER — LAB ENCOUNTER (OUTPATIENT)
Dept: LAB | Age: 53
End: 2023-01-04
Attending: INTERNAL MEDICINE
Payer: COMMERCIAL

## 2023-01-04 DIAGNOSIS — N91.2 AMENORRHEA: ICD-10-CM

## 2023-01-04 LAB
ESTRADIOL SERPL-MCNC: 27.1 PG/ML
FSH SERPL-ACNC: 32.8 MIU/ML
LH SERPL-ACNC: 14.6 MIU/ML

## 2023-01-04 PROCEDURE — 82670 ASSAY OF TOTAL ESTRADIOL: CPT

## 2023-01-04 PROCEDURE — 83002 ASSAY OF GONADOTROPIN (LH): CPT

## 2023-01-04 PROCEDURE — 36415 COLL VENOUS BLD VENIPUNCTURE: CPT

## 2023-01-04 PROCEDURE — 83001 ASSAY OF GONADOTROPIN (FSH): CPT

## 2023-02-24 ENCOUNTER — NURSE ONLY (OUTPATIENT)
Dept: FAMILY MEDICINE CLINIC | Facility: CLINIC | Age: 53
End: 2023-02-24

## 2023-02-24 DIAGNOSIS — Z23 NEED FOR VACCINATION: Primary | ICD-10-CM

## 2023-02-24 PROCEDURE — 90750 HZV VACC RECOMBINANT IM: CPT | Performed by: FAMILY MEDICINE

## 2023-02-24 PROCEDURE — 90471 IMMUNIZATION ADMIN: CPT | Performed by: FAMILY MEDICINE

## 2023-04-04 ENCOUNTER — TELEPHONE (OUTPATIENT)
Dept: GASTROENTEROLOGY | Facility: CLINIC | Age: 53
End: 2023-04-04

## 2023-04-04 NOTE — TELEPHONE ENCOUNTER
----- Message from Mary Ann Ha RN sent at 2018  8:54 AM CDT -----  Regardin yr CLN recall  Entered into EPIC:Recall colon in 5 years per Dr. Sanya Mora. Last Colon done 18, next due 23. Snapshot updated.

## 2023-04-11 ENCOUNTER — NURSE ONLY (OUTPATIENT)
Facility: CLINIC | Age: 53
End: 2023-04-11

## 2023-04-11 DIAGNOSIS — Z80.0 FAMILY HISTORY OF COLON CANCER: Primary | ICD-10-CM

## 2023-04-13 NOTE — PROGRESS NOTES
Scheduled for:  Colonoscopy [id number]  Provider Name:  Dr. Mague Li  Date:  6/27/2023  Location:  [address] [place] [address] 1  Sedation:  MAC  Time:  11:15 am, arrival 10:15 am  Prep:  Golytely  Meds/Allergies Reconciled?:  Yes  Diagnosis with codes:  Family hx of colon cancer Z80.0  Was patient informed to call insurance with codes (Y/N):  Yes  Referral sent?:  Referral was sent at the time of electronic surgical scheduling. [address] [facility] or [address] [place], [place] [address]Th  notified?:  I sent an electronic request to Endo Scheduling and received a confirmation today. Medication Orders:  N/A  Misc Orders:  N/A     Further instructions given by staff: Informed patient I will send prep instruction sheet via [employer] and she verbalized understanding.

## 2023-06-05 ENCOUNTER — OFFICE VISIT (OUTPATIENT)
Dept: SURGERY | Facility: CLINIC | Age: 53
End: 2023-06-05
Payer: COMMERCIAL

## 2023-06-05 DIAGNOSIS — C50.412 MALIGNANT NEOPLASM OF UPPER-OUTER QUADRANT OF LEFT BREAST IN FEMALE, ESTROGEN RECEPTOR POSITIVE (HCC): Primary | ICD-10-CM

## 2023-06-05 DIAGNOSIS — Z17.0 MALIGNANT NEOPLASM OF UPPER-OUTER QUADRANT OF LEFT BREAST IN FEMALE, ESTROGEN RECEPTOR POSITIVE (HCC): Primary | ICD-10-CM

## 2023-06-05 PROCEDURE — 99214 OFFICE O/P EST MOD 30 MIN: CPT

## 2023-06-15 RX ORDER — SODIUM CHLORIDE, SODIUM LACTATE, POTASSIUM CHLORIDE, CALCIUM CHLORIDE 600; 310; 30; 20 MG/100ML; MG/100ML; MG/100ML; MG/100ML
INJECTION, SOLUTION INTRAVENOUS CONTINUOUS
OUTPATIENT
Start: 2023-06-15

## 2023-06-15 RX ORDER — CHOLECALCIFEROL (VITAMIN D3) 50 MCG
TABLET ORAL
COMMUNITY

## 2023-06-15 RX ORDER — SODIUM CHLORIDE 0.9 % (FLUSH) 0.9 %
10 SYRINGE (ML) INJECTION AS NEEDED
OUTPATIENT
Start: 2023-06-15

## 2023-06-27 ENCOUNTER — HOSPITAL ENCOUNTER (OUTPATIENT)
Age: 53
Setting detail: HOSPITAL OUTPATIENT SURGERY
Discharge: HOME | End: 2023-06-27
Attending: INTERNAL MEDICINE | Admitting: INTERNAL MEDICINE
Payer: COMMERCIAL

## 2023-06-27 ENCOUNTER — ANESTHESIA EVENT (OUTPATIENT)
Dept: ENDOSCOPY | Age: 53
End: 2023-06-27
Payer: COMMERCIAL

## 2023-06-27 ENCOUNTER — ANESTHESIA (OUTPATIENT)
Dept: ENDOSCOPY | Age: 53
End: 2023-06-27
Payer: COMMERCIAL

## 2023-06-27 VITALS
TEMPERATURE: 98 F | SYSTOLIC BLOOD PRESSURE: 95 MMHG | DIASTOLIC BLOOD PRESSURE: 68 MMHG | HEART RATE: 60 BPM | WEIGHT: 130 LBS | BODY MASS INDEX: 23.04 KG/M2 | HEIGHT: 63 IN | RESPIRATION RATE: 15 BRPM | OXYGEN SATURATION: 100 %

## 2023-06-27 DIAGNOSIS — Z80.0 FAMILY HISTORY OF COLON CANCER: ICD-10-CM

## 2023-06-27 LAB — B-HCG UR QL: NEGATIVE

## 2023-06-27 PROCEDURE — 45378 DIAGNOSTIC COLONOSCOPY: CPT | Performed by: INTERNAL MEDICINE

## 2023-06-27 PROCEDURE — 81025 URINE PREGNANCY TEST: CPT

## 2023-06-27 RX ORDER — SODIUM CHLORIDE, SODIUM LACTATE, POTASSIUM CHLORIDE, CALCIUM CHLORIDE 600; 310; 30; 20 MG/100ML; MG/100ML; MG/100ML; MG/100ML
INJECTION, SOLUTION INTRAVENOUS CONTINUOUS
OUTPATIENT
Start: 2023-06-27

## 2023-06-27 RX ORDER — SODIUM CHLORIDE, SODIUM LACTATE, POTASSIUM CHLORIDE, CALCIUM CHLORIDE 600; 310; 30; 20 MG/100ML; MG/100ML; MG/100ML; MG/100ML
INJECTION, SOLUTION INTRAVENOUS CONTINUOUS PRN
Status: DISCONTINUED | OUTPATIENT
Start: 2023-06-27 | End: 2023-06-27 | Stop reason: SURG

## 2023-06-27 RX ORDER — LIDOCAINE HYDROCHLORIDE 10 MG/ML
INJECTION, SOLUTION EPIDURAL; INFILTRATION; INTRACAUDAL; PERINEURAL AS NEEDED
Status: DISCONTINUED | OUTPATIENT
Start: 2023-06-27 | End: 2023-06-27 | Stop reason: SURG

## 2023-06-27 RX ORDER — SODIUM CHLORIDE, SODIUM LACTATE, POTASSIUM CHLORIDE, CALCIUM CHLORIDE 600; 310; 30; 20 MG/100ML; MG/100ML; MG/100ML; MG/100ML
INJECTION, SOLUTION INTRAVENOUS CONTINUOUS
Status: DISCONTINUED | OUTPATIENT
Start: 2023-06-27 | End: 2023-06-27

## 2023-06-27 RX ORDER — NALOXONE HYDROCHLORIDE 0.4 MG/ML
80 INJECTION, SOLUTION INTRAMUSCULAR; INTRAVENOUS; SUBCUTANEOUS AS NEEDED
Status: DISCONTINUED | OUTPATIENT
Start: 2023-06-27 | End: 2023-06-27

## 2023-06-27 RX ADMIN — LIDOCAINE HYDROCHLORIDE 50 MG: 10 INJECTION, SOLUTION EPIDURAL; INFILTRATION; INTRACAUDAL; PERINEURAL at 11:11:00

## 2023-06-27 RX ADMIN — SODIUM CHLORIDE, SODIUM LACTATE, POTASSIUM CHLORIDE, CALCIUM CHLORIDE: 600; 310; 30; 20 INJECTION, SOLUTION INTRAVENOUS at 11:16:00

## 2023-06-27 RX ADMIN — SODIUM CHLORIDE, SODIUM LACTATE, POTASSIUM CHLORIDE, CALCIUM CHLORIDE: 600; 310; 30; 20 INJECTION, SOLUTION INTRAVENOUS at 11:28:00

## 2023-06-27 RX ADMIN — SODIUM CHLORIDE, SODIUM LACTATE, POTASSIUM CHLORIDE, CALCIUM CHLORIDE: 600; 310; 30; 20 INJECTION, SOLUTION INTRAVENOUS at 11:11:00

## 2023-06-29 ENCOUNTER — MED REC SCAN ONLY (OUTPATIENT)
Facility: CLINIC | Age: 53
End: 2023-06-29

## 2023-06-29 ENCOUNTER — OFFICE VISIT (OUTPATIENT)
Dept: HEMATOLOGY/ONCOLOGY | Facility: HOSPITAL | Age: 53
End: 2023-06-29
Attending: INTERNAL MEDICINE
Payer: COMMERCIAL

## 2023-06-29 VITALS
TEMPERATURE: 99 F | DIASTOLIC BLOOD PRESSURE: 77 MMHG | HEIGHT: 64 IN | RESPIRATION RATE: 16 BRPM | OXYGEN SATURATION: 100 % | BODY MASS INDEX: 22.43 KG/M2 | WEIGHT: 131.38 LBS | SYSTOLIC BLOOD PRESSURE: 100 MMHG | HEART RATE: 66 BPM

## 2023-06-29 DIAGNOSIS — N91.2 AMENORRHEA: ICD-10-CM

## 2023-06-29 DIAGNOSIS — Z08 ENCOUNTER FOR FOLLOW-UP SURVEILLANCE OF BREAST CANCER: ICD-10-CM

## 2023-06-29 DIAGNOSIS — Z51.81 ENCOUNTER FOR MONITORING TAMOXIFEN THERAPY: ICD-10-CM

## 2023-06-29 DIAGNOSIS — Z85.3 ENCOUNTER FOR FOLLOW-UP SURVEILLANCE OF BREAST CANCER: ICD-10-CM

## 2023-06-29 DIAGNOSIS — Z17.0 MALIGNANT NEOPLASM OF UPPER-OUTER QUADRANT OF LEFT BREAST IN FEMALE, ESTROGEN RECEPTOR POSITIVE (HCC): Primary | ICD-10-CM

## 2023-06-29 DIAGNOSIS — Z79.810 ENCOUNTER FOR MONITORING TAMOXIFEN THERAPY: ICD-10-CM

## 2023-06-29 DIAGNOSIS — C50.412 MALIGNANT NEOPLASM OF UPPER-OUTER QUADRANT OF LEFT BREAST IN FEMALE, ESTROGEN RECEPTOR POSITIVE (HCC): Primary | ICD-10-CM

## 2023-06-29 PROCEDURE — 99214 OFFICE O/P EST MOD 30 MIN: CPT | Performed by: INTERNAL MEDICINE

## 2023-07-14 ENCOUNTER — PATIENT MESSAGE (OUTPATIENT)
Dept: HEMATOLOGY/ONCOLOGY | Facility: HOSPITAL | Age: 53
End: 2023-07-14

## 2023-07-14 DIAGNOSIS — M25.552 LEFT HIP PAIN: Primary | ICD-10-CM

## 2023-07-14 NOTE — TELEPHONE ENCOUNTER
From: Joseph Magallanes  To: Nnamdi Benson MD  Sent: 7/14/2023 2:57 PM CDT  Subject: Pain in both knees & left hip/leg    Hi Dr. Tian Velasco,    Since July 5th 2023, I've been having more discomfort and weakness in both knees and left hip/leg. I'm not sure if it's joint pain or muscle pain. I also have some discomfort in my back. I've used ice packs, elevated knees, stopped going for walks and took Advil for a week but that didn't help. I feel like it may be attributed to the Tamoxifen but not sure. Do you think I should have some type of test to see what's going on?     Thank you for your time,  Ruben Smith  [phone]

## 2023-07-16 ENCOUNTER — HOSPITAL ENCOUNTER (OUTPATIENT)
Dept: GENERAL RADIOLOGY | Age: 53
Discharge: HOME | End: 2023-07-16
Attending: INTERNAL MEDICINE
Payer: COMMERCIAL

## 2023-07-16 ENCOUNTER — HOSPITAL ENCOUNTER (OUTPATIENT)
Dept: GENERAL RADIOLOGY | Age: 53
End: 2023-07-16
Attending: INTERNAL MEDICINE
Payer: COMMERCIAL

## 2023-07-16 DIAGNOSIS — M25.552 LEFT HIP PAIN: ICD-10-CM

## 2023-07-16 PROCEDURE — 72110 X-RAY EXAM L-2 SPINE 4/>VWS: CPT | Performed by: INTERNAL MEDICINE

## 2023-07-16 PROCEDURE — 73502 X-RAY EXAM HIP UNI 2-3 VIEWS: CPT | Performed by: INTERNAL MEDICINE

## 2023-08-29 ENCOUNTER — OFFICE VISIT (OUTPATIENT)
Dept: PHYSICAL MEDICINE AND REHAB | Facility: CLINIC | Age: 53
End: 2023-08-29
Payer: COMMERCIAL

## 2023-08-29 ENCOUNTER — HOSPITAL ENCOUNTER (OUTPATIENT)
Dept: GENERAL RADIOLOGY | Age: 53
Discharge: HOME | End: 2023-08-29
Attending: PHYSICAL MEDICINE & REHABILITATION
Payer: COMMERCIAL

## 2023-08-29 VITALS — DIASTOLIC BLOOD PRESSURE: 72 MMHG | SYSTOLIC BLOOD PRESSURE: 110 MMHG

## 2023-08-29 DIAGNOSIS — M51.36 BULGE OF LUMBAR DISC WITHOUT MYELOPATHY: ICD-10-CM

## 2023-08-29 DIAGNOSIS — M47.816 FACET SYNDROME, LUMBAR: ICD-10-CM

## 2023-08-29 DIAGNOSIS — M54.59 MECHANICAL LOW BACK PAIN: ICD-10-CM

## 2023-08-29 DIAGNOSIS — S76.019A STRAIN OF GLUTEUS MEDIUS, UNSPECIFIED LATERALITY, INITIAL ENCOUNTER: ICD-10-CM

## 2023-08-29 DIAGNOSIS — M54.16 LUMBAR RADICULOPATHY: ICD-10-CM

## 2023-08-29 DIAGNOSIS — Z17.0 MALIGNANT NEOPLASM OF UPPER-OUTER QUADRANT OF LEFT BREAST IN FEMALE, ESTROGEN RECEPTOR POSITIVE: ICD-10-CM

## 2023-08-29 DIAGNOSIS — M25.462 EFFUSION OF BURSA OF LEFT KNEE: ICD-10-CM

## 2023-08-29 DIAGNOSIS — M47.816 LUMBAR SPONDYLOSIS: ICD-10-CM

## 2023-08-29 DIAGNOSIS — M51.37 DDD (DEGENERATIVE DISC DISEASE), LUMBOSACRAL: ICD-10-CM

## 2023-08-29 DIAGNOSIS — M48.061 LUMBAR FORAMINAL STENOSIS: ICD-10-CM

## 2023-08-29 DIAGNOSIS — C50.412 MALIGNANT NEOPLASM OF UPPER-OUTER QUADRANT OF LEFT BREAST IN FEMALE, ESTROGEN RECEPTOR POSITIVE: ICD-10-CM

## 2023-08-29 DIAGNOSIS — M22.2X9 PATELLOFEMORAL PAIN SYNDROME, UNSPECIFIED LATERALITY: ICD-10-CM

## 2023-08-29 DIAGNOSIS — M70.61 GREATER TROCHANTERIC BURSITIS OF BOTH HIPS: ICD-10-CM

## 2023-08-29 DIAGNOSIS — M79.10 MYALGIA: ICD-10-CM

## 2023-08-29 DIAGNOSIS — M70.62 GREATER TROCHANTERIC BURSITIS OF BOTH HIPS: ICD-10-CM

## 2023-08-29 DIAGNOSIS — M79.10 MYALGIA: Primary | ICD-10-CM

## 2023-08-29 PROCEDURE — 99204 OFFICE O/P NEW MOD 45 MIN: CPT | Performed by: PHYSICAL MEDICINE & REHABILITATION

## 2023-08-29 PROCEDURE — 3078F DIAST BP <80 MM HG: CPT | Performed by: PHYSICAL MEDICINE & REHABILITATION

## 2023-08-29 PROCEDURE — 3074F SYST BP LT 130 MM HG: CPT | Performed by: PHYSICAL MEDICINE & REHABILITATION

## 2023-08-29 PROCEDURE — 73564 X-RAY EXAM KNEE 4 OR MORE: CPT | Performed by: PHYSICAL MEDICINE & REHABILITATION

## 2023-08-30 ENCOUNTER — LAB ENCOUNTER (OUTPATIENT)
Dept: LAB | Age: 53
End: 2023-08-30
Attending: FAMILY MEDICINE
Payer: COMMERCIAL

## 2023-08-30 ENCOUNTER — OFFICE VISIT (OUTPATIENT)
Dept: FAMILY MEDICINE CLINIC | Facility: CLINIC | Age: 53
End: 2023-08-30

## 2023-08-30 VITALS
WEIGHT: 134.19 LBS | SYSTOLIC BLOOD PRESSURE: 116 MMHG | DIASTOLIC BLOOD PRESSURE: 80 MMHG | HEIGHT: 64 IN | BODY MASS INDEX: 22.91 KG/M2 | HEART RATE: 67 BPM

## 2023-08-30 DIAGNOSIS — N91.2 AMENORRHEA: ICD-10-CM

## 2023-08-30 DIAGNOSIS — E55.9 VITAMIN D DEFICIENCY: ICD-10-CM

## 2023-08-30 DIAGNOSIS — Z85.3 HISTORY OF INFILTRATING DUCTAL CARCINOMA OF BREAST: ICD-10-CM

## 2023-08-30 DIAGNOSIS — Z00.00 ROUTINE MEDICAL EXAM: Primary | ICD-10-CM

## 2023-08-30 DIAGNOSIS — M25.60 JOINT STIFFNESS: ICD-10-CM

## 2023-08-30 DIAGNOSIS — Z78.0 POST-MENOPAUSAL: ICD-10-CM

## 2023-08-30 DIAGNOSIS — Z00.00 ROUTINE MEDICAL EXAM: ICD-10-CM

## 2023-08-30 LAB
ALBUMIN SERPL-MCNC: 4 G/DL (ref 3.4–5)
ALBUMIN/GLOB SERPL: 1.1 {RATIO} (ref 1–2)
ALP LIVER SERPL-CCNC: 59 U/L
ALT SERPL-CCNC: 32 U/L
ANION GAP SERPL CALC-SCNC: 2 MMOL/L (ref 0–18)
AST SERPL-CCNC: 21 U/L (ref 15–37)
BASOPHILS # BLD AUTO: 0.03 X10(3) UL (ref 0–0.2)
BASOPHILS NFR BLD AUTO: 0.4 %
BILIRUB SERPL-MCNC: 0.5 MG/DL (ref 0.1–2)
BUN BLD-MCNC: 16 MG/DL (ref 7–18)
CALCIUM BLD-MCNC: 9.4 MG/DL (ref 8.5–10.1)
CHLORIDE SERPL-SCNC: 108 MMOL/L (ref 98–112)
CHOLEST SERPL-MCNC: 207 MG/DL (ref ?–200)
CO2 SERPL-SCNC: 29 MMOL/L (ref 21–32)
CREAT BLD-MCNC: 0.74 MG/DL
CRP SERPL-MCNC: <0.29 MG/DL (ref ?–0.3)
EGFRCR SERPLBLD CKD-EPI 2021: 95 ML/MIN/1.73M2 (ref 60–?)
EOSINOPHIL # BLD AUTO: 0.09 X10(3) UL (ref 0–0.7)
EOSINOPHIL NFR BLD AUTO: 1.2 %
ERYTHROCYTE [DISTWIDTH] IN BLOOD BY AUTOMATED COUNT: 12 %
ERYTHROCYTE [SEDIMENTATION RATE] IN BLOOD: 1 MM/HR
ESTRADIOL SERPL-MCNC: 18.5 PG/ML
FASTING PATIENT LIPID ANSWER: YES
FASTING STATUS PATIENT QL REPORTED: YES
FSH SERPL-ACNC: 33.2 MIU/ML
GLOBULIN PLAS-MCNC: 3.6 G/DL (ref 2.8–4.4)
GLUCOSE BLD-MCNC: 104 MG/DL (ref 70–99)
HCT VFR BLD AUTO: 45.3 %
HDLC SERPL-MCNC: 102 MG/DL (ref 40–59)
HGB BLD-MCNC: 15.3 G/DL
IMM GRANULOCYTES # BLD AUTO: 0.02 X10(3) UL (ref 0–1)
IMM GRANULOCYTES NFR BLD: 0.3 %
LDLC SERPL CALC-MCNC: 88 MG/DL (ref ?–100)
LH SERPL-ACNC: 15.3 MIU/ML
LYMPHOCYTES # BLD AUTO: 1.78 X10(3) UL (ref 1–4)
LYMPHOCYTES NFR BLD AUTO: 23.9 %
MCH RBC QN AUTO: 31.6 PG (ref 26–34)
MCHC RBC AUTO-ENTMCNC: 33.8 G/DL (ref 31–37)
MCV RBC AUTO: 93.6 FL
MONOCYTES # BLD AUTO: 0.43 X10(3) UL (ref 0.1–1)
MONOCYTES NFR BLD AUTO: 5.8 %
NEUTROPHILS # BLD AUTO: 5.1 X10 (3) UL (ref 1.5–7.7)
NEUTROPHILS # BLD AUTO: 5.1 X10(3) UL (ref 1.5–7.7)
NEUTROPHILS NFR BLD AUTO: 68.4 %
NONHDLC SERPL-MCNC: 105 MG/DL (ref ?–130)
OSMOLALITY SERPL CALC.SUM OF ELEC: 289 MOSM/KG (ref 275–295)
PLATELET # BLD AUTO: 216 10(3)UL (ref 150–450)
POTASSIUM SERPL-SCNC: 4.2 MMOL/L (ref 3.5–5.1)
PROT SERPL-MCNC: 7.6 G/DL (ref 6.4–8.2)
RBC # BLD AUTO: 4.84 X10(6)UL
RHEUMATOID FACT SERPL-ACNC: <10 IU/ML (ref ?–15)
SODIUM SERPL-SCNC: 139 MMOL/L (ref 136–145)
TRIGL SERPL-MCNC: 99 MG/DL (ref 30–149)
TSI SER-ACNC: 2.05 MIU/ML (ref 0.36–3.74)
VIT B12 SERPL-MCNC: 287 PG/ML (ref 193–986)
VIT D+METAB SERPL-MCNC: 30.4 NG/ML (ref 30–100)
VLDLC SERPL CALC-MCNC: 16 MG/DL (ref 0–30)
WBC # BLD AUTO: 7.5 X10(3) UL (ref 4–11)

## 2023-08-30 PROCEDURE — 80061 LIPID PANEL: CPT

## 2023-08-30 PROCEDURE — 83002 ASSAY OF GONADOTROPIN (LH): CPT

## 2023-08-30 PROCEDURE — 90471 IMMUNIZATION ADMIN: CPT | Performed by: FAMILY MEDICINE

## 2023-08-30 PROCEDURE — 84443 ASSAY THYROID STIM HORMONE: CPT

## 2023-08-30 PROCEDURE — 3008F BODY MASS INDEX DOCD: CPT | Performed by: FAMILY MEDICINE

## 2023-08-30 PROCEDURE — 82306 VITAMIN D 25 HYDROXY: CPT

## 2023-08-30 PROCEDURE — 86431 RHEUMATOID FACTOR QUANT: CPT

## 2023-08-30 PROCEDURE — 36415 COLL VENOUS BLD VENIPUNCTURE: CPT

## 2023-08-30 PROCEDURE — 85652 RBC SED RATE AUTOMATED: CPT

## 2023-08-30 PROCEDURE — 80053 COMPREHEN METABOLIC PANEL: CPT

## 2023-08-30 PROCEDURE — 83001 ASSAY OF GONADOTROPIN (FSH): CPT

## 2023-08-30 PROCEDURE — 90715 TDAP VACCINE 7 YRS/> IM: CPT | Performed by: FAMILY MEDICINE

## 2023-08-30 PROCEDURE — 3074F SYST BP LT 130 MM HG: CPT | Performed by: FAMILY MEDICINE

## 2023-08-30 PROCEDURE — 82670 ASSAY OF TOTAL ESTRADIOL: CPT

## 2023-08-30 PROCEDURE — 99396 PREV VISIT EST AGE 40-64: CPT | Performed by: FAMILY MEDICINE

## 2023-08-30 PROCEDURE — 82607 VITAMIN B-12: CPT

## 2023-08-30 PROCEDURE — 85025 COMPLETE CBC W/AUTO DIFF WBC: CPT

## 2023-08-30 PROCEDURE — 3079F DIAST BP 80-89 MM HG: CPT | Performed by: FAMILY MEDICINE

## 2023-08-30 PROCEDURE — 86140 C-REACTIVE PROTEIN: CPT

## 2023-08-30 PROCEDURE — 86200 CCP ANTIBODY: CPT

## 2023-08-31 LAB — CCP IGG SERPL-ACNC: 1.4 U/ML (ref 0–6.9)

## 2023-09-08 ENCOUNTER — HOSPITAL ENCOUNTER (OUTPATIENT)
Dept: MRI IMAGING | Age: 53
Discharge: HOME | End: 2023-09-08
Attending: PHYSICAL MEDICINE & REHABILITATION
Payer: COMMERCIAL

## 2023-09-08 DIAGNOSIS — M22.2X9 PATELLOFEMORAL PAIN SYNDROME, UNSPECIFIED LATERALITY: ICD-10-CM

## 2023-09-08 DIAGNOSIS — M25.462 EFFUSION OF BURSA OF LEFT KNEE: ICD-10-CM

## 2023-09-08 DIAGNOSIS — M70.61 GREATER TROCHANTERIC BURSITIS OF BOTH HIPS: ICD-10-CM

## 2023-09-08 DIAGNOSIS — M54.59 MECHANICAL LOW BACK PAIN: ICD-10-CM

## 2023-09-08 DIAGNOSIS — M47.816 FACET SYNDROME, LUMBAR: ICD-10-CM

## 2023-09-08 DIAGNOSIS — M48.061 LUMBAR FORAMINAL STENOSIS: ICD-10-CM

## 2023-09-08 DIAGNOSIS — C50.412 MALIGNANT NEOPLASM OF UPPER-OUTER QUADRANT OF LEFT BREAST IN FEMALE, ESTROGEN RECEPTOR POSITIVE: ICD-10-CM

## 2023-09-08 DIAGNOSIS — M54.16 LUMBAR RADICULOPATHY: ICD-10-CM

## 2023-09-08 DIAGNOSIS — M51.37 DDD (DEGENERATIVE DISC DISEASE), LUMBOSACRAL: ICD-10-CM

## 2023-09-08 DIAGNOSIS — M79.10 MYALGIA: ICD-10-CM

## 2023-09-08 DIAGNOSIS — S76.019A STRAIN OF GLUTEUS MEDIUS, UNSPECIFIED LATERALITY, INITIAL ENCOUNTER: ICD-10-CM

## 2023-09-08 DIAGNOSIS — Z17.0 MALIGNANT NEOPLASM OF UPPER-OUTER QUADRANT OF LEFT BREAST IN FEMALE, ESTROGEN RECEPTOR POSITIVE: ICD-10-CM

## 2023-09-08 DIAGNOSIS — M70.62 GREATER TROCHANTERIC BURSITIS OF BOTH HIPS: ICD-10-CM

## 2023-09-08 DIAGNOSIS — M51.36 BULGE OF LUMBAR DISC WITHOUT MYELOPATHY: ICD-10-CM

## 2023-09-08 DIAGNOSIS — M47.816 LUMBAR SPONDYLOSIS: ICD-10-CM

## 2023-09-08 PROCEDURE — 72148 MRI LUMBAR SPINE W/O DYE: CPT | Performed by: PHYSICAL MEDICINE & REHABILITATION

## 2023-09-12 ENCOUNTER — HOSPITAL ENCOUNTER (OUTPATIENT)
Dept: BONE DENSITY | Age: 53
Discharge: HOME | End: 2023-09-12
Attending: FAMILY MEDICINE
Payer: COMMERCIAL

## 2023-09-12 DIAGNOSIS — Z85.3 HISTORY OF INFILTRATING DUCTAL CARCINOMA OF BREAST: ICD-10-CM

## 2023-09-12 DIAGNOSIS — Z78.0 POST-MENOPAUSAL: ICD-10-CM

## 2023-09-12 PROCEDURE — 77080 DXA BONE DENSITY AXIAL: CPT | Performed by: FAMILY MEDICINE

## 2023-09-13 ENCOUNTER — TELEPHONE (OUTPATIENT)
Dept: PHYSICAL THERAPY | Facility: HOSPITAL | Age: 53
End: 2023-09-13

## 2023-09-13 PROCEDURE — 88305 TISSUE EXAM BY PATHOLOGIST: CPT | Performed by: PLASTIC SURGERY

## 2023-09-14 ENCOUNTER — LAB REQUISITION (OUTPATIENT)
Dept: LAB | Facility: HOSPITAL | Age: 53
End: 2023-09-14
Payer: COMMERCIAL

## 2023-09-14 ENCOUNTER — TELEPHONE (OUTPATIENT)
Dept: PHYSICAL THERAPY | Facility: HOSPITAL | Age: 53
End: 2023-09-14

## 2023-09-14 DIAGNOSIS — C44.519 BASAL CELL CARCINOMA OF SKIN OF OTHER PART OF TRUNK: ICD-10-CM

## 2023-09-20 ENCOUNTER — APPOINTMENT (OUTPATIENT)
Dept: PHYSICAL THERAPY | Age: 53
End: 2023-09-20
Attending: PHYSICAL MEDICINE & REHABILITATION
Payer: COMMERCIAL

## 2023-09-22 ENCOUNTER — TELEMEDICINE (OUTPATIENT)
Dept: PHYSICAL MEDICINE AND REHAB | Facility: CLINIC | Age: 53
End: 2023-09-22
Payer: COMMERCIAL

## 2023-09-22 DIAGNOSIS — S76.019A STRAIN OF GLUTEUS MEDIUS, UNSPECIFIED LATERALITY, INITIAL ENCOUNTER: ICD-10-CM

## 2023-09-22 DIAGNOSIS — M47.816 LUMBAR SPONDYLOSIS: ICD-10-CM

## 2023-09-22 DIAGNOSIS — M25.462 EFFUSION OF BURSA OF LEFT KNEE: ICD-10-CM

## 2023-09-22 DIAGNOSIS — M51.37 DDD (DEGENERATIVE DISC DISEASE), LUMBOSACRAL: ICD-10-CM

## 2023-09-22 DIAGNOSIS — M70.61 GREATER TROCHANTERIC BURSITIS OF BOTH HIPS: ICD-10-CM

## 2023-09-22 DIAGNOSIS — M22.2X9 PATELLOFEMORAL PAIN SYNDROME, UNSPECIFIED LATERALITY: ICD-10-CM

## 2023-09-22 DIAGNOSIS — Z17.0 MALIGNANT NEOPLASM OF UPPER-OUTER QUADRANT OF LEFT BREAST IN FEMALE, ESTROGEN RECEPTOR POSITIVE: ICD-10-CM

## 2023-09-22 DIAGNOSIS — M47.816 FACET SYNDROME, LUMBAR: ICD-10-CM

## 2023-09-22 DIAGNOSIS — M70.62 GREATER TROCHANTERIC BURSITIS OF BOTH HIPS: ICD-10-CM

## 2023-09-22 DIAGNOSIS — M79.10 MYALGIA: Primary | ICD-10-CM

## 2023-09-22 DIAGNOSIS — M48.061 LUMBAR FORAMINAL STENOSIS: ICD-10-CM

## 2023-09-22 DIAGNOSIS — M54.59 MECHANICAL LOW BACK PAIN: ICD-10-CM

## 2023-09-22 DIAGNOSIS — M51.36 BULGE OF LUMBAR DISC WITHOUT MYELOPATHY: ICD-10-CM

## 2023-09-22 DIAGNOSIS — M54.16 LUMBAR RADICULOPATHY: ICD-10-CM

## 2023-09-22 DIAGNOSIS — C50.412 MALIGNANT NEOPLASM OF UPPER-OUTER QUADRANT OF LEFT BREAST IN FEMALE, ESTROGEN RECEPTOR POSITIVE: ICD-10-CM

## 2023-09-22 PROCEDURE — 99214 OFFICE O/P EST MOD 30 MIN: CPT | Performed by: PHYSICAL MEDICINE & REHABILITATION

## 2023-09-22 NOTE — PROGRESS NOTES
[address]  Video Visit Progress Note      Telehealth outside of [address] Verbal Consent   I conducted a telehealth visit with Mariia Nurse today, 09/22/23, which was completed using two-way, real-time interactive audio and video communication. This has been done in good lyndon to provide continuity of care in the best interest of the provider-patient relationship, due to the COVID - public health crisis/national emergency where restrictions of face-to-face office visits are ongoing. Every conscious effort was taken to allow for sufficient and adequate time to complete the visit. The patient was made aware of the limitations of the telehealth visit, including treatment limitations as no physical exam could be performed. The patient was advised to call 911 or to go to the ER in case there was an emergency. The patient was also advised of the potential privacy & security concerns related to the telehealth platform. The patient was made aware of where to find [facility] notice of privacy practices, telehealth consent form and other related consent forms and documents. which are located on the [facility] website. The patient verbally agreed to telehealth consent form, related consents and the risks discussed. Lastly, the patient confirmed that they were in [place]. Included in this visit, time may have been spent reviewing labs, medications, radiology tests and decision making. Appropriate medical decision-making and tests are ordered as detailed in the plan of care above. Coding/billing information is submitted for this visit based on complexity of care and/or time spent for the visit. CHIEF COMPLAINT:  Patient presents with:  Low Back Pain  Imaging      History of Present Illness:   The patient is a 52year old  female with a significant history of breast cancer who presents with left lower extremity weakness and discomfort in an L4 radicular pattern with 4 out of 5 strength during left hip flexion, knee extension, dorsiflexion, and great toe extension. She had an MrI of the lumbar spine performed which I reviewed with her today. She had a DEXA scan and is osteopenic. She continues to have significant restrictions with knee flexion on the left especially when trying to cross her legs underneath her body. She has been walking about 5 miles a day.     PAST MEDICAL HISTORY:  Past Medical History:   Diagnosis Date    Back problem     herniated disc    Breast cancer ([address] 12/2021    left breast    Cataract     Colon polyps     Diverticulosis     Esophageal reflux     Gastritis     Hiatal hernia     Hx of motion sickness     Microscopic hematuria 2010    Migraines     menstrual    PONV (postoperative nausea and vomiting)        SURGICAL HISTORY:  Past Surgical History:   Procedure Laterality Date    BREAST RECONSTRUCTION  02/2022    COLONOSCOPY      COLONOSCOPY N/A 6/8/2018    Procedure: COLONOSCOPY;  Surgeon: Maggie Moreau MD;  Location: [address] [place] [address] ENDOSCOPY    COLONOSCOPY N/A 6/27/2023    Procedure: COLONOSCOPY;  Surgeon: Maggie Moreau MD;  Location: [facility] ENDO    CYST ASPIRATION LEFT  09/2016    YE BIOPSY STEREO NODULE 1 SITE LEFT (CPT=19081)  10/19/2021    2 site left calcs top hat and cork    MASTECTOMY LEFT  12/2021    MASTECTOMY RIGHT  12/2021    OTHER      Rhinoplasty    REMOVAL OF OVARIAN CYST(S)  2009    laparascopic right ov cystectomy, paratubal cyst drainage       SOCIAL HISTORY:   Social History    Occupational History      Not on file    Tobacco Use      Smoking status: Never      Smokeless tobacco: Never    Vaping Use      Vaping Use: Never used    Substance and Sexual Activity      Alcohol use: Yes        Comment: 4 drinks per week      Drug use: No      Sexual activity: Not on file      FAMILY HISTORY:   Family History   Problem Relation Age of Onset    Breast Cancer Mother 76    Neurological Disorder Mother         Cerebral Aneurysm    Dementia Mother     Heart Disease Father         CAD    Prostate Cancer Father 80    Diabetes Brother     Schizophrenia Brother     Other (cerebral hemmorage) Brother     Breast Cancer Paternal Aunt 80    No Known Problems Sister     Colon Cancer Paternal Grandmother     Colon Cancer Paternal Cousin Male 62       CURRENT MEDICATIONS:   Current Outpatient Medications   Medication Sig Dispense Refill    tamoxifen 20 MG Oral Tab Take 1 tablet (20 mg total) by mouth daily. 90 tablet 2    Cholecalciferol (VITAMIN D) 50 MCG (2000 UT) Oral Tab Take by mouth. Docusate Sodium (STOOL SOFTENER OR) Take by mouth every other day. Biotin 1000 MCG Oral Tab Take 1,000 mcg by mouth daily. Ascorbic Acid (VITAMIN C) 1000 MG Oral Tab Take 1 tablet (1,000 mg total) by mouth daily. ALLERGIES:     Iodine [Radiology C*    HIVES, RASH  Penicillins             HIVES, RASH    REVIEW OF SYSTEMS:   Review of Systems   Constitutional: Negative. HENT: Negative. Eyes: Negative. Respiratory: Negative. Cardiovascular: Negative. Gastrointestinal: Negative. Genitourinary: Negative. Musculoskeletal: As per HPI  Skin: Negative. Neurological: As per HPI  Endo/Heme/Allergies: Negative. Psychiatric/Behavioral: Negative. All other systems reviewed and are negative. Pertinent positives and negatives noted in the HPI. PHYSICAL EXAM:     There is no height or weight on file to calculate BMI.     General: No immediate distress  Head: Normocephalic/ Atraumatic  Eyes: Extra-occular movements intact  Ears/Nose/Throat:  External appearance identifies normal appearance without obvious deformity  Cardiovascular: No cyanosis, clubbing or edema  Respiratory: Non-labored respirations  Skin: No lesions noted   Neurological: alert & oriented x 3, attentive, able to follow commands, comprehention intact, spontaneous speech intact  Psychiatric: Mood and affect appropriate        Data  Lab Requisition on 09/13/2023 Component Date Value Ref Range Status    Case Report 09/13/2023    Final                    Value:Surgical Pathology                                Case: [id number]                                  Authorizing Provider:  Jeff Li, Chang Peng MD  Collected:           09/13/2023 11:30 AM          Ordering Location:     [facility]          Received:            09/14/2023 07:50 AM                                 Laboratory                                                                   Pathologist:           Kellie Ayala MD                                                        Specimen:    Back, Basal Cell Cancer Left Upper Medial Back Re-excision Suture at 12                    Final Diagnosis: 09/13/2023    Final                    Value: This result contains rich text formatting which cannot be displayed here. Clinical Information 09/13/2023    Final                    Value: This result contains rich text formatting which cannot be displayed here. Gross Description 09/13/2023    Final                    Value: This result contains rich text formatting which cannot be displayed here.     Interpretation 09/13/2023 Abnormal (A)    Final   [facility] Lab Encounter on 08/30/2023   Component Date Value Ref Range Status    FSH 08/30/2023 33.2  No established range for female sex mIU/mL Final    LH 08/30/2023 15.3  No established range for female sex mIU/mL Final    Estradiol 08/30/2023 18.5  No established range for female sex pg/mL Final    Glucose 08/30/2023 104 (H)  70 - 99 mg/dL Final    Sodium 08/30/2023 139  136 - 145 mmol/L Final    Potassium 08/30/2023 4.2  3.5 - 5.1 mmol/L Final    Chloride 08/30/2023 108  98 - 112 mmol/L Final    CO2 08/30/2023 29.0  21.0 - 32.0 mmol/L Final    Anion Gap 08/30/2023 2  0 - 18 mmol/L Final    BUN 08/30/2023 16  7 - 18 mg/dL Final    Creatinine 08/30/2023 0.74  0.55 - 1.02 mg/dL Final    Calcium, Total 08/30/2023 9.4  8.5 - 10.1 mg/dL Final    Calculated Osmolality 08/30/2023 289  275 - 295 mOsm/kg Final    eGFR-Cr 08/30/2023 95  >=60 mL/min/1.73m2 Final    AST 08/30/2023 21  15 - 37 U/L Final    ALT 08/30/2023 32  13 - 56 U/L Final    Alkaline Phosphatase 08/30/2023 59  41 - 108 U/L Final    Bilirubin, Total 08/30/2023 0.5  0.1 - 2.0 mg/dL Final    Total Protein 08/30/2023 7.6  6.4 - 8.2 g/dL Final    Albumin 08/30/2023 4.0  3.4 - 5.0 g/dL Final    Globulin  08/30/2023 3.6  2.8 - 4.4 g/dL Final    A/G Ratio 08/30/2023 1.1  1.0 - 2.0 Final    Patient Fasting for CMP? 08/30/2023 Yes   Final    Cholesterol, Total 08/30/2023 207 (H)  <200 mg/dL Final    HDL Cholesterol 08/30/2023 102 (H)  40 - 59 mg/dL Final    Triglycerides 08/30/2023 99  30 - 149 mg/dL Final    LDL Cholesterol 08/30/2023 88  <100 mg/dL Final    VLDL 08/30/2023 16  0 - 30 mg/dL Final    Non HDL Chol 08/30/2023 105  <130 mg/dL Final    Patient Fasting for Lipid? 08/30/2023 Yes   Final    TSH 08/30/2023 2.050  0.358 - 3.740 mIU/mL Final    Vitamin B12 08/30/2023 287  193 - 986 pg/mL Final    Sed Rate 08/30/2023 1  0 - 30 mm/Hr Final    C-Citrullinated Peptide IgG AB 08/30/2023 1.4  0.0 - 6.9 U/mL Final    Rheumatoid Factor 08/30/2023 <10  <15 IU/mL Final    C-Reactive Protein 08/30/2023 <0.29  <0.30 mg/dL Final    Vitamin D, 25OH, Total 08/30/2023 30.4  30.0 - 100.0 ng/mL Final    WBC 08/30/2023 7.5  4.0 - 11.0 x10(3) uL Final    RBC 08/30/2023 4.84  3.80 - 5.30 x10(6)uL Final    HGB 08/30/2023 15.3  12.0 - 16.0 g/dL Final    HCT 08/30/2023 45.3  35.0 - 48.0 % Final    PLT 08/30/2023 216.0  150.0 - 450.0 10(3)uL Final    MCV 08/30/2023 93.6  80.0 - 100.0 fL Final    MCH 08/30/2023 31.6  26.0 - 34.0 pg Final    MCHC 08/30/2023 33.8  31.0 - 37.0 g/dL Final    RDW 08/30/2023 12.0  % Final    Neutrophil Absolute Prelim 08/30/2023 5.10  1.50 - 7.70 x10 (3) uL Final    Neutrophil Absolute 08/30/2023 5.10  1.50 - 7.70 x10(3) uL Final    Lymphocyte Absolute 08/30/2023 1.78  1.00 - 4.00 x10(3) uL Final    Monocyte Absolute 08/30/2023 0.43  0.10 - 1.00 x10(3) uL Final    Eosinophil Absolute 08/30/2023 0.09  0.00 - 0.70 x10(3) uL Final    Basophil Absolute 08/30/2023 0.03  0.00 - 0.20 x10(3) uL Final    Immature Granulocyte Absolute 08/30/2023 0.02  0.00 - 1.00 x10(3) uL Final    Neutrophil % 08/30/2023 68.4  % Final    Lymphocyte % 08/30/2023 23.9  % Final    Monocyte % 08/30/2023 5.8  % Final    Eosinophil % 08/30/2023 1.2  % Final    Basophil % 08/30/2023 0.4  % Final    Immature Granulocyte % 08/30/2023 0.3  % Final   Admission on 06/27/2023, Discharged on 06/27/2023   Component Date Value Ref Range Status    POCT Urine Pregnancy 06/27/2023 Negative  Negative Final   ]      Radiology Imaging:  I reviewed with the patient her MRI and Dexa Scan of the lumbar spine   XR DEXA BONE DENSITOMETRY (CPT=77080)  Narrative: PROCEDURE: XR DEXA BONE DENSITOMETRY (CPT=77080)     COMPARISON: [facility] DEXA SCAN OSTEOPORSIS MYESHAAL, 5/28/2013, 8:35 AM.     INDICATIONS: Post-menopausal Z85.3 History of infiltrating ductal carcinoma of breast     TECHNIQUE: Measurement of bone mineral density of the lumbar spine and hip was performed on a Hologic dual energy x-ray absorptiometry scanner. FINDINGS:      LEFT FEMORAL NECK   BMD: 0.733 gm/sq. cm. T SCORE: -1.0 Z SCORE: 0.0     LEFT TOTAL HIP   BMD: 0.903 gm/sq. cm. T SCORE: -0.3 Z SCORE: 0.4     PA LUMBAR SPINE (L1 - L4)   BMD: 1.052 gm/sq. cm. T SCORE: 0.0 Z SCORE: 1.1         T scores are a comparison to sex-matched patients with mean peak bone mass and are given in standard deviation (s.d.). Each 1 s.d. corresponds to approximately 10% below peak normal bone density. WORLD [employer] ORGANIZATION CRITERIA  NORMAL T SCORE: Above -1 s.d.    OSTEOPENIA T SCORE: Between -1 and -2.5 s.d.    OSTEOPOROSIS T SCORE: -2.5 s.d.      National Osteoporosis Foundation Clinician's Guide to Prevention and Treatment of Osteoporosis recommendations for treatment:  Post menopausal women and men age 48 and older presenting with the following should be considered for treatment:  * A hip or vertebral (clinical or morphometric) fracture  * T score < -2.5 at the femoral neck or spine after appropriate evaluation to exclude secondary causes. * Low bone mass (T score between -1.0 and -2.5 at the femoral neck or spine) and a 10-year probability of a hip fracture > 3% or a 10-year probability of a major osteoporosis-related fracture > 20% based on the [place]-adapted WHO algorithm              Impression: CONCLUSION:   1. Findings in the left femoral neck suggest minimal osteopenia, there may be increased fracture risk. There has been decrease in the BMD by 5.3% from previous study. Findings in the total left hip are in the normal range, and there is no increased   fracture risk. There has been decrease in the BMD by 1.0% from previous study. The 10 year fracture risk for major osteoporotic fracture is 5.7%, for hip fracture is 0.3%. 2. Findings in the total AP lumbar spine are in the normal range, and there is no increased fracture risk. There has been decrease in the BMD by 2.9% from previous study. Dictated by (CST): Adam Mckeon MD on 9/12/2023 at 4:07 PM       Finalized by (CST): Adam Mckeon MD on 9/12/2023 at 4:08 PM            PROCEDURE: MRI SPINE LUMBAR (CPT=72148)     COMPARISON: [facility], XR LUMBAR SPINE (MIN 4 VIEWS) (CPT=72110), 7/16/2023, 8:57 AM.  [facility], 8/20/2006, 1:26 PM.     INDICATIONS: Z17.0 Malignant neoplasm of upper-outer quadrant of left breast in female, estrogen receptor positive  C50.412 Malignant neoplasm of upper-outer quadrant of le*     TECHNIQUE: A variety of imaging planes and parameters were utilized for visualization of suspected pathology.      FINDINGS:  NUMERATION: For the purposes of this examination, the lowest fully formed disc space is designated as L5-S1.  ALIGNMENT: There is preservation of the expected lumbar lordosis. BONES: No fracture or suspicious osseous lesion is evident. CORD/CAUDA EQUINA: The distal cord and nerve roots have normal caliber, contour, and signal intensity. PARASPINAL AREA: Incidental S2-S3 sacral Tarlov cyst.  OTHER: Negative. LUMBAR DISC LEVELS:  L1-L2: No significant disc/facet abnormality, spinal stenosis, or foraminal stenosis. L2-L3: No significant disc/facet abnormality, spinal stenosis, or foraminal stenosis. L3-L4: No significant disc/facet abnormality, spinal stenosis, or foraminal stenosis. L4-L5: Small diffuse disc bulge with slight ligamentum flavum redundancy and minimal bilateral facet arthrosis dropped a 3 in addition to a tiny left subarticular/foraminal zone disc protrusion/annular fissure. No significant resultant spinal canal,  neural foraminal, or lateral recess compromise. L5-S1: Central/bilateral paracentral disc protrusion with mild bilateral facet arthropathy. Mild bilateral lateral recess stenosis with no substantial spinal canal or neural foraminal compromise. Impression   CONCLUSION:     1. Multilevel degenerative changes of the lumbar spine as detailed. Notable levels as follows:     2. L5-S1:  Broad-based central/bilateral paracentral disc protrusion, which results in mild bilateral lateral recess stenosis. 3. L4-L5:  Disc disease with a small left subarticular/foraminal zone protrusion with associated annular fissure. No significant resultant neural compromise. 4. No suspicious marrow replacing foci throughout the imaged lumbosacral spine to suggest osseous metastatic disease.      5. Lesser incidental findings as above.        elm-remote     Dictated by (CST): Edgar Mckinley MD on 9/08/2023 at 12:34 PM      Finalized by (CST): Edgar Mckinley MD on 9/08/2023 at 12:40 PM           ASSESSMENT AND PLAN:  Jose G Sweeney is a pleasant 68-year-old female presents for follow-up of her left-sided knee pain as well as left leg pain. She does not have any back pain. I have independently reviewed her MRI of the lumbar spine she does have a small annular tear at L4-L5 and a small disc protrusion at L5-S1 leading more towards the left. I am recommending she continue with the plan of physical therapy. She will let us know in about 2 to 3 weeks if she would like to have the left knee aspiration with hyaluronic acid and corticosteroid injection under ultrasound guidance. If she does not want to have the procedure, then she should continue with physical therapy and use Tylenol for pain. I have also discussed with her starting glucosamine with chondroitin. She will run this by her oncologist as well. RTC in 6 to 8 weeks or sooner for knee injection  Discharge Instructions were provided as documented in AVS summary. The patient was in agreement with the assessment and plan. All questions were answered. There were no barriers to learning. We discussed that a telemedicine visit is in place of an office visit; however, this limits the ability to perform a thorough physical examination which may affect objective findings related to a specific condition and can affect treatment. We also discussed that NSAIDs may mask or worsen COVID-19 infection symptoms. The patient was also informed that corticosteroids, in any form, may significantly decrease immune response and may increase risk and complications of infection. The patient was advised that given the current situation with COVID-19, it is in his/her best interest to socially distance his/herself. Given this, we are not recommending any elective procedures or office visits at the outpatient surgery center or in the office respectively unless deemed necessary. My staff will be reaching out to the patient for the elective procedure when it is considered appropriate and the patient can follow-up in office as well when appropriate.   In the meantime, I will be available for telephone and video encounter. Myalgia  (primary encounter diagnosis)  Facet syndrome, lumbar  Mechanical low back pain  Lumbar spondylosis  DDD (degenerative disc disease), lumbosacral  Lumbar foraminal stenosis  Bulge of lumbar disc without myelopathy  Lumbar radiculopathy  Strain of gluteus medius, unspecified laterality, initial encounter  Greater trochanteric bursitis of both hips  Patellofemoral pain syndrome, unspecified laterality  Effusion of bursa of left knee  Malignant neoplasm of upper-outer quadrant of left breast in female, estrogen receptor positive     Shahab Jennings MD  Physical Medicine and Rehabilitation/Sports Medicine  [facility]

## 2023-09-22 NOTE — PATIENT INSTRUCTIONS
1) continue with plan of physical therapy  2) start glucosamine with chondroitin 1500 mg / 1200 mg daily  3) follow-up with me in 6 to 8 weeks or sooner if you would like to have the left knee aspiration/corticosteroid injection

## 2023-09-25 ENCOUNTER — TELEPHONE (OUTPATIENT)
Dept: PHYSICAL MEDICINE AND REHAB | Facility: CLINIC | Age: 53
End: 2023-09-25

## 2023-09-25 NOTE — TELEPHONE ENCOUNTER
Initiated authorization for LEFT knee Monovisc and CSI under ultrasound guidance  procedure.  [facility]  [facility], ,   Faxed Request with clinicals to [phone]  Status Pending auth

## 2023-09-26 NOTE — TELEPHONE ENCOUNTER
Approval from [facility] for LEFT knee Monovisc and CSI under ultrasound guidance  procedure   Case # [id number]  [employer]

## 2023-09-26 NOTE — TELEPHONE ENCOUNTER
Ruben Cotter is requesting additional information for injection. The Clinical information they are requesting is \"Does the patient have osteoarthritis of the knee? \" There is no documentation in 9/22 or 8/29 visit that states she has osteoarthritis of the knee. Nor is there documentation from her xray of the left knee stating she has osteoarthritis of the knee.  Please advise

## 2023-09-27 ENCOUNTER — OFFICE VISIT (OUTPATIENT)
Dept: PHYSICAL THERAPY | Age: 53
End: 2023-09-27
Attending: PHYSICAL MEDICINE & REHABILITATION
Payer: COMMERCIAL

## 2023-09-27 DIAGNOSIS — S76.019A STRAIN OF GLUTEUS MEDIUS, UNSPECIFIED LATERALITY, INITIAL ENCOUNTER: ICD-10-CM

## 2023-09-27 DIAGNOSIS — M51.37 DDD (DEGENERATIVE DISC DISEASE), LUMBOSACRAL: ICD-10-CM

## 2023-09-27 DIAGNOSIS — M48.061 LUMBAR FORAMINAL STENOSIS: ICD-10-CM

## 2023-09-27 DIAGNOSIS — M47.816 LUMBAR SPONDYLOSIS: ICD-10-CM

## 2023-09-27 DIAGNOSIS — M22.2X9 PATELLOFEMORAL PAIN SYNDROME, UNSPECIFIED LATERALITY: ICD-10-CM

## 2023-09-27 DIAGNOSIS — M70.62 GREATER TROCHANTERIC BURSITIS OF BOTH HIPS: ICD-10-CM

## 2023-09-27 DIAGNOSIS — C50.412 MALIGNANT NEOPLASM OF UPPER-OUTER QUADRANT OF LEFT BREAST IN FEMALE, ESTROGEN RECEPTOR POSITIVE: ICD-10-CM

## 2023-09-27 DIAGNOSIS — M54.59 MECHANICAL LOW BACK PAIN: ICD-10-CM

## 2023-09-27 DIAGNOSIS — M70.61 GREATER TROCHANTERIC BURSITIS OF BOTH HIPS: ICD-10-CM

## 2023-09-27 DIAGNOSIS — M47.816 FACET SYNDROME, LUMBAR: ICD-10-CM

## 2023-09-27 DIAGNOSIS — M25.462 EFFUSION OF BURSA OF LEFT KNEE: ICD-10-CM

## 2023-09-27 DIAGNOSIS — Z17.0 MALIGNANT NEOPLASM OF UPPER-OUTER QUADRANT OF LEFT BREAST IN FEMALE, ESTROGEN RECEPTOR POSITIVE: ICD-10-CM

## 2023-09-27 DIAGNOSIS — M79.10 MYALGIA: Primary | ICD-10-CM

## 2023-09-27 DIAGNOSIS — M51.36 BULGE OF LUMBAR DISC WITHOUT MYELOPATHY: ICD-10-CM

## 2023-09-27 DIAGNOSIS — M54.16 LUMBAR RADICULOPATHY: ICD-10-CM

## 2023-09-27 PROCEDURE — 97161 PT EVAL LOW COMPLEX 20 MIN: CPT | Performed by: PHYSICAL THERAPIST

## 2023-09-27 PROCEDURE — 97110 THERAPEUTIC EXERCISES: CPT | Performed by: PHYSICAL THERAPIST

## 2023-10-04 ENCOUNTER — APPOINTMENT (OUTPATIENT)
Dept: PHYSICAL THERAPY | Age: 53
End: 2023-10-04
Attending: PHYSICAL MEDICINE & REHABILITATION
Payer: COMMERCIAL

## 2023-10-06 ENCOUNTER — OFFICE VISIT (OUTPATIENT)
Dept: PHYSICAL THERAPY | Age: 53
End: 2023-10-06
Attending: PHYSICAL MEDICINE & REHABILITATION
Payer: COMMERCIAL

## 2023-10-06 PROCEDURE — 97110 THERAPEUTIC EXERCISES: CPT | Performed by: PHYSICAL THERAPIST

## 2023-10-06 NOTE — PROGRESS NOTES
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)  Mechanical low back pain (M54.59)  Lumbar spondylosis (M47.816)  DDD (degenerative disc disease), lumbosacral (M51.37)  Lumbar foraminal stenosis (M48.061)  Bulge of lumbar disc without myelopathy (M51.36)  Lumbar radiculopathy (M54.16)  Strain of gluteus medius, unspecified laterality, initial encounter (S76.019A)  Greater trochanteric bursitis of both hips (M70.61,M70.62)  Patellofemoral pain syndrome, unspecified laterality (M22.2X9)  Effusion of bursa of left knee (M25.462)        Next MD visit: none scheduled  Fall Risk: standard         Precautions: n/a          Medication Changes since last visit?: No      Subjective: Patient complains of L knee and thigh soreness. Pt describes pain level 1/10. Objective:  L knee ROM 0-120 compared to R knee 0-130  Trunk ROM is minimally limited into extension and WFL into all other planes. Gait: Does not present with any major gait deviations on flat surfaces. Assessment: Demonstrates improved LE mobility after therapeutic exercises. Decreased L LE strength compared to R LE. Patient and PT goals are in progress. Plan: Continue PT to improve mobility. 10/6/2023  Visit#: 2/ AHSAN INS    Thera Ex   X45min  - prone press ups 5 x 2  - prone knee flexion AROM 10x  - supine L LE dural stretch  - calf stretch on slant board 30 sec x 3  - B heel raise on slant board 10 x 2  - L hip and knee flexion on 14 inch step 10 x 2  - Shuttle B LE extensions 4 bands 10 x 3  - Shuttle single LE extensions 3 bands 10 x 3  - LE abduction with red theraband 10 x 2  - forward step up on BOSU 10 x 2  - side step up on BOSU 10 x 2  - instructed on additional HEP.  Handouts were created and issued to patient  - Elliptical x 10min with cueing for consistency of step                       Charges: EX3       Total Timed Treatment: 45 min  Total Treatment Time: 45 min

## 2023-10-09 ENCOUNTER — OFFICE VISIT (OUTPATIENT)
Dept: PHYSICAL THERAPY | Age: 53
End: 2023-10-09
Attending: PHYSICAL MEDICINE & REHABILITATION
Payer: COMMERCIAL

## 2023-10-09 PROCEDURE — 97110 THERAPEUTIC EXERCISES: CPT | Performed by: PHYSICAL THERAPIST

## 2023-10-09 NOTE — PROGRESS NOTES
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)  Mechanical low back pain (M54.59)  Lumbar spondylosis (M47.816)  DDD (degenerative disc disease), lumbosacral (M51.37)  Lumbar foraminal stenosis (M48.061)  Bulge of lumbar disc without myelopathy (M51.36)  Lumbar radiculopathy (M54.16)  Strain of gluteus medius, unspecified laterality, initial encounter (S76.019A)  Greater trochanteric bursitis of both hips (M70.61,M70.62)  Patellofemoral pain syndrome, unspecified laterality (M22.2X9)  Effusion of bursa of left knee (M25.462)        Next MD visit: none scheduled  Fall Risk: standard         Precautions: n/a          Medication Changes since last visit?: No      Subjective: Patient complains of L knee and thigh soreness. State she did   Pt describes pain level 1/10. Objective:  L knee ROM 0-120 compared to R knee 0-130  (+) swelling on L knee  Gait: Does not present with any major gait deviations on flat surfaces. Assessment: Demonstrates improved LE mobility after therapeutic exercises. Noted some swelling on L knee with decreased terminal knee extension. Patient and PT goals are in progress. Plan: Continue PT to improve mobility.      10/9/2023  Visit#: 3/ AHSAN JEFFERS 10/6/2023  Visit#: 2/ AHSAN INS    Thera Ex   X45min  - standing trunk extensions 10x  - prone knee flexion with towel roll AROM 10x  - seated L LE dural stretch 10x  - calf stretch on slant board 30 sec x 3  - B heel raise on slant board 10 x 2  - L hip and knee flexion on 14 inch step 10 x 2  - Shuttle B LE extensions 5 bands 10 x 3  - Shuttle single LE extensions 3 bands 10 x 3  - sitting L lower LE closed chain rotation  - seated L LE extension, supine quad sets with PT overpressure   - closed chain terminal L knee extension with 5# cable resistance 10 x 2  - LE abduction with red theraband 10 x 2  - forward step up on BOSU 10 x 2  - side step up on BOSU 10 x 2  - Elliptical x 10min with cueing for consistency of step X45min  - prone press ups 5 x 2  - prone knee flexion AROM 10x  - supine L LE dural stretch  - calf stretch on slant board 30 sec x 3  - B heel raise on slant board 10 x 2  - L hip and knee flexion on 14 inch step 10 x 2  - Shuttle B LE extensions 4 bands 10 x 3  - Shuttle single LE extensions 3 bands 10 x 3  - LE abduction with red theraband 10 x 2  - forward step up on BOSU 10 x 2  - side step up on BOSU 10 x 2  - instructed on additional HEP.  Handouts were created and issued to patient  - Elliptical x 10min with cueing for consistency of step                         Charges: EX3       Total Timed Treatment: 45 min  Total Treatment Time: 45 min

## 2023-10-12 ENCOUNTER — OFFICE VISIT (OUTPATIENT)
Dept: PHYSICAL THERAPY | Age: 53
End: 2023-10-12
Attending: PHYSICAL MEDICINE & REHABILITATION
Payer: COMMERCIAL

## 2023-10-12 ENCOUNTER — OFFICE VISIT (OUTPATIENT)
Dept: HEMATOLOGY/ONCOLOGY | Facility: HOSPITAL | Age: 53
End: 2023-10-12
Attending: INTERNAL MEDICINE
Payer: COMMERCIAL

## 2023-10-12 VITALS
BODY MASS INDEX: 23.25 KG/M2 | RESPIRATION RATE: 16 BRPM | WEIGHT: 136.19 LBS | OXYGEN SATURATION: 98 % | HEIGHT: 64 IN | SYSTOLIC BLOOD PRESSURE: 100 MMHG | HEART RATE: 67 BPM | DIASTOLIC BLOOD PRESSURE: 78 MMHG | TEMPERATURE: 98 F

## 2023-10-12 DIAGNOSIS — Z85.3 ENCOUNTER FOR FOLLOW-UP SURVEILLANCE OF BREAST CANCER: ICD-10-CM

## 2023-10-12 DIAGNOSIS — Z08 ENCOUNTER FOR FOLLOW-UP SURVEILLANCE OF BREAST CANCER: ICD-10-CM

## 2023-10-12 DIAGNOSIS — Z51.81 ENCOUNTER FOR MONITORING TAMOXIFEN THERAPY: ICD-10-CM

## 2023-10-12 DIAGNOSIS — C50.412 MALIGNANT NEOPLASM OF UPPER-OUTER QUADRANT OF LEFT BREAST IN FEMALE, ESTROGEN RECEPTOR POSITIVE: Primary | ICD-10-CM

## 2023-10-12 DIAGNOSIS — Z17.0 MALIGNANT NEOPLASM OF UPPER-OUTER QUADRANT OF LEFT BREAST IN FEMALE, ESTROGEN RECEPTOR POSITIVE: Primary | ICD-10-CM

## 2023-10-12 DIAGNOSIS — Z79.810 ENCOUNTER FOR MONITORING TAMOXIFEN THERAPY: ICD-10-CM

## 2023-10-12 PROCEDURE — 99214 OFFICE O/P EST MOD 30 MIN: CPT | Performed by: INTERNAL MEDICINE

## 2023-10-12 PROCEDURE — 97110 THERAPEUTIC EXERCISES: CPT | Performed by: PHYSICAL THERAPIST

## 2023-10-12 RX ORDER — LETROZOLE 2.5 MG/1
2.5 TABLET, FILM COATED ORAL DAILY
Qty: 30 TABLET | Refills: 6 | Status: SHIPPED | OUTPATIENT
Start: 2023-10-12

## 2023-10-12 NOTE — PROGRESS NOTES
Progress Summary  Pt has attended 4 visits in Physical Therapy. 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)  Mechanical low back pain (M54.59)  Lumbar spondylosis (M47.816)  DDD (degenerative disc disease), lumbosacral (M51.37)  Lumbar foraminal stenosis (M48.061)  Bulge of lumbar disc without myelopathy (M51.36)  Lumbar radiculopathy (M54.16)  Strain of gluteus medius, unspecified laterality, initial encounter (S76.019A)  Greater trochanteric bursitis of both hips (M70.61,M70.62)  Patellofemoral pain syndrome, unspecified laterality (M22.2X9)  Effusion of bursa of left knee (M25.462)        Next MD visit: none scheduled  Fall Risk: standard         Precautions: n/a          Medication Changes since last visit?: No      Subjective: Patient complains of L knee and thigh soreness. State she does not feel much improvement at this time  Pt describes pain level 1/10. Objective:  L knee ROM 0-120 compared to R knee 0-130  (+) swelling on L knee  Girth measurement around medial knee joint line: R 39cm, L 40.5cm  Gait: Does not present with any major gait deviations on flat surfaces. Assessment: Referred to PT due to initial complaints of L knee pain with decreased strength. Patient has completed 4 PT visits and reports no major improvements. L knee ROM is WFL 0-130. Noted swelling on L LE compared to R LE. L LE strength grossly 4/5 into LE extension compared to R LE. Patient walks independently without a device but is still limited with distance due to increase L knee pain after prolonged walking activities. Patient is independent with her home exercises and its progression.      10/12/2023  Visit#: 4/ [facility] 10/9/2023  Visit#: 3/ [facility] 10/6/2023  Visit#: 2/ [facility]    Thera Ex   X45min  - supine quad set 10 x 5 sec hold  - prone knee flexion with towel roll AROM 10x  - calf stretch on slant board 30 sec x 3  - single LE heel raise on slant board 10 x 2  - Shuttle B LE extensions 5 bands 10 x 3  - Shuttle single LE extensions 3 bands 10 x 3  - seated L LE hamstring curls with 10# cable resistance 10 x 3  - LE abduction, extension with 5# cable resistance 10 x 2  - closed chain terminal L knee extension with 5# cable resistance 10 x 2  - forward step up on BOSU 10 x 2  - side step up on BOSU 10 x 2  - Elliptical x 10min with cueing for consistency of step   X45min  - standing trunk extensions 10x  - prone knee flexion with towel roll AROM 10x  - seated L LE dural stretch 10x  - calf stretch on slant board 30 sec x 3  - B heel raise on slant board 10 x 2  - L hip and knee flexion on 14 inch step 10 x 2  - Shuttle B LE extensions 5 bands 10 x 3  - Shuttle single LE extensions 3 bands 10 x 3  - sitting L lower LE closed chain rotation  - seated L LE extension, supine quad sets with PT overpressure   - closed chain terminal L knee extension with 5# cable resistance 10 x 2  - LE abduction with red theraband 10 x 2  - forward step up on BOSU 10 x 2  - side step up on BOSU 10 x 2  - Elliptical x 10min with cueing for consistency of step   X45min  - prone press ups 5 x 2  - prone knee flexion AROM 10x  - supine L LE dural stretch  - calf stretch on slant board 30 sec x 3  - B heel raise on slant board 10 x 2  - L hip and knee flexion on 14 inch step 10 x 2  - Shuttle B LE extensions 4 bands 10 x 3  - Shuttle single LE extensions 3 bands 10 x 3  - LE abduction with red theraband 10 x 2  - forward step up on BOSU 10 x 2  - side step up on BOSU 10 x 2  - instructed on additional HEP. Handouts were created and issued to patient  - Elliptical x 10min with cueing for consistency of step                         Charges: EX3       Total Timed Treatment: 45 min  Total Treatment Time: 45 min  LEFS Score  LEFS Score: 62.5 % (9/19/2023  9:52 AM)    Post LEFS Score  Post LEFS Score: 62.5 % (10/12/2023  2:12 PM)    0 % improvement    Plan: Continue skilled Physical Therapy and home exercises.  Patient to follow up with MD next week. Patient/Family/Caregiver was advised of these findings, precautions, and treatment options and has agreed to actively participate in planning and for this course of care. Thank you for your referral. If you have any questions, please contact me at Dept: [phone].     Sincerely,  Electronically signed by therapist: Evans Shaw, PT     Certification From: 45/70/6017  To:1/10/2024

## 2023-10-16 ENCOUNTER — APPOINTMENT (OUTPATIENT)
Dept: PHYSICAL THERAPY | Age: 53
End: 2023-10-16
Attending: PHYSICAL MEDICINE & REHABILITATION
Payer: COMMERCIAL

## 2023-10-17 ENCOUNTER — OFFICE VISIT (OUTPATIENT)
Dept: PHYSICAL MEDICINE AND REHAB | Facility: CLINIC | Age: 53
End: 2023-10-17
Payer: COMMERCIAL

## 2023-10-17 VITALS
HEART RATE: 73 BPM | HEIGHT: 64 IN | DIASTOLIC BLOOD PRESSURE: 72 MMHG | SYSTOLIC BLOOD PRESSURE: 108 MMHG | BODY MASS INDEX: 23.22 KG/M2 | WEIGHT: 136 LBS | OXYGEN SATURATION: 99 %

## 2023-10-17 DIAGNOSIS — S76.019A STRAIN OF GLUTEUS MEDIUS, UNSPECIFIED LATERALITY, INITIAL ENCOUNTER: ICD-10-CM

## 2023-10-17 DIAGNOSIS — M70.61 GREATER TROCHANTERIC BURSITIS OF BOTH HIPS: ICD-10-CM

## 2023-10-17 DIAGNOSIS — M54.16 LUMBAR RADICULOPATHY: ICD-10-CM

## 2023-10-17 DIAGNOSIS — M17.10 PRIMARY OSTEOARTHRITIS OF KNEE, UNSPECIFIED LATERALITY: ICD-10-CM

## 2023-10-17 DIAGNOSIS — C50.412 MALIGNANT NEOPLASM OF UPPER-OUTER QUADRANT OF LEFT BREAST IN FEMALE, ESTROGEN RECEPTOR POSITIVE: ICD-10-CM

## 2023-10-17 DIAGNOSIS — M22.2X9 PATELLOFEMORAL PAIN SYNDROME, UNSPECIFIED LATERALITY: ICD-10-CM

## 2023-10-17 DIAGNOSIS — M47.816 LUMBAR SPONDYLOSIS: ICD-10-CM

## 2023-10-17 DIAGNOSIS — M51.37 DDD (DEGENERATIVE DISC DISEASE), LUMBOSACRAL: ICD-10-CM

## 2023-10-17 DIAGNOSIS — M25.462 EFFUSION OF BURSA OF LEFT KNEE: ICD-10-CM

## 2023-10-17 DIAGNOSIS — M79.10 MYALGIA: Primary | ICD-10-CM

## 2023-10-17 DIAGNOSIS — Z17.0 MALIGNANT NEOPLASM OF UPPER-OUTER QUADRANT OF LEFT BREAST IN FEMALE, ESTROGEN RECEPTOR POSITIVE: ICD-10-CM

## 2023-10-17 DIAGNOSIS — M48.061 LUMBAR FORAMINAL STENOSIS: ICD-10-CM

## 2023-10-17 DIAGNOSIS — M54.59 MECHANICAL LOW BACK PAIN: ICD-10-CM

## 2023-10-17 DIAGNOSIS — M70.62 GREATER TROCHANTERIC BURSITIS OF BOTH HIPS: ICD-10-CM

## 2023-10-17 DIAGNOSIS — M51.36 BULGE OF LUMBAR DISC WITHOUT MYELOPATHY: ICD-10-CM

## 2023-10-17 PROBLEM — N64.4 BREAST PAIN: Status: ACTIVE | Noted: 2018-07-30

## 2023-10-17 PROBLEM — R10.2 ADNEXAL TENDERNESS, RIGHT: Status: ACTIVE | Noted: 2017-05-18

## 2023-10-17 PROBLEM — N60.19 FIBROCYSTIC BREAST DISEASE (FCBD): Status: ACTIVE | Noted: 2018-07-30

## 2023-10-17 PROCEDURE — 3074F SYST BP LT 130 MM HG: CPT | Performed by: PHYSICAL MEDICINE & REHABILITATION

## 2023-10-17 PROCEDURE — 3008F BODY MASS INDEX DOCD: CPT | Performed by: PHYSICAL MEDICINE & REHABILITATION

## 2023-10-17 PROCEDURE — 3078F DIAST BP <80 MM HG: CPT | Performed by: PHYSICAL MEDICINE & REHABILITATION

## 2023-10-17 PROCEDURE — 99214 OFFICE O/P EST MOD 30 MIN: CPT | Performed by: PHYSICAL MEDICINE & REHABILITATION

## 2023-10-17 NOTE — PATIENT INSTRUCTIONS
1) Lets continue plan for left knee aspiration and HA/CSI (?) in November. 2) Continue with therapy  3) Tylenol 500-1000 mg every 6-8 hours as needed for pain. No more than 3000 mg daily.

## 2023-10-19 ENCOUNTER — OFFICE VISIT (OUTPATIENT)
Dept: PHYSICAL THERAPY | Age: 53
End: 2023-10-19
Attending: PHYSICAL MEDICINE & REHABILITATION
Payer: COMMERCIAL

## 2023-10-19 ENCOUNTER — OFFICE VISIT (OUTPATIENT)
Dept: OBGYN CLINIC | Facility: CLINIC | Age: 53
End: 2023-10-19
Payer: COMMERCIAL

## 2023-10-19 VITALS
WEIGHT: 137.38 LBS | DIASTOLIC BLOOD PRESSURE: 77 MMHG | BODY MASS INDEX: 24 KG/M2 | HEART RATE: 73 BPM | SYSTOLIC BLOOD PRESSURE: 125 MMHG

## 2023-10-19 DIAGNOSIS — Z85.3 PERSONAL HISTORY OF BREAST CANCER: ICD-10-CM

## 2023-10-19 DIAGNOSIS — Z01.419 ENCOUNTER FOR GYNECOLOGICAL EXAMINATION WITHOUT ABNORMAL FINDING: Primary | ICD-10-CM

## 2023-10-19 DIAGNOSIS — Z12.4 SCREENING FOR CERVICAL CANCER: ICD-10-CM

## 2023-10-19 PROCEDURE — 97110 THERAPEUTIC EXERCISES: CPT | Performed by: PHYSICAL THERAPIST

## 2023-10-19 PROCEDURE — 3074F SYST BP LT 130 MM HG: CPT | Performed by: OBSTETRICS & GYNECOLOGY

## 2023-10-19 PROCEDURE — 99396 PREV VISIT EST AGE 40-64: CPT | Performed by: OBSTETRICS & GYNECOLOGY

## 2023-10-19 PROCEDURE — 3078F DIAST BP <80 MM HG: CPT | Performed by: OBSTETRICS & GYNECOLOGY

## 2023-10-19 NOTE — PROGRESS NOTES
Progress Summary  Pt has attended 4 visits in Physical Therapy. 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)  Mechanical low back pain (M54.59)  Lumbar spondylosis (M47.816)  DDD (degenerative disc disease), lumbosacral (M51.37)  Lumbar foraminal stenosis (M48.061)  Bulge of lumbar disc without myelopathy (M51.36)  Lumbar radiculopathy (M54.16)  Strain of gluteus medius, unspecified laterality, initial encounter (S76.019A)  Greater trochanteric bursitis of both hips (M70.61,M70.62)  Patellofemoral pain syndrome, unspecified laterality (M22.2X9)  Effusion of bursa of left knee (M25.462)        Next MD visit: none scheduled  Fall Risk: standard         Precautions: n/a          Medication Changes since last visit?: No      Subjective: Patient complains of L knee and thigh soreness. State she does not feel much improvement at this time  Pt describes pain level 1/10. Objective:  L knee ROM 0-120 compared to R knee 0-130  (+) swelling on L knee  Girth measurement around medial knee joint line: R 39cm, L 40.5cm  Gait: Does not present with any major gait deviations on flat surfaces. Assessment: Referred to PT due to initial complaints of L knee pain with decreased strength. Patient has completed 5 PT visits and reports her knee is feeling better overall. L knee ROM is WFL 0-130. Still with swelling on L LE compared to R LE. L LE strength grossly 4/5 into LE extension compared to R LE. Patient is independent with her home exercises and its progression.      10/19/2023  Visit#: 5/ [facility] 10/12/2023  Visit#: 4/ [facility] 10/9/2023  Visit#: 3/ [facility] 10/6/2023  Visit#: 2/ [facility]    Thera Ex   X55min  - supine quad set 10 x 5 sec hold  - supine SLR 10x 5 sec hold  - prone knee flexion with towel roll under thigh AROM  - seated LE extensions 10 x 5 sec hold  - calf stretch on slant board 30 sec x 3  - B heel raises 10 x 2  - Shuttle B LE extensions 5 bands 10 x 3  - Shuttle single LE extensions 3 bands 10 x 3  - closed chain terminal L knee extension with 5# cable resistance 10 x 2  - seated L LE hamstring curls with 10# 10 x 2  - forward step up on BOSU 10 x 2  - Elliptical x 10min with cueing for consistency of step   X45min  - supine quad set 10 x 5 sec hold  - prone knee flexion with towel roll AROM 10x  - calf stretch on slant board 30 sec x 3  - single LE heel raise on slant board 10 x 2  - Shuttle B LE extensions 5 bands 10 x 3  - Shuttle single LE extensions 3 bands 10 x 3  - seated L LE hamstring curls with 10# cable resistance 10 x 3  - LE abduction, extension with 5# cable resistance 10 x 2  - closed chain terminal L knee extension with 5# cable resistance 10 x 2  - forward step up on BOSU 10 x 2  - side step up on BOSU 10 x 2  - Elliptical x 10min with cueing for consistency of step   X45min  - standing trunk extensions 10x  - prone knee flexion with towel roll AROM 10x  - seated L LE dural stretch 10x  - calf stretch on slant board 30 sec x 3  - B heel raise on slant board 10 x 2  - L hip and knee flexion on 14 inch step 10 x 2  - Shuttle B LE extensions 5 bands 10 x 3  - Shuttle single LE extensions 3 bands 10 x 3  - sitting L lower LE closed chain rotation  - seated L LE extension, supine quad sets with PT overpressure   - closed chain terminal L knee extension with 5# cable resistance 10 x 2  - LE abduction with red theraband 10 x 2  - forward step up on BOSU 10 x 2  - side step up on BOSU 10 x 2  - Elliptical x 10min with cueing for consistency of step   X45min  - prone press ups 5 x 2  - prone knee flexion AROM 10x  - supine L LE dural stretch  - calf stretch on slant board 30 sec x 3  - B heel raise on slant board 10 x 2  - L hip and knee flexion on 14 inch step 10 x 2  - Shuttle B LE extensions 4 bands 10 x 3  - Shuttle single LE extensions 3 bands 10 x 3  - LE abduction with red theraband 10 x 2  - forward step up on BOSU 10 x 2  - side step up on BOSU 10 x 2  - instructed on additional HEP. Handouts were created and issued to patient  - Elliptical x 10min with cueing for consistency of step                           Charges: EX4      Total Timed Treatment: 55 min  Total Treatment Time: 55 min      Plan: Patient will continue with her home exercises until her next MD follow up on 11/13. Then follow up with PT to continue with therapeutic exercises.

## 2023-10-19 NOTE — PROGRESS NOTES
Judie Delgado is a 52year old female  No LMP recorded (approximate). Patient presents with:  Gyn Exam: Annual -- switched from tamoxifen to femara. No period x one year  . OBSTETRICS HISTORY:     OB History    Para Term  AB Living   0 0 0 0 0 0   SAB IAB Ectopic Multiple Live Births   0 0 0 0 0       GYNE HISTORY:     Periods none due to menopause      None    Sexual activity:   Yes        Hx Prior Abnormal Pap: No  Pap Date: 20  Pap Result Notes: Pap neg, HPV Neg //// DEXA 23 1. Findings in the left femoral neck suggest minimal osteopenia. Findings in the total left hip are in the normal range, and there is no increased. Findings in the total AP lumbar spine are in the normal range. //// ANNUAL 10-6-22 [facility].   Follow Up Recommendation: MAMMO BILATERAL  10/6/21 MALIGNANCY          Latest Ref Rng & Units 2020     9:20 AM   RECENT PAP RESULTS   Thinprep Pap Negative for intraepithelial lesion or malignancy Negative for intraepithelial lesion or malignancy    HPV Negative Negative          MEDICAL HISTORY:     Past Medical History:   Diagnosis Date    Back problem     herniated disc    Breast cancer ([address] 2021    left breast    Cataract     Colon polyps     Diverticulosis     Esophageal reflux     Gastritis     Hiatal hernia     Hx of motion sickness     Microscopic hematuria     Migraines     menstrual    PONV (postoperative nausea and vomiting)      Past Surgical History:   Procedure Laterality Date    BREAST RECONSTRUCTION  2022    COLONOSCOPY      COLONOSCOPY N/A 2018    Procedure: COLONOSCOPY;  Surgeon: Lucila Haley MD;  Location: [address] [place] [address] ENDOSCOPY    COLONOSCOPY N/A 2023    Procedure: COLONOSCOPY;  Surgeon: Lucila Haley MD;  Location: [facility] ENDO    CYST ASPIRATION LEFT  2016    YE BIOPSY STEREO NODULE 1 SITE LEFT (CPT=19081)  10/19/2021    2 site left calcs top hat and cork    MASTECTOMY LEFT  2021    MASTECTOMY RIGHT  2021    OTHER Rhinoplasty    REMOVAL OF OVARIAN CYST(S)      laparascopic right ov cystectomy, paratubal cyst drainage     OB History     T0    L0    SAB0  IAB0  Ectopic0  Multiple0  Live Births0      SOCIAL HISTORY:     Tobacco Use: Low Risk  (10/19/2023)      Patient History          Smoking Tobacco Use: Never          Smokeless Tobacco Use: Never          Passive Exposure: Not on file    FAMILY HISTORY:     Family History   Problem Relation Age of Onset    Breast Cancer Mother 76    Neurological Disorder Mother         Cerebral Aneurysm    Dementia Mother         Due to brain aneurysm 21 years prior    Heart Disease Father         CAD    Prostate Cancer Father 80    Diabetes Brother     Schizophrenia Brother     Other (cerebral hemmorage) Brother     Breast Cancer Paternal Aunt 80    No Known Problems Sister     Colon Cancer Paternal Grandmother     Colon Cancer Paternal Cousin Male 62         MEDICATIONS:       Current Outpatient Medications:     letrozole 2.5 MG Oral Tab, Take 1 tablet (2.5 mg total) by mouth daily. , Disp: 30 tablet, Rfl: 6    Cholecalciferol (VITAMIN D) 50 MCG (2000 UT) Oral Tab, Take by mouth., Disp: , Rfl:     Docusate Sodium (STOOL SOFTENER OR), Take by mouth every other day., Disp: , Rfl:     Biotin 1000 MCG Oral Tab, Take 1,000 mcg by mouth daily. , Disp: , Rfl:     Ascorbic Acid (VITAMIN C) 1000 MG Oral Tab, Take 1 tablet (1,000 mg total) by mouth daily. , Disp: , Rfl:     ALLERGIES:       Iodine [Radiology C*    HIVES, RASH  Penicillins             HIVES, RASH      REVIEW OF SYSTEMS:     Constitutional:    denies fever / chills  Eyes:     denies blurred or double vision  Cardiovascular:  denies chest pain or palpitations  Respiratory:    denies shortness of breath  Gastrointestinal:  denies severe abdominal pain, frequent diarrhea or constipation, nausea / vomiting  Genitourinary:    denies dysuria, bothersome incontinence  Skin/Breast:   denies any breast pain, lumps, or discharge  Neurological:    denies frequent severe headaches  Psychiatric:   denies depression or anxiety, thoughts of harming self or others  Heme/Lymph:    denies easy bruising or bleeding      PHYSICAL EXAM:   Blood pressure 125/77, pulse 73, weight 137 lb 6.4 oz (62.3 kg). Constitutional:  well developed, well nourished  Head/Face:  normocephalic  Neck/Thyroid: thyroid symmetric, no thyromegaly, no nodules, no adenopathy  Lymphatic: no abnormal supraclavicular or axillary adenopathy is noted  Breast:   (+) bilateral mastectomy w/ implants -- no masses noted  Abdomen:   soft, nontender, nondistended, no masses  Skin/Hair:  no unusual rashes or bruises  Extremities:  no edema, no cyanosis  Psychiatric:   oriented to time, place, person and situation. Appropriate mood and affect    Pelvic Exam:  External Genitalia:  normal appearance, hair distribution, and no lesions  Urethral Meatus:   normal in size, location, without lesions and prolapse  Bladder:    no fullness, masses or tenderness  Vagina:    normal appearance without lesions, no abnormal discharge  Cervix:    normal without tenderness on motion  Uterus:    normal in size, contour, position, mobility, without tenderness  Adnexa:   normal without masses or tenderness  Perineum:   normal  Anus: no hemorroids         ASSESSMENT & PLAN:     Shayy David was seen today for gyn exam.    Diagnoses and all orders for this visit:    Encounter for gynecological examination without abnormal finding    Personal history of breast cancer        SUMMARY:  Pap: Next cotest today per ASCCP guidelines.   BCM:  None  STD screening: declines  Mammogram: No  HM updated  Depression screen:   Depression Screening (PHQ-2/PHQ-9): Over the LAST 2 WEEKS   Little interest or pleasure in doing things (over the last two weeks)?: Not at all    Feeling down, depressed, or hopeless (over the last two weeks)?: Not at all    PHQ-2 SCORE: 0          FOLLOW-UP     Return in about 1 year (around 10/19/2024) for annual gyne exam.    Note to patient and family:  The [facility] makes medical notes available to patients in the interest of transparency. However, please be advised that this is a medical document. It is intended as a peer to peer communication. It is written in medical language and may contain abbreviations or verbiage that are technical and unfamiliar. It may appear blunt or direct. Medical documents are intended to carry relevant information, facts as evident, and the clinical opinion of the practitioner.

## 2023-10-20 LAB — HPV I/H RISK 1 DNA SPEC QL NAA+PROBE: NEGATIVE

## 2023-10-26 ENCOUNTER — APPOINTMENT (OUTPATIENT)
Dept: PHYSICAL THERAPY | Age: 53
End: 2023-10-26
Attending: PHYSICAL MEDICINE & REHABILITATION
Payer: COMMERCIAL

## 2023-11-09 ENCOUNTER — TELEPHONE (OUTPATIENT)
Dept: PHYSICAL MEDICINE AND REHAB | Facility: CLINIC | Age: 53
End: 2023-11-09

## 2023-11-09 NOTE — TELEPHONE ENCOUNTER
Pt called and asked for an MRI of both knees as she has new knee pain on her right knee as well and her swelling on left knee is down. Pt states she doesn't want the injection but is open to discuss with Dr Anil Rojas. Educated patient that since the pain on her right knee is new is best she is evaluated in office first to determine what is the next step for her. Pt requested to change the appointment of the injection as a follow up appointment at this time.

## 2023-11-10 ENCOUNTER — OFFICE VISIT (OUTPATIENT)
Dept: HEMATOLOGY/ONCOLOGY | Facility: HOSPITAL | Age: 53
End: 2023-11-10
Attending: INTERNAL MEDICINE
Payer: COMMERCIAL

## 2023-11-10 VITALS
WEIGHT: 138 LBS | HEART RATE: 85 BPM | RESPIRATION RATE: 16 BRPM | OXYGEN SATURATION: 97 % | DIASTOLIC BLOOD PRESSURE: 71 MMHG | SYSTOLIC BLOOD PRESSURE: 114 MMHG | TEMPERATURE: 98 F | HEIGHT: 64 IN | BODY MASS INDEX: 23.56 KG/M2

## 2023-11-10 DIAGNOSIS — C50.412 MALIGNANT NEOPLASM OF UPPER-OUTER QUADRANT OF LEFT BREAST IN FEMALE, ESTROGEN RECEPTOR POSITIVE: Primary | ICD-10-CM

## 2023-11-10 DIAGNOSIS — Z17.0 MALIGNANT NEOPLASM OF UPPER-OUTER QUADRANT OF LEFT BREAST IN FEMALE, ESTROGEN RECEPTOR POSITIVE: Primary | ICD-10-CM

## 2023-11-10 DIAGNOSIS — Z51.81 ENCOUNTER FOR MONITORING AROMATASE INHIBITOR THERAPY: ICD-10-CM

## 2023-11-10 DIAGNOSIS — Z08 ENCOUNTER FOR FOLLOW-UP SURVEILLANCE OF BREAST CANCER: ICD-10-CM

## 2023-11-10 DIAGNOSIS — Z85.3 ENCOUNTER FOR FOLLOW-UP SURVEILLANCE OF BREAST CANCER: ICD-10-CM

## 2023-11-10 DIAGNOSIS — Z79.811 ENCOUNTER FOR MONITORING AROMATASE INHIBITOR THERAPY: ICD-10-CM

## 2023-11-10 PROCEDURE — 99211 OFF/OP EST MAY X REQ PHY/QHP: CPT

## 2023-11-10 RX ORDER — LETROZOLE 2.5 MG/1
2.5 TABLET, FILM COATED ORAL DAILY
Qty: 90 TABLET | Refills: 3 | Status: SHIPPED | OUTPATIENT
Start: 2023-11-10

## 2023-11-13 ENCOUNTER — OFFICE VISIT (OUTPATIENT)
Dept: PHYSICAL MEDICINE AND REHAB | Facility: CLINIC | Age: 53
End: 2023-11-13
Payer: COMMERCIAL

## 2023-11-13 VITALS — WEIGHT: 138 LBS | OXYGEN SATURATION: 99 % | BODY MASS INDEX: 23.56 KG/M2 | HEART RATE: 71 BPM | HEIGHT: 64 IN

## 2023-11-13 DIAGNOSIS — M22.2X9 PATELLOFEMORAL PAIN SYNDROME, UNSPECIFIED LATERALITY: ICD-10-CM

## 2023-11-13 DIAGNOSIS — M23.204 OLD TEAR OF MEDIAL MENISCUS OF LEFT KNEE, UNSPECIFIED TEAR TYPE: ICD-10-CM

## 2023-11-13 DIAGNOSIS — M17.10 PRIMARY OSTEOARTHRITIS OF KNEE, UNSPECIFIED LATERALITY: ICD-10-CM

## 2023-11-13 DIAGNOSIS — M25.561 CHRONIC PAIN OF RIGHT KNEE: Primary | ICD-10-CM

## 2023-11-13 DIAGNOSIS — G89.29 CHRONIC PAIN OF RIGHT KNEE: Primary | ICD-10-CM

## 2023-11-13 PROCEDURE — 99214 OFFICE O/P EST MOD 30 MIN: CPT | Performed by: PHYSICAL MEDICINE & REHABILITATION

## 2023-11-13 PROCEDURE — 3008F BODY MASS INDEX DOCD: CPT | Performed by: PHYSICAL MEDICINE & REHABILITATION

## 2023-11-13 NOTE — PATIENT INSTRUCTIONS
1) Please call and schedule your MRI LEFT knee at [phone]. Once you have your MRI scheduled, then call my office again to schedule a follow-up visit soon after your MRI so we may review the images together.   2) Continue therapy and exercises at home  3) Follow up with me to review the images and discuss the next steps

## 2023-11-15 ENCOUNTER — APPOINTMENT (OUTPATIENT)
Dept: PHYSICAL THERAPY | Age: 53
End: 2023-11-15
Attending: PHYSICAL MEDICINE & REHABILITATION
Payer: COMMERCIAL

## 2023-11-17 ENCOUNTER — APPOINTMENT (OUTPATIENT)
Dept: PHYSICAL THERAPY | Age: 53
End: 2023-11-17
Attending: PHYSICAL MEDICINE & REHABILITATION
Payer: COMMERCIAL

## 2023-11-26 ENCOUNTER — PATIENT MESSAGE (OUTPATIENT)
Dept: PHYSICAL MEDICINE AND REHAB | Facility: CLINIC | Age: 53
End: 2023-11-26

## 2023-11-27 NOTE — TELEPHONE ENCOUNTER
Location of Pain: low back pain  Old or new pain: old, worsening since tried to bend over. Date Pain Began: yesterday            Numeric Rating Scale:  Pain at Present:  8                                                                                                            (No Pain) 0  to  10 (Worst Pain)                 Minimum Pain:   4  Maximum Pain  9    Distribution of Pain:    bilateral  Quality of Pain:   aching, pulling pain per patient  Aggravating Factors: Other movement  Current pain treatment: PT in the past (has worked ), tried 3 advil (little help), ice hourlu (some help), biofreeze (not helpful). QVW:[id number] Lafaye Duane, MD   NOV: Visit date not found     Summary of patient request: Pt requested a sooner follow up appointment. Pt  scheduled for a follow up on 11/28/2023. No further actions needed at this time. Closing the encounter.

## 2023-11-28 ENCOUNTER — OFFICE VISIT (OUTPATIENT)
Dept: PHYSICAL MEDICINE AND REHAB | Facility: CLINIC | Age: 53
End: 2023-11-28
Payer: COMMERCIAL

## 2023-11-28 VITALS — HEIGHT: 64 IN | BODY MASS INDEX: 23.56 KG/M2 | HEART RATE: 85 BPM | OXYGEN SATURATION: 100 % | WEIGHT: 138 LBS

## 2023-11-28 DIAGNOSIS — S39.012A STRAIN OF LUMBAR REGION, INITIAL ENCOUNTER: ICD-10-CM

## 2023-11-28 DIAGNOSIS — M47.816 FACET SYNDROME, LUMBAR: ICD-10-CM

## 2023-11-28 DIAGNOSIS — M48.061 LUMBAR FORAMINAL STENOSIS: Primary | ICD-10-CM

## 2023-11-28 DIAGNOSIS — M51.36 BULGE OF LUMBAR DISC WITHOUT MYELOPATHY: ICD-10-CM

## 2023-11-28 DIAGNOSIS — M47.816 LUMBAR SPONDYLOSIS: ICD-10-CM

## 2023-11-28 DIAGNOSIS — M62.830 SPASM OF MUSCLE OF LOWER BACK: ICD-10-CM

## 2023-11-28 DIAGNOSIS — M51.37 DDD (DEGENERATIVE DISC DISEASE), LUMBOSACRAL: ICD-10-CM

## 2023-11-28 PROCEDURE — 99214 OFFICE O/P EST MOD 30 MIN: CPT | Performed by: PHYSICAL MEDICINE & REHABILITATION

## 2023-11-28 PROCEDURE — 3008F BODY MASS INDEX DOCD: CPT | Performed by: PHYSICAL MEDICINE & REHABILITATION

## 2023-11-28 RX ORDER — CYCLOBENZAPRINE HCL 5 MG
TABLET ORAL
Qty: 90 TABLET | Refills: 0 | Status: SHIPPED | OUTPATIENT
Start: 2023-11-28

## 2023-11-28 RX ORDER — METHYLPREDNISOLONE 4 MG/1
TABLET ORAL
Qty: 2 EACH | Refills: 0 | Status: SHIPPED | OUTPATIENT
Start: 2023-11-28

## 2023-11-28 RX ORDER — NAPROXEN 500 MG/1
500 TABLET ORAL 2 TIMES DAILY WITH MEALS
Qty: 60 TABLET | Refills: 0 | Status: SHIPPED | OUTPATIENT
Start: 2023-11-28

## 2023-11-28 NOTE — PATIENT INSTRUCTIONS
1) Take Naprosyn 500 mg 1 tablet twice per day with food for the next two weeks and then as needed but no more than 2 tablets per day. Do not take with any other NSAIDS (Ibuprofen, Advil, Aleve, etc). OK to take Tylenol 500 mg every 6 hours as needed for pain. If you develop any side effects including stomach aches, nausea, vomiting, or other gastrointestinal symptoms, stop the medication and call my office. 2) Start cyclobenzaprine 5 mg 0.5-2 tablets three times per day as needed for spasms. Do not operate heavy machinery while on this medication as it may make you sleepy. 3) Tylenol 500-1000 mg every 6-8 hours as needed for pain. No more than 3000 mg daily. 4) I spoke with the patient and instructed them to take the double dose of the medrol dose pack as follows: Take all of the day 1 tablets for both packages together in the morning, Take all of the day 2 tablets for both packages together in the morning on day 2, Take all of the day 3 tablets for both packages together in the morning of day 3, Take all of the day 4 tablets for both packages together in the morning of day 4, Take all of the day 5 tablets for both packages together in the morning of day 5, Take all of the day 6 tablets for both packages together in the morning of day 6.  5) Take Naprosyn, Tylenol, and Flexeril (muscle relaxer). See how you feel on Thursday prior to your trip. If you feel pain is continued, then take the oral steroids like we discussed. Stop taking the Naprosyn if you start the oral steroids. 6) Continue home exercise program and get into PT if you need it. 7) Follow up with me in about 1 month.

## 2023-12-11 ENCOUNTER — APPOINTMENT (OUTPATIENT)
Dept: PHYSICAL THERAPY | Age: 53
End: 2023-12-11
Attending: PHYSICAL MEDICINE & REHABILITATION
Payer: COMMERCIAL

## 2023-12-12 ENCOUNTER — OFFICE VISIT (OUTPATIENT)
Dept: SURGERY | Facility: CLINIC | Age: 53
End: 2023-12-12
Payer: COMMERCIAL

## 2023-12-12 DIAGNOSIS — Z90.13 ABSENCE OF BREAST, BILATERAL: Primary | ICD-10-CM

## 2023-12-12 PROCEDURE — 99212 OFFICE O/P EST SF 10 MIN: CPT | Performed by: SURGERY

## 2023-12-12 NOTE — PROGRESS NOTES
Alyssia Sanford is a 52year old female who presents today for a yearly follow-up. She is without new complaints. Specifically, she denies any masses, skin changes, or nipple discharge. She has a history of bilateral mastectomy and reconstruction with style [employer] smooth round implant in February 2022      Physical Examination:  HEENT: There is no cervical or supraclavicular lymphadenopathy noted. Breasts: Bilateral breasts are soft without palpable masses. There is no nipple inversion or nipple discharge noted. There is no axillary lymphadenopathy noted bilaterally. There is no erythema or seroma noted. Assessment and Plan:  Patient is doing well. We reviewed the recommended FDA surveillance protocol for silicone implants. We discussed continuing regular self breast examination with a plan for follow-up in 1 year or sooner if any changes are detected. The plan was reviewed with the patient and questions were answered.

## 2023-12-15 ENCOUNTER — HOSPITAL ENCOUNTER (OUTPATIENT)
Dept: MRI IMAGING | Age: 53
Discharge: HOME | End: 2023-12-15
Attending: PHYSICAL MEDICINE & REHABILITATION
Payer: COMMERCIAL

## 2023-12-15 DIAGNOSIS — M22.2X9 PATELLOFEMORAL PAIN SYNDROME, UNSPECIFIED LATERALITY: ICD-10-CM

## 2023-12-15 DIAGNOSIS — G89.29 CHRONIC PAIN OF RIGHT KNEE: ICD-10-CM

## 2023-12-15 DIAGNOSIS — M23.204 OLD TEAR OF MEDIAL MENISCUS OF LEFT KNEE, UNSPECIFIED TEAR TYPE: ICD-10-CM

## 2023-12-15 DIAGNOSIS — M17.10 PRIMARY OSTEOARTHRITIS OF KNEE, UNSPECIFIED LATERALITY: ICD-10-CM

## 2023-12-15 DIAGNOSIS — M25.561 CHRONIC PAIN OF RIGHT KNEE: ICD-10-CM

## 2023-12-15 PROCEDURE — 73721 MRI JNT OF LWR EXTRE W/O DYE: CPT | Performed by: PHYSICAL MEDICINE & REHABILITATION

## 2023-12-22 ENCOUNTER — TELEMEDICINE (OUTPATIENT)
Dept: PHYSICAL MEDICINE AND REHAB | Facility: CLINIC | Age: 53
End: 2023-12-22
Payer: COMMERCIAL

## 2023-12-22 DIAGNOSIS — M54.59 MECHANICAL LOW BACK PAIN: ICD-10-CM

## 2023-12-22 DIAGNOSIS — M23.204 OLD TEAR OF MEDIAL MENISCUS OF LEFT KNEE, UNSPECIFIED TEAR TYPE: ICD-10-CM

## 2023-12-22 DIAGNOSIS — M54.16 LUMBAR RADICULOPATHY: ICD-10-CM

## 2023-12-22 DIAGNOSIS — G89.29 CHRONIC PAIN OF RIGHT KNEE: ICD-10-CM

## 2023-12-22 DIAGNOSIS — M17.10 PRIMARY OSTEOARTHRITIS OF KNEE, UNSPECIFIED LATERALITY: ICD-10-CM

## 2023-12-22 DIAGNOSIS — M25.561 CHRONIC PAIN OF RIGHT KNEE: ICD-10-CM

## 2023-12-22 DIAGNOSIS — M47.816 LUMBAR SPONDYLOSIS: ICD-10-CM

## 2023-12-22 DIAGNOSIS — M48.061 LUMBAR FORAMINAL STENOSIS: ICD-10-CM

## 2023-12-22 DIAGNOSIS — S39.012A STRAIN OF LUMBAR REGION, INITIAL ENCOUNTER: ICD-10-CM

## 2023-12-22 DIAGNOSIS — M79.10 MYALGIA: ICD-10-CM

## 2023-12-22 DIAGNOSIS — M47.816 FACET SYNDROME, LUMBAR: ICD-10-CM

## 2023-12-22 DIAGNOSIS — M22.2X9 PATELLOFEMORAL PAIN SYNDROME, UNSPECIFIED LATERALITY: Primary | ICD-10-CM

## 2023-12-22 DIAGNOSIS — M51.37 DDD (DEGENERATIVE DISC DISEASE), LUMBOSACRAL: ICD-10-CM

## 2023-12-22 DIAGNOSIS — M51.36 BULGE OF LUMBAR DISC WITHOUT MYELOPATHY: ICD-10-CM

## 2023-12-22 DIAGNOSIS — M62.830 SPASM OF MUSCLE OF LOWER BACK: ICD-10-CM

## 2023-12-22 DIAGNOSIS — S76.019A STRAIN OF GLUTEUS MEDIUS, UNSPECIFIED LATERALITY, INITIAL ENCOUNTER: ICD-10-CM

## 2023-12-22 PROCEDURE — 99214 OFFICE O/P EST MOD 30 MIN: CPT | Performed by: PHYSICAL MEDICINE & REHABILITATION

## 2023-12-22 NOTE — PROGRESS NOTES
[address]  Video Visit Progress Note      Telehealth outside of [address] Verbal Consent   I conducted a telehealth visit with Mj Becerra today, 12/21/23, which was completed using two-way, real-time interactive audio and video communication. This has been done in good lyndon to provide continuity of care in the best interest of the provider-patient relationship, due to the COVID -19 public health crisis/national emergency where restrictions of face-to-face office visits are ongoing. Every conscious effort was taken to allow for sufficient and adequate time to complete the visit. The patient was made aware of the limitations of the telehealth visit, including treatment limitations as no physical exam could be performed. The patient was advised to call 911 or to go to the ER in case there was an emergency. The patient was also advised of the potential privacy & security concerns related to the telehealth platform. The patient was made aware of where to find [facility] notice of privacy practices, telehealth consent form and other related consent forms and documents. which are located on the [facility] website. The patient verbally agreed to telehealth consent form, related consents and the risks discussed. Lastly, the patient confirmed that they were in [place]. Included in this visit, time may have been spent reviewing labs, medications, radiology tests and decision making. Appropriate medical decision-making and tests are ordered as detailed in the plan of care above. Coding/billing information is submitted for this visit based on complexity of care and/or time spent for the visit. CHIEF COMPLAINT:    Chief Complaint   Patient presents with    Knee Pain    Imaging       History of Present Illness:   The patient is a 52year old female with a significant history of breast cancer who presents for follow-up of her low back pain which I had seen her for November 28, 2023. At that time, I recommended physical therapy and NSAIDs. I also prescribed her Medrol Dosepak. She is also following up for her left knee pain and had an MRI of the left knee performed which I dependently reviewed. She is doing very well as a pertains to the low back. Her symptoms improved with the cyclobenzaprine and naproxen. She did not need to take the Medrol Dosepak. She has also improved as a pertains to her knee pain. She would prefer not to have medication or injections for this. PAST MEDICAL HISTORY:  Past Medical History:   Diagnosis Date    Back problem     herniated disc    Breast cancer ([facility] [address] 12/2021    left breast    Cataract     Colon polyps     Diverticulosis     Esophageal reflux     Gastritis     Hiatal hernia     Hx of motion sickness     Microscopic hematuria 2010    Migraines     menstrual    PONV (postoperative nausea and vomiting)        SURGICAL HISTORY:  Past Surgical History:   Procedure Laterality Date    BREAST RECONSTRUCTION  02/2022    COLONOSCOPY      COLONOSCOPY N/A 6/8/2018    Procedure: COLONOSCOPY;  Surgeon: Alison Angulo MD;  Location: [address] [place] [address] ENDOSCOPY    COLONOSCOPY N/A 6/27/2023    Procedure: COLONOSCOPY;  Surgeon: Alison Angulo MD;  Location: [facility] ENDO    CYST ASPIRATION LEFT  09/2016    YE BIOPSY STEREO NODULE 1 SITE LEFT (CPT=19081)  10/19/2021    2 site left calcs top hat and cork    MASTECTOMY LEFT  12/2021    MASTECTOMY RIGHT  12/2021    OTHER      Rhinoplasty    REMOVAL OF OVARIAN CYST(S)  2009    laparascopic right ov cystectomy, paratubal cyst drainage       SOCIAL HISTORY:   Social History     Occupational History    Not on file   Tobacco Use    Smoking status: Never    Smokeless tobacco: Never   Vaping Use    Vaping Use: Never used   Substance and Sexual Activity    Alcohol use: Yes     Alcohol/week: 5.0 standard drinks of alcohol     Types: 3 Glasses of wine, 2 Shots of liquor per week     Comment: occasional    Drug use:  No Sexual activity: Yes       FAMILY HISTORY:   Family History   Problem Relation Age of Onset    Breast Cancer Mother 76    Neurological Disorder Mother         Cerebral Aneurysm    Dementia Mother         Due to brain aneurysm 21 years prior    Heart Disease Father         CAD    Prostate Cancer Father 80    Diabetes Brother     Schizophrenia Brother     Other (cerebral hemmorage) Brother     Breast Cancer Paternal Aunt 80    No Known Problems Sister     Colon Cancer Paternal Grandmother     Colon Cancer Paternal Cousin Male 62       CURRENT MEDICATIONS:   Current Outpatient Medications   Medication Sig Dispense Refill    naproxen (NAPROSYN) 500 MG Oral Tab Take 1 tablet (500 mg total) by mouth 2 (two) times daily with meals. Take for 2 weeks as directed and then as needed. 60 tablet 0    methylPREDNISolone (MEDROL) 4 MG Oral Tablet Therapy Pack As directed 2 each 0    cyclobenzaprine 5 MG Oral Tab 5 mg 1-2 tablets three times per day as needed for spasms. Do not operate heavy machinery while on this medication as it may make you sleepy 90 tablet 0    Omega-3 Fatty Acids (OMEGA 3 500 OR) Take by mouth. Glucosamine-Chondroit-Vit C-Mn (GLUCOSAMINE 1500 COMPLEX OR)       letrozole 2.5 MG Oral Tab Take 1 tablet (2.5 mg total) by mouth daily. 90 tablet 3    Cholecalciferol (VITAMIN D) 50 MCG (2000 UT) Oral Tab Take by mouth. Docusate Sodium (STOOL SOFTENER OR) Take by mouth every other day. Biotin 1000 MCG Oral Tab Take 1,000 mcg by mouth daily. Ascorbic Acid (VITAMIN C) 1000 MG Oral Tab Take 1 tablet (1,000 mg total) by mouth daily. ALLERGIES:   Allergies   Allergen Reactions    Iodine [Radiology Contrast Iodinated Dyes] HIVES and RASH    Penicillins HIVES and RASH       REVIEW OF SYSTEMS:   Review of Systems   Constitutional: Negative. HENT: Negative. Eyes: Negative. Respiratory: Negative. Cardiovascular: Negative. Gastrointestinal: Negative. Genitourinary: Negative. Musculoskeletal: As per HPI  Skin: Negative. Neurological: As per HPI  Endo/Heme/Allergies: Negative. Psychiatric/Behavioral: Negative. All other systems reviewed and are negative. Pertinent positives and negatives noted in the HPI. PHYSICAL EXAM:     There is no height or weight on file to calculate BMI. General: No immediate distress  Head: Normocephalic/ Atraumatic  Eyes: Extra-occular movements intact  Ears/Nose/Throat:  External appearance identifies normal appearance without obvious deformity  Cardiovascular: No cyanosis, clubbing or edema  Respiratory: Non-labored respirations  Skin: No lesions noted   Neurological: alert & oriented x 3, attentive, able to follow commands, comprehention intact, spontaneous speech intact  Psychiatric: Mood and affect appropriate        Data  Office Visit on 10/19/2023   Component Date Value Ref Range Status    HPV High Risk 10/19/2023 Negative  Negative Final    HPV Source 10/19/2023 Cervical/endocervical   Final    Interpretation/Result 10/19/2023 Negative for intraepithelial lesion or malignancy  Negative for intraepithelial lesion or malignancy Final    Specimen Adequacy 10/19/2023 Satisfactory for evaluation. Endocervical or metaplastic cells present   Final    General Categorization 10/19/2023 Negative for intraepithelial lesion or malignancy     Final    HPV High Risk mRNA 10/19/2023    Final                    Value: This result contains rich text formatting which cannot be displayed here. Recommendations/Comments 10/19/2023    Final                    Value: This result contains rich text formatting which cannot be displayed here. Procedure 10/19/2023    Final                    Value: This result contains rich text formatting which cannot be displayed here. Clinical Information 10/19/2023    Final                    Value: This result contains rich text formatting which cannot be displayed here.     Reason for testing 10/19/2023 Screening Final    Gyn Additional Information 10/19/2023    Final                    Value: This result contains rich text formatting which cannot be displayed here. Case Report 10/19/2023    Final                    Value:Gynecologic Cytology                              Case: [id number]                                  Authorizing Provider:  Dora Morillo MD         Collected:           10/19/2023 09:22 AM          Ordering Location:     [facility]    Received:            10/20/2023 01:44 PM                                 [facility]                                                                                 [place] - OB/GYN                                                             First Screen:          Hassel Goldberg                                                               Specimen: ThinPrep Imager Screening Pap, Cervical/endocervical                                      Lab Requisition on 09/13/2023   Component Date Value Ref Range Status    Case Report 09/13/2023    Final                    Value:Surgical Pathology                                Case: [id number]                                  Authorizing Provider:  Dilshad Mendez., Waqar Lewis MD  Collected:           09/13/2023 11:30 AM          Ordering Location:     [facility]          Received:            09/14/2023 07:50 AM                                 Laboratory                                                                   Pathologist:           Shelly Velazquez MD                                                        Specimen:    Back, Basal Cell Cancer Left Upper Medial Back Re-excision Suture at 12                    Final Diagnosis: 09/13/2023    Final                    Value: This result contains rich text formatting which cannot be displayed here. Clinical Information 09/13/2023    Final                    Value: This result contains rich text formatting which cannot be displayed here.     Danielle Gee Description 09/13/2023    Final                    Value: This result contains rich text formatting which cannot be displayed here.     Interpretation 09/13/2023 Abnormal (A)    Final   [facility] Lab Encounter on 08/30/2023   Component Date Value Ref Range Status    FSH 08/30/2023 33.2  No established range for female sex mIU/mL Final    LH 08/30/2023 15.3  No established range for female sex mIU/mL Final    Estradiol 08/30/2023 18.5  No established range for female sex pg/mL Final    Glucose 08/30/2023 104 (H)  70 - 99 mg/dL Final    Sodium 08/30/2023 139  136 - 145 mmol/L Final    Potassium 08/30/2023 4.2  3.5 - 5.1 mmol/L Final    Chloride 08/30/2023 108  98 - 112 mmol/L Final    CO2 08/30/2023 29.0  21.0 - 32.0 mmol/L Final    Anion Gap 08/30/2023 2  0 - 18 mmol/L Final    BUN 08/30/2023 16  7 - 18 mg/dL Final    Creatinine 08/30/2023 0.74  0.55 - 1.02 mg/dL Final    Calcium, Total 08/30/2023 9.4  8.5 - 10.1 mg/dL Final    Calculated Osmolality 08/30/2023 289  275 - 295 mOsm/kg Final    eGFR-Cr 08/30/2023 95  >=60 mL/min/1.73m2 Final    AST 08/30/2023 21  15 - 37 U/L Final    ALT 08/30/2023 32  13 - 56 U/L Final    Alkaline Phosphatase 08/30/2023 59  41 - 108 U/L Final    Bilirubin, Total 08/30/2023 0.5  0.1 - 2.0 mg/dL Final    Total Protein 08/30/2023 7.6  6.4 - 8.2 g/dL Final    Albumin 08/30/2023 4.0  3.4 - 5.0 g/dL Final    Globulin  08/30/2023 3.6  2.8 - 4.4 g/dL Final    A/G Ratio 08/30/2023 1.1  1.0 - 2.0 Final    Patient Fasting for CMP? 08/30/2023 Yes   Final    Cholesterol, Total 08/30/2023 207 (H)  <200 mg/dL Final    HDL Cholesterol 08/30/2023 102 (H)  40 - 59 mg/dL Final    Triglycerides 08/30/2023 99  30 - 149 mg/dL Final    LDL Cholesterol 08/30/2023 88  <100 mg/dL Final    VLDL 08/30/2023 16  0 - 30 mg/dL Final    Non HDL Chol 08/30/2023 105  <130 mg/dL Final    Patient Fasting for Lipid? 08/30/2023 Yes   Final    TSH 08/30/2023 2.050  0.358 - 3.740 mIU/mL Final    Vitamin B12 08/30/2023 287  193  986 pg/mL Final    Sed Rate 08/30/2023 1  0 - 30 mm/Hr Final    C-Citrullinated Peptide IgG AB 08/30/2023 1.4  0.0 - 6.9 U/mL Final    Rheumatoid Factor 08/30/2023 <10  <15 IU/mL Final    C-Reactive Protein 08/30/2023 <0.29  <0.30 mg/dL Final    Vitamin D, 25OH, Total 08/30/2023 30.4  30.0 - 100.0 ng/mL Final    WBC 08/30/2023 7.5  4.0 - 11.0 x10(3) uL Final    RBC 08/30/2023 4.84  3.80 - 5.30 x10(6)uL Final    HGB 08/30/2023 15.3  12.0 - 16.0 g/dL Final    HCT 08/30/2023 45.3  35.0 - 48.0 % Final    PLT 08/30/2023 216.0  150.0 - 450.0 10(3)uL Final    MCV 08/30/2023 93.6  80.0 - 100.0 fL Final    MCH 08/30/2023 31.6  26.0 - 34.0 pg Final    MCHC 08/30/2023 33.8  31.0 - 37.0 g/dL Final    RDW 08/30/2023 12.0  % Final    Neutrophil Absolute Prelim 08/30/2023 5.10  1.50 - 7.70 x10 (3) uL Final    Neutrophil Absolute 08/30/2023 5.10  1.50 - 7.70 x10(3) uL Final    Lymphocyte Absolute 08/30/2023 1.78  1.00 - 4.00 x10(3) uL Final    Monocyte Absolute 08/30/2023 0.43  0.10 - 1.00 x10(3) uL Final    Eosinophil Absolute 08/30/2023 0.09  0.00 - 0.70 x10(3) uL Final    Basophil Absolute 08/30/2023 0.03  0.00 - 0.20 x10(3) uL Final    Immature Granulocyte Absolute 08/30/2023 0.02  0.00 - 1.00 x10(3) uL Final    Neutrophil % 08/30/2023 68.4  % Final    Lymphocyte % 08/30/2023 23.9  % Final    Monocyte % 08/30/2023 5.8  % Final    Eosinophil % 08/30/2023 1.2  % Final    Basophil % 08/30/2023 0.4  % Final    Immature Granulocyte % 08/30/2023 0.3  % Final   Admission on 06/27/2023, Discharged on 06/27/2023   Component Date Value Ref Range Status    POCT Urine Pregnancy 06/27/2023 Negative  Negative Final   ]      Radiology Imaging:  I reviewed with the patient her MRI of the lumbar spine and knees left   MRI KNEE, LEFT (BUN=[id number])  Narrative: PROCEDURE: MRI KNEE, LEFT (HRW=[id number])     COMPARISON: None.      INDICATIONS: M23.204 Old tear of medial meniscus of left knee, unspecified tear type M17.10 Primary osteoarthritis of knee, unspecified laterality M22.2X9 Patellofemoral pa*     TECHNIQUE: A complete multi-planar MRI was performed. FINDINGS:   MEDIAL COMPARTMENT  MEDIAL MENISCUS: Radial tear posterior horn image 9 series 7 (image 17 series 4, and series 5). ARTICULAR CARTILAGE: 1.7 cm full-thickness articular cartilage defect anterior weight-bearing surface of femoral condyle. SUBCHONDRAL BONE: 1.8 cm subchondral marrow edema femoral condyle. LATERAL COMPARTMENT  LATERAL MENISCUS: Intact. ARTICULAR CARTILAGE: Mild irregular articular cartilage thinning lateral tibial plateau. No full-thickness chondral defect. SUBCHONDRAL BONE: Intact. PATELLOFEMORAL COMPARTMENT-  ARTICULAR CARTILAGE A 2-3 mm articular cartilage defect medial facet adjacent to the median ridge of the patella with a small undermining flap on image number 11 series 4, image 13 series 6. Mild articular cartilage thinning medial femoral trochlea. SUBCHONDRAL BONE Intact. QUADRICEPS TENDON Intact. PATELLAR TENDON Intact. RETINACULA Intact. ACL: Intact. PCL: Intact. MCL intact. LCL Intact. POSTEROLATERAL SUPPORTING STRUCTURES: Intact. EFFUSION: Small knee joint effusion slight irregularity to the intra-articular fat compatible with a cut mild reactive synovitis. OTHER: No suspicious bone lesion or occult fracture. Impression: CONCLUSION:   1. Small radial tear posterior horn medial meniscus. 2. Osteoarthritis with a a 1.7 cm full-thickness chondral defect medial femoral condyle associated with subchondral marrow edema. 3. A 3 mm cartilage defect medial facet of patella with small undermining flap. Mild patellofemoral osteoarthritis. 4. Small knee joint effusion with a mild reactive synovitis.              Dictated by (CST): Milagros Ferraro MD on 12/15/2023 at 2:33 PM       Finalized by (CST): Milagros Ferraro MD on 12/15/2023 at 2:44 PM            PROCEDURE: MRI SPINE LUMBAR (CPT=72148)     COMPARISON: [facility], [facility]A [facility] LUMBAR SPINE (MIN 4 VIEWS) (CPT=72110), 7/16/2023, 8:57 AM.  [address], 8/20/2006, 1:26 PM.     INDICATIONS: Z17.0 Malignant neoplasm of upper-outer quadrant of left breast in female, estrogen receptor positive  C50.412 Malignant neoplasm of upper-outer quadrant of le*     TECHNIQUE: A variety of imaging planes and parameters were utilized for visualization of suspected pathology. FINDINGS:  NUMERATION: For the purposes of this examination, the lowest fully formed disc space is designated as L5-S1.  ALIGNMENT: There is preservation of the expected lumbar lordosis. BONES: No fracture or suspicious osseous lesion is evident. CORD/CAUDA EQUINA: The distal cord and nerve roots have normal caliber, contour, and signal intensity. PARASPINAL AREA: Incidental S2-S3 sacral Tarlov cyst.  OTHER: Negative. LUMBAR DISC LEVELS:  L1-L2: No significant disc/facet abnormality, spinal stenosis, or foraminal stenosis. L2-L3: No significant disc/facet abnormality, spinal stenosis, or foraminal stenosis. L3-L4: No significant disc/facet abnormality, spinal stenosis, or foraminal stenosis. L4-L5: Small diffuse disc bulge with slight ligamentum flavum redundancy and minimal bilateral facet arthrosis dropped a 3 in addition to a tiny left subarticular/foraminal zone disc protrusion/annular fissure. No significant resultant spinal canal,  neural foraminal, or lateral recess compromise. L5-S1: Central/bilateral paracentral disc protrusion with mild bilateral facet arthropathy. Mild bilateral lateral recess stenosis with no substantial spinal canal or neural foraminal compromise. Impression   CONCLUSION:     1. Multilevel degenerative changes of the lumbar spine as detailed. Notable levels as follows:     2. L5-S1:  Broad-based central/bilateral paracentral disc protrusion, which results in mild bilateral lateral recess stenosis. 3.  L4-L5:  Disc disease with a small left subarticular/foraminal zone protrusion with associated annular fissure. No significant resultant neural compromise. 4. No suspicious marrow replacing foci throughout the imaged lumbosacral spine to suggest osseous metastatic disease. 5. Lesser incidental findings as above.        elm-remote     Dictated by (CST): Jerman Bernal MD on 9/08/2023 at 12:34 PM      Finalized by (CST): Jerman Bernal MD on 9/08/2023 at 12:40 P     ASSESSMENT AND PLAN:  Patrick aHnnah is a pleasant 24-year-old female presents for follow-up of her left knee pain as well as left-sided low back pain with radiation down the left leg. At this time, she is doing pretty well and rates her discomfort about a 3 out of 10. I am recommending she continue with her home exercise program and use naproxen as needed for pain. I have reviewed her MRI of the left knee with her today. If her symptoms return or worsen, we can consider a left knee hyaluronic acid and corticosteroid injection under ultrasound guidance. RTC in as needed  Discharge Instructions were provided as documented in AVS summary. The patient was in agreement with the assessment and plan. All questions were answered. There were no barriers to learning. We discussed that a telemedicine visit is in place of an office visit; however, this limits the ability to perform a thorough physical examination which may affect objective findings related to a specific condition and can affect treatment. We also discussed that NSAIDs may mask or worsen COVID-19 infection symptoms. The patient was also informed that corticosteroids, in any form, may significantly decrease immune response and may increase risk and complications of infection. The patient was advised that given the current situation with COVID-19, it is in his/her best interest to socially distance his/herself.  Given this, we are not recommending any elective procedures or office visits at the outpatient surgery center or in the office respectively unless deemed necessary. My staff will be reaching out to the patient for the elective procedure when it is considered appropriate and the patient can follow-up in office as well when appropriate. In the meantime, I will be available for telephone and video encounter. 1. Patellofemoral pain syndrome, unspecified laterality    2. Primary osteoarthritis of knee, unspecified laterality    3. Chronic pain of right knee        Shahab Chang MD  Physical Medicine and Rehabilitation/Sports Medicine  MEDICAL CENTER [facility]

## 2023-12-26 RX ORDER — NAPROXEN 500 MG/1
500 TABLET ORAL 2 TIMES DAILY WITH MEALS
Qty: 60 TABLET | Refills: 0 | Status: SHIPPED | OUTPATIENT
Start: 2023-12-26

## 2023-12-26 NOTE — TELEPHONE ENCOUNTER
Refill Request    Medication request: naproxen (NAPROSYN) 500 MG Oral Tab  Take 1 tablet (500 mg total) by mouth 2 (two) times daily with meals. Take for 2 weeks as directed and then as needed. [id number] Jurgen Coffey MD   Due back to clinic per last office note:  \"Follow up with me in about 1 month. \"  NOV: Visit date not found      [place]: 11/28/2023 #60    Urine drug screen (if applicable): N/A  Pain contract: N/A    LOV plan (if weaning or changing medications): Per Dr. Hilary Sandoval note: \"Take Naprosyn 500 mg 1 tablet twice per day with food for the next two weeks and then as needed but no more than 2 tablets per day. Do not take with any other NSAIDS (Ibuprofen, Advil, Aleve, etc). OK to take Tylenol 500 mg every 6 hours as needed for pain. If you develop any side effects including stomach aches, nausea, vomiting, or other gastrointestinal symptoms, stop the medication and call my office. 2) Start cyclobenzaprine 5 mg 0.5-2 tablets three times per day as needed for spasms. Do not operate heavy machinery while on this medication as it may make you sleepy. 3) Tylenol 500-1000 mg every 6-8 hours as needed for pain. No more than 3000 mg daily. \" \"terry Naprosyn, Tylenol, and Flexeril (muscle relaxer). See how you feel on Thursday prior to your trip. If you feel pain is continued, then take the oral steroids like we discussed. Stop taking the Naprosyn if you start the oral steroids.  \"

## 2024-01-03 ENCOUNTER — APPOINTMENT (OUTPATIENT)
Dept: HEMATOLOGY/ONCOLOGY | Facility: HOSPITAL | Age: 54
End: 2024-01-03
Attending: INTERNAL MEDICINE
Payer: COMMERCIAL

## 2024-04-02 ENCOUNTER — TELEPHONE (OUTPATIENT)
Dept: PHYSICAL MEDICINE AND REHAB | Facility: CLINIC | Age: 54
End: 2024-04-02

## 2024-04-02 ENCOUNTER — OFFICE VISIT (OUTPATIENT)
Dept: PHYSICAL MEDICINE AND REHAB | Facility: CLINIC | Age: 54
End: 2024-04-02
Payer: COMMERCIAL

## 2024-04-02 VITALS
RESPIRATION RATE: 18 BRPM | HEART RATE: 96 BPM | OXYGEN SATURATION: 97 % | HEIGHT: 64 IN | BODY MASS INDEX: 23.56 KG/M2 | WEIGHT: 138 LBS

## 2024-04-02 DIAGNOSIS — C50.412 MALIGNANT NEOPLASM OF UPPER-OUTER QUADRANT OF LEFT BREAST IN FEMALE, ESTROGEN RECEPTOR POSITIVE (HCC): ICD-10-CM

## 2024-04-02 DIAGNOSIS — Z17.0 MALIGNANT NEOPLASM OF UPPER-OUTER QUADRANT OF LEFT BREAST IN FEMALE, ESTROGEN RECEPTOR POSITIVE (HCC): ICD-10-CM

## 2024-04-02 DIAGNOSIS — M25.561 CHRONIC PAIN OF RIGHT KNEE: ICD-10-CM

## 2024-04-02 DIAGNOSIS — M22.2X9 PATELLOFEMORAL PAIN SYNDROME, UNSPECIFIED LATERALITY: Primary | ICD-10-CM

## 2024-04-02 DIAGNOSIS — M17.10 PRIMARY OSTEOARTHRITIS OF KNEE, UNSPECIFIED LATERALITY: ICD-10-CM

## 2024-04-02 DIAGNOSIS — M25.462 EFFUSION OF BURSA OF LEFT KNEE: ICD-10-CM

## 2024-04-02 DIAGNOSIS — G89.29 CHRONIC PAIN OF RIGHT KNEE: ICD-10-CM

## 2024-04-02 PROCEDURE — 99214 OFFICE O/P EST MOD 30 MIN: CPT | Performed by: PHYSICAL MEDICINE & REHABILITATION

## 2024-04-02 NOTE — PATIENT INSTRUCTIONS
1) My office will call you to schedule the LEFt knee HA and CSI under ultrasound once the procedure is approved by your insurance carrier.    Lets plan for Tuesday 4/9 at 4:20 pm in [facility]

## 2024-04-02 NOTE — TELEPHONE ENCOUNTER
Initiated Left knee Durolane and corticosteroid injection under ultrasound guidance CPT Code: 83195, J330,  or  & Dx: M17.10 with [employer]- Spoke Quita NINA   Status: Pending & clincals have been faxed to [phone]  Preferred: Monovisc/Orthovisc/Synvisc 1.

## 2024-04-02 NOTE — PROGRESS NOTES
[facility]  Progress Note    CHIEF COMPLAINT:    Chief Complaint   Patient presents with    Follow - Up     Pt is coming in for left knee pain, Pt states that she still more pain and would like to speak with the doctor about possible injection, Denies N/T, Pain 1/10       History of Present Illness:  The patient is a 53 year old  female with a significant history of breast cancer who presents for follow-up of her left knee pain.  She rates her discomfort currently a 1 out of 10 but can be as high as a 2-3 out of 10 when going for long walks.  Occasionally she will feel her left knee give out on her.  She is considering a injection.  She has tried therapy as well as home exercises.  She has also tried naproxen which has been helpful.    PAST MEDICAL HISTORY:  Past Medical History:   Diagnosis Date    Back problem     herniated disc    Breast cancer (HCC) 12/2021    left breast    Cataract     Colon polyps     Diverticulosis     Esophageal reflux     Gastritis     Hiatal hernia     Hx of motion sickness     Microscopic hematuria 2010    Migraines     menstrual    PONV (postoperative nausea and vomiting)        SURGICAL HISTORY:  Past Surgical History:   Procedure Laterality Date    BREAST RECONSTRUCTION  02/2022    COLONOSCOPY      COLONOSCOPY N/A 6/8/2018    Procedure: COLONOSCOPY;  Surgeon: John Silva MD;  Location: [facility] ENDOSCOPY    COLONOSCOPY N/A 6/27/2023    Procedure: COLONOSCOPY;  Surgeon: John Silva MD;  Location: [facility] ENDO    CYST ASPIRATION LEFT  09/2016    YE BIOPSY STEREO NODULE 1 SITE LEFT (CPT=19081)  10/19/2021    2 site left calcs top hat and cork    MASTECTOMY LEFT  12/2021    MASTECTOMY RIGHT  12/2021    OTHER      Rhinoplasty    REMOVAL OF OVARIAN CYST(S)  2009    laparascopic right ov cystectomy, paratubal cyst drainage       SOCIAL HISTORY:   Social History     Occupational History    Not on file   Tobacco Use    Smoking status: Never     Smokeless tobacco: Never   Vaping Use    Vaping Use: Never used   Substance and Sexual Activity    Alcohol use: Yes     Alcohol/week: 5.0 standard drinks of alcohol     Types: 3 Glasses of wine, 2 Shots of liquor per week     Comment: occasional    Drug use: Never    Sexual activity: Yes       FAMILY HISTORY:   Family History   Problem Relation Age of Onset    Breast Cancer Mother 75    Neurological Disorder Mother         Cerebral Aneurysm    Dementia Mother         Due to brain aneurysm 20 years prior    Heart Disease Father         CAD    Prostate Cancer Father 85    Diabetes Brother     Schizophrenia Brother     Other (cerebral hemmorage) Brother     Breast Cancer Paternal Aunt 84    No Known Problems Sister     Colon Cancer Paternal Grandmother     Colon Cancer Paternal Cousin Male 57       CURRENT MEDICATIONS:   Current Outpatient Medications   Medication Sig Dispense Refill    NAPROXEN 500 MG Oral Tab TAKE 1 TABLET (500 MG TOTAL) BY MOUTH 2 (TWO) TIMES DAILY WITH MEALS. TAKE FOR 2 WEEKS AS DIRECTED AND THEN AS NEEDED. 60 tablet 0    methylPREDNISolone (MEDROL) 4 MG Oral Tablet Therapy Pack As directed 2 each 0    cyclobenzaprine 5 MG Oral Tab 5 mg 1-2 tablets three times per day as needed for spasms. Do not operate heavy machinery while on this medication as it may make you sleepy 90 tablet 0    Omega-3 Fatty Acids (OMEGA 3 500 OR) Take by mouth.      letrozole 2.5 MG Oral Tab Take 1 tablet (2.5 mg total) by mouth daily. 90 tablet 3    Cholecalciferol (VITAMIN D) 50 MCG (2000 UT) Oral Tab Take by mouth.      Docusate Sodium (STOOL SOFTENER OR) Take by mouth every other day.      Biotin 1000 MCG Oral Tab Take 1,000 mcg by mouth daily.      Ascorbic Acid (VITAMIN C) 1000 MG Oral Tab Take 1 tablet (1,000 mg total) by mouth daily.      Glucosamine-Chondroit-Vit C-Mn (GLUCOSAMINE 1500 COMPLEX OR)          ALLERGIES:   Allergies   Allergen Reactions    Iodine [Radiology Contrast Iodinated Dyes] HIVES and RASH     Penicillins HIVES and RASH       REVIEW OF SYSTEMS:   Review of Systems   Constitutional: Negative.    HENT: Negative.    Eyes: Negative.    Respiratory: Negative.    Cardiovascular: Negative.    Gastrointestinal: Negative.    Genitourinary: Negative.    Musculoskeletal: As per HPI  Skin: Negative.    Neurological: As per HPI  Endo/Heme/Allergies: Negative.    Psychiatric/Behavioral: Negative.      All other systems reviewed and are negative. Pertinent positives and negatives noted in the HPI.        PHYSICAL EXAM:   Pulse 96   Resp 18   Ht 64\"   Wt 138 lb (62.6 kg)   SpO2 97%   BMI 23.69 kg/m²     Body mass index is 23.69 kg/m².      General: No immediate distress  Head: Normocephalic/ Atraumatic  Eyes: Extra-occular movements intact.   Ears: No auricular hematoma or deformities  Mouth: No lesions or ulcerations  Heart: peripheral pulses intact. Normal capillary refill.   Lungs: Non-labored respirations  Abdomen: No abdominal guarding  Extremities: No lower extremity edema bilaterally   Skin: No lesions noted.   Cognition: alert & oriented x 3, attentive, able to follow 2 step commands, comprehention intact, spontaneous speech intact  Motor:    Musculoskeletal:      Data  Office Visit on 10/19/2023   Component Date Value Ref Range Status    HPV High Risk 10/19/2023 Negative  Negative Final    HPV Source 10/19/2023 Cervical/endocervical   Final    Interpretation/Result 10/19/2023 Negative for intraepithelial lesion or malignancy  Negative for intraepithelial lesion or malignancy Final    Specimen Adequacy 10/19/2023 Satisfactory for evaluation. Endocervical or metaplastic cells present   Final    General Categorization 10/19/2023 Negative for intraepithelial lesion or malignancy     Final    HPV High Risk mRNA 10/19/2023    Final                    Value:This result contains rich text formatting which cannot be displayed here.    Recommendations/Comments 10/19/2023    Final                    Value:This result  contains rich text formatting which cannot be displayed here.    Procedure 10/19/2023    Final                    Value:This result contains rich text formatting which cannot be displayed here.    Clinical Information 10/19/2023    Final                    Value:This result contains rich text formatting which cannot be displayed here.    Reason for testing 10/19/2023 Screening   Final    Gyn Additional Information 10/19/2023    Final                    Value:This result contains rich text formatting which cannot be displayed here.    Case Report 10/19/2023    Final                    Value:Gynecologic Cytology                              Case: [id number]                                  Authorizing Provider:  Kristal Dos Santos MD         Collected:           10/19/2023 09:22 AM          Ordering Location:     [facility]    Received:            10/20/2023 01:44 PM                                 [facility] - OB/GYN                                                             First Screen:          Maryse Manzano                                                               Specimen:    ThinPrep Imager Screening Pap, Cervical/endocervical                                      ]      Radiology Imaging:  I reviewed with the patient her MRI of the knees left from 12/15/2023  MRI KNEE, LEFT (EDH=51130)  Narrative: PROCEDURE: MRI KNEE, LEFT (NSU=12910)     COMPARISON: None.     INDICATIONS: M23.204 Old tear of medial meniscus of left knee, unspecified tear type M17.10 Primary osteoarthritis of knee, unspecified laterality M22.2X9 Patellofemoral pa*     TECHNIQUE: A complete multi-planar MRI was performed.       FINDINGS:   MEDIAL COMPARTMENT  MEDIAL MENISCUS: Radial tear posterior horn image 9 series 7 (image 17 series 4, and series 5).  ARTICULAR CARTILAGE: 1.7 cm full-thickness articular cartilage defect anterior  weight-bearing surface of femoral condyle.  SUBCHONDRAL BONE: 1.8 cm subchondral marrow edema femoral condyle.     LATERAL COMPARTMENT  LATERAL MENISCUS: Intact.  ARTICULAR CARTILAGE: Mild irregular articular cartilage thinning lateral tibial plateau.  No full-thickness chondral defect.  SUBCHONDRAL BONE: Intact.     PATELLOFEMORAL COMPARTMENT-  ARTICULAR CARTILAGE A 2-3 mm articular cartilage defect medial facet adjacent to the median ridge of the patella with a small undermining flap on image number 11 series 4, image 13 series 6. Mild articular cartilage thinning medial femoral trochlea.  SUBCHONDRAL BONE Intact.  QUADRICEPS TENDON Intact.  PATELLAR TENDON Intact.  RETINACULA Intact.     ACL: Intact.    PCL: Intact.  MCL intact.  LCL Intact.  POSTEROLATERAL SUPPORTING STRUCTURES: Intact.     EFFUSION: Small knee joint effusion slight irregularity to the intra-articular fat compatible with a cut mild reactive synovitis.  OTHER: No suspicious bone lesion or occult fracture.              Impression: CONCLUSION:   1. Small radial tear posterior horn medial meniscus.  2. Osteoarthritis with a a 1.7 cm full-thickness chondral defect medial femoral condyle associated with subchondral marrow edema.  3. A 3 mm cartilage defect medial facet of patella with small undermining flap.  Mild patellofemoral osteoarthritis.  4. Small knee joint effusion with a mild reactive synovitis.             Dictated by (CST): Ant Zee MD on 12/15/2023 at 2:33 PM       Finalized by (CST): Ant Zee MD on 12/15/2023 at 2:44 PM              ASSESSMENT AND PLAN:  This is a pleasant 53-year-old female who presents for follow-up of her left knee pain due to osteoarthritis, patellofemoral syndrome, meniscal injury, and an effusion.  I am recommending a left knee hyaluronic acid and corticosteroid injection under ultrasound guidance which we can perform next week.  I will see her again in about 2 months after this procedure.       RTC in  2 months after procedure  Discharge Instructions were provided as documented in AVS summary.  The patient was in agreement with the assessment and plan.  All questions were answered.  There were no barriers to learning.         1. Patellofemoral pain syndrome, unspecified laterality    2. Primary osteoarthritis of knee, unspecified laterality    3. Chronic pain of right knee    4. Effusion of bursa of left knee    5. Malignant neoplasm of upper-outer quadrant of left breast in female, estrogen receptor positive (HCC)        Alex B. Behar MD  Physical Medicine and Rehabilitation/Sports Medicine  [facility]

## 2024-04-08 NOTE — TELEPHONE ENCOUNTER
Received Approval from [employer] for Monovisc  Left Knee Monovisc Approved w/ authorization #[id number] valid 4/2/24-4/2/2025  Plan sponsor [facility] #[id number]

## 2024-04-09 ENCOUNTER — OFFICE VISIT (OUTPATIENT)
Dept: PHYSICAL MEDICINE AND REHAB | Facility: CLINIC | Age: 54
End: 2024-04-09
Payer: COMMERCIAL

## 2024-04-09 DIAGNOSIS — M17.10 PRIMARY OSTEOARTHRITIS OF KNEE, UNSPECIFIED LATERALITY: ICD-10-CM

## 2024-04-09 DIAGNOSIS — M22.2X9 PATELLOFEMORAL PAIN SYNDROME, UNSPECIFIED LATERALITY: Primary | ICD-10-CM

## 2024-04-09 NOTE — PATIENT INSTRUCTIONS
Post Injection Instructions     Please do not do anything strenuous over the next two days (if you had a knee injection do not walk more than 2 city blocks, do not attend any aerobic classes, do not run, no heavy lifting, no prolong standing).  You may resume your day to day activities after your injection.  You may experience some mild amount of swelling after the procedure.  Please ice your joint that was injected at least 5-6 times a day (15 minutes) for two days after (this will help prevent worsening pain that sometimes occurs after an injection).  Only take tylenol if needed for pain for the first few days.  Watch for signs of infection which include redness, warmth, worsening pain, fevers or chills.  If you develop any of these signs call the office immediately at [phone]    Everyone responds differently to injections, but you can expect your peak effects a few weeks after your last injection.  Alex B. Behar MD  Physical Medicine and Rehabilitation/Sports Medicine  [facility]

## 2024-04-09 NOTE — PROCEDURES
[facility]   Knee Joint Injection Procedure Note    CHIEF COMPLAINT:  No chief complaint on file.      PROCEDURE PERFORMED:  Left knee intra-articular Monovisc and corticosteroid injection under ultrasound guidance    INDICATIONS:  Left knee pain and osteoarthritis    PRIMARY PROCEDURALIST:  Alex Behar, MD    INFORMED CONSENT & TIME OUT:   As documented in the Time Out and Pre-Procedure Check Lists.  Verbal consent was obtained    Vitals: [unfilled]  Labs (document last wbc, plts, hgb, and PT/INR):    Office Visit on 10/19/2023   Component Date Value Ref Range Status    HPV High Risk 10/19/2023 Negative  Negative Final    HPV Source 10/19/2023 Cervical/endocervical   Final    Interpretation/Result 10/19/2023 Negative for intraepithelial lesion or malignancy  Negative for intraepithelial lesion or malignancy Final    Specimen Adequacy 10/19/2023 Satisfactory for evaluation. Endocervical or metaplastic cells present   Final    General Categorization 10/19/2023 Negative for intraepithelial lesion or malignancy     Final    HPV High Risk mRNA 10/19/2023    Final                    Value:This result contains rich text formatting which cannot be displayed here.    Recommendations/Comments 10/19/2023    Final                    Value:This result contains rich text formatting which cannot be displayed here.    Procedure 10/19/2023    Final                    Value:This result contains rich text formatting which cannot be displayed here.    Clinical Information 10/19/2023    Final                    Value:This result contains rich text formatting which cannot be displayed here.    Reason for testing 10/19/2023 Screening   Final    Gyn Additional Information 10/19/2023    Final                    Value:This result contains rich text formatting which cannot be displayed here.    Case Report 10/19/2023    Final                    Value:Gynecologic Cytology                               Case: [id number]                                  Authorizing Provider:  Kristal Dos Santos MD         Collected:           10/19/2023 09:22 AM          Ordering Location:     [facility]    Received:            10/20/2023 01:44 PM                                 [facility] - OB/GYN                                                             First Screen:          Maryse Manzano                                                               Specimen:    ThinPrep Imager Screening Pap, Cervical/endocervical                                      ]    PROCEDURE:  The procedure, risks, benefits and alternatives were discussed with the patient.  Patient verbalized understanding and gave consent.  The Left knee was prepped and draped in the usual sterile fashion. Using a linear ultrasound probe, the superolateral patella-femoral joint space was visualized. Using a 30-gauge, 1/2-inch needle, 1 cc of 1% lidocaine was used to numb the skin and soft tissues in the supero-lateral approach.  The intra-articular space was then accessed using an 18-gauge, 1-1/2-inch needle, which was visualized on ultrasound, using approximately 5 cc of 1% lidocaine to numb the soft tissue.  Once the intra-articular space was visualized on ultrasound, with the needle accessing the space, the syringe was traded for an empty 10 cc syringe and aspiration was attempted. 0 cc of serous straw colored fluid was removed from the Left knee. The Monovisc injection was attached to the needle and injected. During the injection, the Monovisc was noted to enter the intra-articular space. The Monivisc syringe was then switched out for a syringe containing 2 cc of 1% lidocaine and 1 cc of 40 mg/cc triamcinolone which was injected into the same space. The needle was then removed and hemostasis was achieved.  Skin was cleansed and a dry dressing was  placed.    Patient tolerated the procedure well and no immediate complications noted.       Patient verbalized understanding of assessment and plan.  Patient is in agreement with the plan.  All questions were answered.  No barriers to learning identified. Permanent pictures were saved in our PACS systeme.       INSTRUCTIONS GIVEN TO PATIENT:    \"You will see an effect in the next 2-3 days.  Please contact me if you have fevers, worsening swelling, worsening pain, decreased range of motion, increased redness, chills, or anything that makes you concerned about how the joint we injected feels/looks.  If you do not reach me in a reasonable time, please report directly to the emergency room for further evaluation\"    2 months    Alex B. Behar MD, [facility] & CAM  Physical Medicine and Rehabilitation/Sports Medicine  [facility]

## 2024-04-11 ENCOUNTER — OFFICE VISIT (OUTPATIENT)
Dept: OBGYN CLINIC | Facility: CLINIC | Age: 54
End: 2024-04-11
Payer: COMMERCIAL

## 2024-04-11 ENCOUNTER — HOSPITAL ENCOUNTER (OUTPATIENT)
Dept: ULTRASOUND IMAGING | Facility: HOSPITAL | Age: 54
Discharge: HOME | End: 2024-04-11
Attending: OBSTETRICS & GYNECOLOGY
Payer: COMMERCIAL

## 2024-04-11 VITALS
HEART RATE: 63 BPM | BODY MASS INDEX: 24 KG/M2 | DIASTOLIC BLOOD PRESSURE: 79 MMHG | WEIGHT: 142 LBS | SYSTOLIC BLOOD PRESSURE: 114 MMHG

## 2024-04-11 DIAGNOSIS — Z85.3 PERSONAL HISTORY OF BREAST CANCER: ICD-10-CM

## 2024-04-11 DIAGNOSIS — N95.0 POSTMENOPAUSAL BLEEDING: Primary | ICD-10-CM

## 2024-04-11 DIAGNOSIS — N95.0 POSTMENOPAUSAL BLEEDING: ICD-10-CM

## 2024-04-11 PROCEDURE — 76856 US EXAM PELVIC COMPLETE: CPT | Performed by: OBSTETRICS & GYNECOLOGY

## 2024-04-11 PROCEDURE — 99213 OFFICE O/P EST LOW 20 MIN: CPT | Performed by: OBSTETRICS & GYNECOLOGY

## 2024-04-11 PROCEDURE — 76830 TRANSVAGINAL US NON-OB: CPT | Performed by: OBSTETRICS & GYNECOLOGY

## 2024-04-11 NOTE — TELEPHONE ENCOUNTER
Rcvd letter of approval from [employer] via [employer] re:    Monovisc        Procedure code       Placed in Neuro bin

## 2024-04-11 NOTE — PROGRESS NOTES
Shalini Cruz is a 53 year old female  Patient's last menstrual period was 2024 (exact date).   Chief Complaint   Patient presents with    Menstrual Problem     Pt had round of bleeding back in March, was advised to come in. On letrozole for hx breast cancer. Hot flashes resolved 2 weeks prior & got menstrual migraine so wondering if just period again. Hx prior embx in office that was intolerable for pain -- will not do in office again   .     OBSTETRICS HISTORY:  OB History    Para Term  AB Living   0 0 0 0 0 0   SAB IAB Ectopic Multiple Live Births   0 0 0 0 0       GYNE HISTORY:  Periods none due to menopause    History   Sexual Activity    Sexual activity: Yes       MEDICAL HISTORY:  Past Medical History:    Back problem    herniated disc    Breast cancer (HCC)    left breast    Cataract    Colon polyps    Diverticulosis    Esophageal reflux    Gastritis    Hiatal hernia    Hx of motion sickness    Microscopic hematuria    Migraines    menstrual    PONV (postoperative nausea and vomiting)     Past Surgical History:   Procedure Laterality Date    Breast reconstruction  2022    Colonoscopy      Colonoscopy N/A 2018    Procedure: COLONOSCOPY;  Surgeon: John Silva MD;  Location: [facility] ENDOSCOPY    Colonoscopy N/A 2023    Procedure: COLONOSCOPY;  Surgeon: John Silva MD;  Location: [facility] ENDO    Cyst aspiration left  2016    Sheila biopsy stereo nodule 1 site left (cpt=19081)  10/19/2021    2 site left calcs top hat and cork    Mastectomy left  2021    Mastectomy right  2021    Other      Rhinoplasty    Removal of ovarian cyst(s)      laparascopic right ov cystectomy, paratubal cyst drainage     OB History    Para Term  AB Living   0 0 0 0 0 0   SAB IAB Ectopic Multiple Live Births   0 0 0 0 0        SOCIAL HISTORY:  Social History     Socioeconomic History    Marital status: Unknown     Spouse name: Not on file    Number of children: Not on  file    Years of education: Not on file    Highest education level: Not on file   Occupational History    Not on file   Tobacco Use    Smoking status: Never    Smokeless tobacco: Never   Vaping Use    Vaping status: Never Used   Substance and Sexual Activity    Alcohol use: Yes     Alcohol/week: 5.0 standard drinks of alcohol     Types: 3 Glasses of wine, 2 Shots of liquor per week     Comment: occasional    Drug use: Never    Sexual activity: Yes   Other Topics Concern     Service Not Asked    Blood Transfusions Not Asked    Caffeine Concern Yes     Comment: coffee    Occupational Exposure Not Asked    Hobby Hazards Not Asked    Sleep Concern Not Asked    Stress Concern Not Asked    Weight Concern Not Asked    Special Diet Not Asked    Back Care Not Asked    Exercise Not Asked    Bike Helmet Not Asked    Seat Belt Not Asked    Self-Exams Not Asked   Social History Narrative    Not on file     Social Determinants of Health     Financial Resource Strain: Not on file   Food Insecurity: Not on file   Transportation Needs: Not on file   Physical Activity: Not on file   Stress: Not on file   Social Connections: Not on file   Housing Stability: Not on file       MEDICATIONS:    Current Outpatient Medications:     NAPROXEN 500 MG Oral Tab, TAKE 1 TABLET (500 MG TOTAL) BY MOUTH 2 (TWO) TIMES DAILY WITH MEALS. TAKE FOR 2 WEEKS AS DIRECTED AND THEN AS NEEDED., Disp: 60 tablet, Rfl: 0    methylPREDNISolone (MEDROL) 4 MG Oral Tablet Therapy Pack, As directed, Disp: 2 each, Rfl: 0    cyclobenzaprine 5 MG Oral Tab, 5 mg 1-2 tablets three times per day as needed for spasms. Do not operate heavy machinery while on this medication as it may make you sleepy, Disp: 90 tablet, Rfl: 0    Omega-3 Fatty Acids (OMEGA 3 500 OR), Take by mouth., Disp: , Rfl:     letrozole 2.5 MG Oral Tab, Take 1 tablet (2.5 mg total) by mouth daily., Disp: 90 tablet, Rfl: 3    Cholecalciferol (VITAMIN D) 50 MCG (2000 UT) Oral Tab, Take by mouth.,  Disp: , Rfl:     Docusate Sodium (STOOL SOFTENER OR), Take by mouth every other day., Disp: , Rfl:     Biotin 1000 MCG Oral Tab, Take 1,000 mcg by mouth daily., Disp: , Rfl:     Ascorbic Acid (VITAMIN C) 1000 MG Oral Tab, Take 1 tablet (1,000 mg total) by mouth daily., Disp: , Rfl:     Glucosamine-Chondroit-Vit C-Mn (GLUCOSAMINE 1500 COMPLEX OR), , Disp: , Rfl:     ALLERGIES:    Allergies   Allergen Reactions    Iodine [Radiology Contrast Iodinated Dyes] HIVES and RASH    Penicillins HIVES and RASH         Review of Systems:  Constitutional:    denies fatigue, night sweats, hot flashes  Cardiovascular:   denies chest pain or palpitations  Respiratory:    denies shortness of breath  Gastrointestinal:   denies heartburn, abdominal pain, diarrhea or constipation  Genitourinary:    denies dysuria, incontinence, abnormal vaginal discharge, vaginal itching  Musculoskeletal:   denies back pain.  Skin/Breast:    denies any breast pain, lumps, or discharge.   Neurological:    denies headaches, extremity weakness or numbness.  Psychiatric:   denies depression or anxiety.  Endocrine:     denies excessive thirst or urination.  Heme/Lymph:    denies history of anemia, easy bruising or bleeding.      PHYSICAL EXAM:   /79   Pulse 63   Wt 142 lb (64.4 kg)   LMP 03/14/2024 (Exact Date)   BMI 24.37 kg/m²   Constitutional:   well developed, well nourished  Head/Face:  normocephalic  Abdomen:    soft, nontender, nondistended, no masses  Skin/Hair:   no unusual rashes or bruises  Extremities:   no edema, no cyanosis  Psychiatric:    oriented to time, place, person and situation. Appropriate mood and affect    Pelvic Exam:  External Genitalia:  normal appearance, hair distribution, and no lesions  Urethral Meatus:   normal in size, location, without lesions and prolapse  Bladder:    no fullness, masses or tenderness  Vagina:    normal appearance without lesions, no abnormal discharge  Cervix:    normal without tenderness on  motion  Uterus:   normal in size, contour, position, mobility, without tenderness  Adnexa:   normal without masses or tenderness  Perineum:   normal  Anus:    no hemorroids   Lymph node:   no inguinal lymph nodes    Assessment & Plan:    Shalini was seen today for menstrual problem.    Diagnoses and all orders for this visit:    Postmenopausal bleeding  -     US PELVIS W EV (CPT=76856/01600); Future    Personal history of breast cancer        Requested Prescriptions      No prescriptions requested or ordered in this encounter       Reviewed need for repeat evaluation since  bleed. Can do as hysteroscopy under anesth.  Check ultrasound to r/o any other causes such as ovarian cyst.  Will schedule once results of ultrasound given.  Will plan for hysteroscopic currettage, possible polypectomy      Spent total time 20 minutes on obtaining history / chart review, evaluating patient / performing medically appropriate exam, discussing treatment options, counseling / educating, and completing documentation, coordinating care.

## 2024-04-15 ENCOUNTER — PATIENT MESSAGE (OUTPATIENT)
Dept: OBGYN CLINIC | Facility: CLINIC | Age: 54
End: 2024-04-15

## 2024-04-15 ENCOUNTER — TELEPHONE (OUTPATIENT)
Dept: OBGYN CLINIC | Facility: CLINIC | Age: 54
End: 2024-04-15

## 2024-04-15 DIAGNOSIS — N95.0 PMB (POSTMENOPAUSAL BLEEDING): Primary | ICD-10-CM

## 2024-04-15 RX ORDER — MISOPROSTOL 200 UG/1
400 TABLET ORAL ONCE
Qty: 2 TABLET | Refills: 0 | Status: SHIPPED | OUTPATIENT
Start: 2024-04-15 | End: 2024-04-15

## 2024-04-15 NOTE — TELEPHONE ENCOUNTER
From: Shalini Cruz  To: Kristal Dos Santos  Sent: 4/15/2024 11:51 AM CDT  Subject: Follow Up Post Menopausal Period - Ultrasound Results    Galen Dos Santos,    Per our discussion about bleeding after post menopause, I recently did the ultrasound. Please review results. You wanted me to follow up and schedule a hysteroscopic curettage procedure. It appears that nothing remarkable was found on the ultrasound. If you still think I need this procedure please advise and I will call to set it up. Or, should I wait and see if I have another bleeding episode. Could this bleeding episode just be caused by my low estrogen levels due to taking tamoxifen and now letrozole?     Thank you,  Shalini Cruz  [phone]

## 2024-04-15 NOTE — TELEPHONE ENCOUNTER
Spoke to pt. Aware surgery is scheduled on Tues,04/30/2024 at 730am. Cytotec instructions provided    Per Availity, No PA is needed for surgery.  Transaction ID: Not FoundCustomer ID: [id number] Date: NA      Minor case instructions sent via [employer]    To RN: please place Cytotec Rx, confirmed with pt pharmacy on file is correct    Delayed staff message sent to MD to please place pre-op orders    Entered in book and calendar

## 2024-04-15 NOTE — TELEPHONE ENCOUNTER
Please schedule the following surgery:    Procedure: hysteroscopic currettage, possible polypectomy    Date: per pt wish    Diagnosis:  bleed    Admission:Day surgery    Anesth: general    Preop Medical Clearance needed:  No    PCN allergy:  no antibiotic needed    Additional Orders:  needs cytotec 400 mcg night before    Additional equipment:  Truclear    Rep needed: Yes    Additional surgery time needed: No    IPA tubal / hyst form signed: not applicable    Comments / Orders to Nurse: cytotec

## 2024-04-15 NOTE — TELEPHONE ENCOUNTER
Kristal Dos Santos MD  4/15/2024 11:58 AM CDT       Inform pt ultrasound negative -- ovaries not seen but that also means no large cysts. Please to proceed w/ hysteroscopic currettage       Pt informed of NJGs recs via my chart.

## 2024-04-29 NOTE — DISCHARGE INSTRUCTIONS
HOME INSTRUCTIONS      What happens after hysteroscopy?  You may have cramps and bleeding for short time after the procedure. This is normal. Use pads instead of tampons.  Don't douche or use tampons until your healthcare provider says it’s OK.  Don't use any vaginal medicines until you are told it’s OK.  Ask your healthcare provider when it’s OK to have sex again.    When to call your healthcare provider  Call your healthcare provider if any of the following occur:  Heavy bleeding (more than 1 pad an hour for 2 or more hours)  A fever of 100.4°F ( 38.0°C) or higher, or as directed by your provider  Increasing belly (abdominal) pain or soreness  Bad-smelling discharge  Follow-up care  Schedule a follow-up visit with your healthcare provider. Based on your test results, you may need more treatment. Be sure to follow directions and keep your appointments.  See Dr. Dos Santos in 2-3 weeks    Over the counter medication for pain and cramping.  Tylenol or motrin.  Next motrin will be due at 2pm.  May use heat packs for cramping.      AMBSURG HOME CARE INSTRUCTIONS: POST-OP ANESTHESIA  The medication that you received for sedation or general anesthesia can last up to 24 hours. Your judgment and reflexes may be altered, even if you feel like your normal self.      We Recommend:   Do not drive any motor vehicle or bicycle   Avoid mowing the lawn, playing sports, or working with power tools/applicances (power saws, electric knives or mixers)   That you have someone stay with you on your first night home   Do not drink alcohol or take sleeping pills or tranquilizers   Do not sign legal documents within 24 hours of your procedure   If you had a nerve block for your surgery, take extra care not to put any pressure on your arm or hand for 24 hours    It is normal:  For you to have a sore throat if you had a breathing tube during surgery (while you were asleep!). The sore throat should get better within 48 hours. You can gargle with  warm salt water (1/2 tsp in 4 oz warm water) or use a throat lozenge for comfort  To feel muscle aches or soreness especially in the abdomen, chest or neck. The achy feeling should go away in the next 24 hours  To feel weak, sleepy or \"wiped out\". Your should start feeling better in the next 24 hours.   To experience mild discomforts such as sore lip or tongue, headache, cramps, gas pains or a bloated feeling in your abdomen.   To experience mild back pain or soreness for a day or two if you had spinal or epidural anesthesia.   If you had laparoscopic surgery, to feel shoulder pain or discomfort on the day of surgery.   For some patients to have nausea after surgery/anesthesia    If you feel nausea or experience vomiting:   Try to move around less.   Eat less than usual or drink only liquids until the next morning   Nausea should resolve in about 24 hours    If you have a problem when you are at home:    Call your surgeons office   Discharge Instructions: After Your Surgery  You’ve just had surgery. During surgery, you were given medicine called anesthesia to keep you relaxed and free of pain. After surgery, you may have some pain or nausea. This is common. Here are some tips for feeling better and getting well after surgery.   Going home  Your healthcare provider will show you how to take care of yourself when you go home. They'll also answer your questions. Have an adult family member or friend drive you home. For the first 24 hours after your surgery:   Don't drive or use heavy equipment.  Don't make important decisions or sign legal papers.  Take medicines as directed.  Don't drink alcohol.  Have someone stay with you, if needed. They can watch for problems and help keep you safe.  Be sure to go to all follow-up visits with your healthcare provider. And rest after your surgery for as long as your provider tells you to.   Coping with pain  If you have pain after surgery, pain medicine will help you feel better.  Take it as directed, before pain becomes severe. Also, ask your healthcare provider or pharmacist about other ways to control pain. This might be with heat, ice, or relaxation. And follow any other instructions your surgeon or nurse gives you.      Stay on schedule with your medicine.     Tips for taking pain medicine  To get the best relief possible, remember these points:   Pain medicines can upset your stomach. Taking them with a little food may help.  Most pain relievers taken by mouth need at least 20 to 30 minutes to start to work.  Don't wait till your pain becomes severe before you take your medicine. Try to time your medicine so that you can take it before starting an activity. This might be before you get dressed, go for a walk, or sit down for dinner.  Constipation is a common side effect of some pain medicines. Call your healthcare provider before taking any medicines such as laxatives or stool softeners to help ease constipation. Also ask if you should skip any foods. Drinking lots of fluids and eating foods such as fruits and vegetables that are high in fiber can also help. Remember, don't take laxatives unless your surgeon has prescribed them.  Drinking alcohol and taking pain medicine can cause dizziness and slow your breathing. It can even be deadly. Don't drink alcohol while taking pain medicine.  Pain medicine can make you react more slowly to things. Don't drive or run machinery while taking pain medicine.  Your healthcare provider may tell you to take acetaminophen to help ease your pain. Ask them how much you're supposed to take each day. Acetaminophen or other pain relievers may interact with your prescription medicines or other over-the-counter (OTC) medicines. Some prescription medicines have acetaminophen and other ingredients in them. Using both prescription and OTC acetaminophen for pain can cause you to accidentally overdose. Read the labels on your OTC medicines with care. This will  help you to clearly know the list of ingredients, how much to take, and any warnings. It may also help you not take too much acetaminophen. If you have questions or don't understand the information, ask your pharmacist or healthcare provider to explain it to you before you take the OTC medicine.   Managing nausea  Some people have an upset stomach (nausea) after surgery. This is often because of anesthesia, pain, or pain medicine, less movement of food in the stomach, or the stress of surgery. These tips will help you handle nausea and eat healthy foods as you get better. If you were on a special food plan before surgery, ask your healthcare provider if you should follow it while you get better. Check with your provider on how your eating should progress. It may depend on the surgery you had. These general tips may help:   Don't push yourself to eat. Your body will tell you when to eat and how much.  Start off with clear liquids and soup. They're easier to digest.  Next try semi-solid foods as you feel ready. These include mashed potatoes, applesauce, and gelatin.  Slowly move to solid foods. Don’t eat fatty, rich, or spicy foods at first.  Don't force yourself to have 3 large meals a day. Instead eat smaller amounts more often.  Take pain medicines with a small amount of solid food, such as crackers or toast. This helps prevent nausea.  When to call your healthcare provider  Call your healthcare provider right away if you have any of these:   You still have too much pain, or the pain gets worse, after taking the medicine. The medicine may not be strong enough. Or there may be a complication from the surgery.  You feel too sleepy, dizzy, or groggy. The medicine may be too strong.  Side effects such as nausea or vomiting. Your healthcare provider may advise taking other medicines to .  Skin changes such as rash, itching, or hives. This may mean you have an allergic reaction. Your provider may advise taking other  medicines.  The incision looks different (for instance, part of it opens up).  Bleeding or fluid leaking from the incision site, and weren't told to expect that.  Fever of 100.4°F (38°C) or higher, or as directed by your provider.  Call 911  Call 911 right away if you have:   Trouble breathing  Facial swelling    If you have obstructive sleep apnea   You were given anesthesia medicine during surgery to keep you comfortable and free of pain. After surgery, you may have more apnea spells because of this medicine and other medicines you were given. The spells may last longer than normal.    At home:  Keep using the continuous positive airway pressure (CPAP) device when you sleep. Unless your healthcare provider tells you not to, use it when you sleep, day or night. CPAP is a common device used to treat obstructive sleep apnea.  Talk with your provider before taking any pain medicine, muscle relaxants, or sedatives. Your provider will tell you about the possible dangers of taking these medicines.  Contact your provider if your sleeping changes a lot even when taking medicines as directed.  [employer] last reviewed this educational content on 10/1/2021  © 7355-9160 [employer]. All rights reserved. This information is not intended as a substitute for professional medical care. Always follow your healthcare professional's instructions.

## 2024-04-30 ENCOUNTER — ANESTHESIA (OUTPATIENT)
Dept: SURGERY | Facility: HOSPITAL | Age: 54
End: 2024-04-30
Payer: COMMERCIAL

## 2024-04-30 ENCOUNTER — ANESTHESIA EVENT (OUTPATIENT)
Dept: SURGERY | Facility: HOSPITAL | Age: 54
End: 2024-04-30
Payer: COMMERCIAL

## 2024-04-30 ENCOUNTER — HOSPITAL ENCOUNTER (OUTPATIENT)
Facility: HOSPITAL | Age: 54
Setting detail: HOSPITAL OUTPATIENT SURGERY
Discharge: HOME | End: 2024-04-30
Attending: OBSTETRICS & GYNECOLOGY | Admitting: OBSTETRICS & GYNECOLOGY
Payer: COMMERCIAL

## 2024-04-30 VITALS
SYSTOLIC BLOOD PRESSURE: 104 MMHG | OXYGEN SATURATION: 98 % | HEART RATE: 62 BPM | WEIGHT: 145 LBS | HEIGHT: 64 IN | DIASTOLIC BLOOD PRESSURE: 64 MMHG | RESPIRATION RATE: 16 BRPM | BODY MASS INDEX: 24.75 KG/M2 | TEMPERATURE: 98 F

## 2024-04-30 DIAGNOSIS — N95.0 PMB (POSTMENOPAUSAL BLEEDING): ICD-10-CM

## 2024-04-30 PROCEDURE — 58558 HYSTEROSCOPY BIOPSY: CPT | Performed by: OBSTETRICS & GYNECOLOGY

## 2024-04-30 PROCEDURE — 0UB98ZX EXCISION OF UTERUS, VIA NATURAL OR ARTIFICIAL OPENING ENDOSCOPIC, DIAGNOSTIC: ICD-10-PCS | Performed by: OBSTETRICS & GYNECOLOGY

## 2024-04-30 RX ORDER — SODIUM CHLORIDE, SODIUM LACTATE, POTASSIUM CHLORIDE, CALCIUM CHLORIDE 600; 310; 30; 20 MG/100ML; MG/100ML; MG/100ML; MG/100ML
INJECTION, SOLUTION INTRAVENOUS CONTINUOUS
Status: DISCONTINUED | OUTPATIENT
Start: 2024-04-30 | End: 2024-04-30

## 2024-04-30 RX ORDER — KETOROLAC TROMETHAMINE 30 MG/ML
INJECTION, SOLUTION INTRAMUSCULAR; INTRAVENOUS AS NEEDED
Status: DISCONTINUED | OUTPATIENT
Start: 2024-04-30 | End: 2024-04-30 | Stop reason: SURG

## 2024-04-30 RX ORDER — NALOXONE HYDROCHLORIDE 0.4 MG/ML
0.08 INJECTION, SOLUTION INTRAMUSCULAR; INTRAVENOUS; SUBCUTANEOUS AS NEEDED
Status: DISCONTINUED | OUTPATIENT
Start: 2024-04-30 | End: 2024-04-30

## 2024-04-30 RX ORDER — HYDROMORPHONE HYDROCHLORIDE 1 MG/ML
0.4 INJECTION, SOLUTION INTRAMUSCULAR; INTRAVENOUS; SUBCUTANEOUS EVERY 5 MIN PRN
Status: DISCONTINUED | OUTPATIENT
Start: 2024-04-30 | End: 2024-04-30

## 2024-04-30 RX ORDER — ONDANSETRON 2 MG/ML
4 INJECTION INTRAMUSCULAR; INTRAVENOUS EVERY 6 HOURS PRN
Status: DISCONTINUED | OUTPATIENT
Start: 2024-04-30 | End: 2024-04-30

## 2024-04-30 RX ORDER — LIDOCAINE HYDROCHLORIDE 10 MG/ML
INJECTION, SOLUTION EPIDURAL; INFILTRATION; INTRACAUDAL; PERINEURAL AS NEEDED
Status: DISCONTINUED | OUTPATIENT
Start: 2024-04-30 | End: 2024-04-30 | Stop reason: SURG

## 2024-04-30 RX ORDER — MORPHINE SULFATE 10 MG/ML
6 INJECTION, SOLUTION INTRAMUSCULAR; INTRAVENOUS EVERY 10 MIN PRN
Status: DISCONTINUED | OUTPATIENT
Start: 2024-04-30 | End: 2024-04-30

## 2024-04-30 RX ORDER — PROCHLORPERAZINE EDISYLATE 5 MG/ML
5 INJECTION INTRAMUSCULAR; INTRAVENOUS EVERY 8 HOURS PRN
Status: DISCONTINUED | OUTPATIENT
Start: 2024-04-30 | End: 2024-04-30

## 2024-04-30 RX ORDER — ONDANSETRON 2 MG/ML
INJECTION INTRAMUSCULAR; INTRAVENOUS AS NEEDED
Status: DISCONTINUED | OUTPATIENT
Start: 2024-04-30 | End: 2024-04-30 | Stop reason: SURG

## 2024-04-30 RX ORDER — HYDROMORPHONE HYDROCHLORIDE 1 MG/ML
0.6 INJECTION, SOLUTION INTRAMUSCULAR; INTRAVENOUS; SUBCUTANEOUS EVERY 5 MIN PRN
Status: DISCONTINUED | OUTPATIENT
Start: 2024-04-30 | End: 2024-04-30

## 2024-04-30 RX ORDER — MIDAZOLAM HYDROCHLORIDE 1 MG/ML
INJECTION INTRAMUSCULAR; INTRAVENOUS AS NEEDED
Status: DISCONTINUED | OUTPATIENT
Start: 2024-04-30 | End: 2024-04-30 | Stop reason: SURG

## 2024-04-30 RX ORDER — MORPHINE SULFATE 4 MG/ML
2 INJECTION, SOLUTION INTRAMUSCULAR; INTRAVENOUS EVERY 10 MIN PRN
Status: DISCONTINUED | OUTPATIENT
Start: 2024-04-30 | End: 2024-04-30

## 2024-04-30 RX ORDER — MORPHINE SULFATE 4 MG/ML
4 INJECTION, SOLUTION INTRAMUSCULAR; INTRAVENOUS EVERY 10 MIN PRN
Status: DISCONTINUED | OUTPATIENT
Start: 2024-04-30 | End: 2024-04-30

## 2024-04-30 RX ORDER — HYDROMORPHONE HYDROCHLORIDE 1 MG/ML
0.2 INJECTION, SOLUTION INTRAMUSCULAR; INTRAVENOUS; SUBCUTANEOUS EVERY 5 MIN PRN
Status: DISCONTINUED | OUTPATIENT
Start: 2024-04-30 | End: 2024-04-30

## 2024-04-30 RX ORDER — DEXAMETHASONE SODIUM PHOSPHATE 4 MG/ML
VIAL (ML) INJECTION AS NEEDED
Status: DISCONTINUED | OUTPATIENT
Start: 2024-04-30 | End: 2024-04-30 | Stop reason: SURG

## 2024-04-30 RX ORDER — ACETAMINOPHEN 500 MG
1000 TABLET ORAL ONCE
Status: COMPLETED | OUTPATIENT
Start: 2024-04-30 | End: 2024-04-30

## 2024-04-30 RX ADMIN — DEXAMETHASONE SODIUM PHOSPHATE 4 MG: 4 MG/ML VIAL (ML) INJECTION at 07:39:00

## 2024-04-30 RX ADMIN — ONDANSETRON 4 MG: 2 INJECTION INTRAMUSCULAR; INTRAVENOUS at 07:39:00

## 2024-04-30 RX ADMIN — KETOROLAC TROMETHAMINE 30 MG: 30 INJECTION, SOLUTION INTRAMUSCULAR; INTRAVENOUS at 08:09:00

## 2024-04-30 RX ADMIN — LIDOCAINE HYDROCHLORIDE 50 MG: 10 INJECTION, SOLUTION EPIDURAL; INFILTRATION; INTRACAUDAL; PERINEURAL at 07:45:00

## 2024-04-30 RX ADMIN — SODIUM CHLORIDE, SODIUM LACTATE, POTASSIUM CHLORIDE, CALCIUM CHLORIDE: 600; 310; 30; 20 INJECTION, SOLUTION INTRAVENOUS at 08:32:00

## 2024-04-30 RX ADMIN — MIDAZOLAM HYDROCHLORIDE 2 MG: 1 INJECTION INTRAMUSCULAR; INTRAVENOUS at 07:39:00

## 2024-04-30 NOTE — INTERVAL H&P NOTE
Pre-op Diagnosis: PMB (postmenopausal bleeding) [N95.0]    The above referenced H&P was reviewed by Kristal Dos Santos MD on 4/30/2024, the patient was examined and no significant changes have occurred in the patient's condition since the H&P was performed.  I discussed with the patient and/or legal representative the potential benefits, risks and side effects of this procedure; the likelihood of the patient achieving goals; and potential problems that might occur during recuperation.  I discussed reasonable alternatives to the procedure, including risks, benefits and side effects related to the alternatives and risks related to not receiving this procedure.  We will proceed with procedure as planned.

## 2024-04-30 NOTE — ANESTHESIA PROCEDURE NOTES
Airway  Date/Time: 4/30/2024 7:47 AM  Urgency: Elective    Airway not difficult    General Information and Staff    Patient location during procedure: OR  Anesthesiologist: Chase Liu MD  Resident/CRNA: Teena Alejandre CRNA  Performed: CRNA   Performed by: Teena Alejandre CRNA  Authorized by: Chase Liu MD      Indications and Patient Condition  Indications for airway management: anesthesia  Sedation level: deep  Preoxygenated: yes  Patient position: sniffing  Mask difficulty assessment: 1 - vent by mask    Final Airway Details  Final airway type: supraglottic airway      Successful airway: classic  Size 4 (lubricated)       Number of attempts at approach: 1    Additional Comments  Placed with ease atraumatically. (+) ETCO2 and BBSE. LMA secured.

## 2024-04-30 NOTE — H&P
[facility]  part of [facility]    Gyne History & Physical    Shalini Cruz Patient Status:  Hospital Outpatient Surgery    1970 MRN [id number]   Location [facility] OPERATING ROOM Attending Kristal Dos Santos MD   Hosp Day # 0 PCP Yudy Jaimes MD       Reason for Admission:    bleed    History of Present Illness:   Patient is a(n) 54 year old  female No LMP recorded. Patient is postmenopausal. who presented with  bleed for hysteroscopic evaluation. Intolerant to office embx    Past Medical History  Past Medical History:    Back problem    herniated disc    Breast cancer (HCC)    left breast    Cataract    Colon polyps    Diverticulosis    Esophageal reflux    Gastritis    Hiatal hernia    Hx of motion sickness    Microscopic hematuria    Migraines    menstrual    PONV (postoperative nausea and vomiting)       Past Surgical History  Past Surgical History:   Procedure Laterality Date    Breast reconstruction  2022    Colonoscopy      Colonoscopy N/A 2018    Procedure: COLONOSCOPY;  Surgeon: John Silva MD;  Location: [facility] ENDOSCOPY    Colonoscopy N/A 2023    Procedure: COLONOSCOPY;  Surgeon: John Silva MD;  Location: [facility] ENDO    Cyst aspiration left  2016    [facility] biopsy stereo nodule 1 site left (cpt=19081)  10/19/2021    2 site left calcs top hat and cork    Mastectomy left Left 2021    One Lymph Node Removed    Mastectomy right  2021    Other      Rhinoplasty    Removal of ovarian cyst(s)      laparascopic right ov cystectomy, paratubal cyst drainage       Family History  Family History   Problem Relation Age of Onset    Breast Cancer Mother 75    Neurological Disorder Mother         Cerebral Aneurysm    Dementia Mother         Due to brain aneurysm 20 years prior    Heart Disease Father         CAD    Prostate Cancer Father 85    Diabetes Brother     Schizophrenia Brother     Other (cerebral hemmorage) Brother     Breast  Cancer Paternal Aunt 84    No Known Problems Sister     Colon Cancer Paternal Grandmother     Colon Cancer Paternal Cousin Male 57       Social History  Social History     Socioeconomic History    Marital status: Unknown   Tobacco Use    Smoking status: Never    Smokeless tobacco: Never   Vaping Use    Vaping status: Never Used   Substance and Sexual Activity    Alcohol use: Yes     Alcohol/week: 5.0 standard drinks of alcohol     Types: 3 Glasses of wine, 2 Shots of liquor per week     Comment: occasional    Drug use: Never    Sexual activity: Yes   Other Topics Concern    Caffeine Concern Yes     Comment: coffee           Past OB History:  OB History    Para Term  AB Living   0 0 0 0 0 0   SAB IAB Ectopic Multiple Live Births   0 0 0 0 0        Past GYN History:   Periods: none  Hx STD: none  BCM:     Medications / Allergies:   Outpatient meds:     Current Outpatient Medications:     NAPROXEN 500 MG Oral Tab, TAKE 1 TABLET (500 MG TOTAL) BY MOUTH 2 (TWO) TIMES DAILY WITH MEALS. TAKE FOR 2 WEEKS AS DIRECTED AND THEN AS NEEDED., Disp: 60 tablet, Rfl: 0    cyclobenzaprine 5 MG Oral Tab, 5 mg 1-2 tablets three times per day as needed for spasms. Do not operate heavy machinery while on this medication as it may make you sleepy, Disp: 90 tablet, Rfl: 0    Omega-3 Fatty Acids (OMEGA 3 500 OR), Take by mouth., Disp: , Rfl:     letrozole 2.5 MG Oral Tab, Take 1 tablet (2.5 mg total) by mouth daily. (Patient taking differently: Take 1 tablet (2.5 mg total) by mouth every morning.), Disp: 90 tablet, Rfl: 3    Cholecalciferol (VITAMIN D) 50 MCG (2000 UT) Oral Tab, Take by mouth., Disp: , Rfl:     Docusate Sodium (STOOL SOFTENER OR), Take by mouth every other day., Disp: , Rfl:     Biotin 1000 MCG Oral Tab, Take 1,000 mcg by mouth daily., Disp: , Rfl:     Ascorbic Acid (VITAMIN C) 1000 MG Oral Tab, Take 1 tablet (1,000 mg total) by mouth daily., Disp: , Rfl:     Current Inpatient Medications:  No current  facility-administered medications for this encounter.       Allergies  Allergies   Allergen Reactions    Iodine [Radiology Contrast Iodinated Dyes] HIVES and RASH    Penicillins HIVES and RASH     No skin blisters or organ involvement       Review of Systems:      10 point ROS completed and was negative, except for pertinent positive and negatives stated in subjective.    Physical Exam:   Vital Signs:  No data found.    No data recorded.        General: No acute distress. Alert and oriented x 3.  HEENT: Moist mucous membranes.   Respiratory: Nonlabored breathing  Cardiovascular: Regular rate    Abdomen: Soft, nontender, nondistended.    Pelvic: defer to OR  Musculoskeletal: No swelling noted.  Integument: No lesions. No unusual erythema.  Psychiatric: Appropriate mood and affect.    Results:     Laboratory Data:  Lab Results   Component Value Date    WBC 7.5 08/30/2023    HGB 15.3 08/30/2023    HCT 45.3 08/30/2023    .0 08/30/2023    CREATSERUM 0.74 08/30/2023    BUN 16 08/30/2023     08/30/2023    K 4.2 08/30/2023     08/30/2023    CO2 29.0 08/30/2023     (H) 08/30/2023    CA 9.4 08/30/2023    ALB 4.0 08/30/2023    ALKPHO 59 08/30/2023    BILT 0.5 08/30/2023    TP 7.6 08/30/2023    AST 21 08/30/2023    ALT 32 08/30/2023    TSH 2.050 08/30/2023    ESRML 1 08/30/2023    CRP <0.29 08/30/2023    B12 287 08/30/2023       No results for input(s): \"RBC\", \"HGB\", \"HCT\", \"MCV\", \"MCH\", \"MCHC\", \"RDW\", \"NEPRELIM\", \"WBC\", \"PLT\" in the last 168 hours.  No results for input(s): \"GLU\", \"BUN\", \"CREATSERUM\", \"GFRAA\", \"GFRNAA\", \"CA\", \"ALB\", \"NA\", \"K\", \"CL\", \"CO2\", \"ALKPHO\", \"AST\", \"ALT\", \"BILT\", \"TP\" in the last 168 hours.  No results found for: \"PT\", \"INR\"    Culture:  No results found for this visit on 04/30/24.      Imaging:  US PELVIS W EV (CPT=76856/30635)    Result Date: 4/11/2024  PROCEDURE: US PELVIS W EV (CPT=76856/36960)  COMPARISON: [facility], US PF PELVIS AND  RANDEE, 9/01/2009, 1:40 PM.  INDICATIONS: Postmenopausal bleeding  TECHNIQUE: Pelvic ultrasound using transabdominal and transvaginal technique.  A transvaginal scan was performed for endometrial and adnexal evaluation  FINDINGS:   UTERUS:   Measures 6.1 x 2.7 x 3.7 cm  ENDOMETRIUM: Endometrial thickness 2.8 mm.  Small nabothian cysts within the cervix MYOMETRIUM: Normal echogenicity.  No masses.   OVARIES AND ADNEXA:  Bilateral ovaries obscured by atrophic changes versus intervening bowel gas   RIGHT:   Nonvisualized right ovary LEFT:   Nonvisualized left ovary  CUL-DE-SAC:   Normal.  No free fluid or mass.  OTHER: Negative.  Bladder appears normal.          CONCLUSION:   1. Normal uterine sonogram.  2. Normal thickness endometrium measures 2.8 mm. 3. Neither ovary visualized either markedly atrophic or obscured by bowel    Dictated by (CST): Destin Segura MD on 4/11/2024 at 3:03 PM     Finalized by (CST): Destin Segura MD on 4/11/2024 at 3:06 PM              Impression:      bleed      Recommendations:  Plan for hysteroscopic currettage, possible polypectomy  Cytotec night before.      Kristal Dos Santos MD  4/29/2024

## 2024-04-30 NOTE — OPERATIVE REPORT
[facility]  part of [facility] Detailed Operative Note    Shalini Cruz Patient Status:  Hospital Outpatient Surgery    1970 MRN [id number]   Location [facility] OPERATING ROOM Attending Kristal Dos Santos MD   Hosp Day # 0 PCP Yudy Jaimes MD     Preoperative Diagnosis: PMB (postmenopausal bleeding) [N95.0]    Postoperative Diagnosis:  PMB (postmenopausal bleeding) [N95.0], endometrial polyp    Procedures:    Operative Hysteroscopic polypectomy    Primary Surgeon:   Kristal Dos Santos MD    Assistant:    none    Anesthesia:    General    Estimated Blood Loss:  Less than 5 CC    Antibiotics:     none    Complications:   none    Specimens:    Endometrial polyp      Surgical Findings:   Flat polyp near right cornua    Condition: stable    Procedure Note:  Patient taken to the OR where she was placed under general anesthesia, prepped and draped in the normal usual sterile manner in dorsal lithotomy position.  Bladder was emptied via straight catheter.  Pelvic exam done which confirmed anterverted uterus. Weighted speculum placed in posterior vagina. Cervix grasped with single tooth tenaculum at 12 o'clock position.  Cervix dilated using #7 Monika dilators. Truclear hysteroscope placed into cavity.  Inspection revealed normal ostia, cavity with  flat polyps.  Truclear device used to remove polyps in toto & without difficulty. Hysteroscope was removed. Single tooth tenaculum was removed. AgNO3 used to make site of application hemostatic.  Patient was taken to the recovery room in good condition.  All sponge, needle count were correct x 2.      Kristal Dos Santos MD  2024

## 2024-04-30 NOTE — ANESTHESIA PREPROCEDURE EVALUATION
Anesthesia PreOp Note    HPI:     Shalini Cruz is a 54 year old female who presents for preoperative consultation requested by: Kristal Dos Santos MD    Date of Surgery: 4/30/2024    Procedure(s):  Hysteroscopic curettage, possible polypectomy  Indication: PMB (postmenopausal bleeding) [N95.0]    Relevant Problems   No relevant active problems       NPO:  Last Liquid Consumption Date: 04/29/24  Last Liquid Consumption Time: 1900  Last Solid Consumption Date: 04/29/24  Last Solid Consumption Time: 1900  Last Liquid Consumption Date: 04/29/24          History Review:  Patient Active Problem List    Diagnosis Date Noted    Encounter for monitoring tamoxifen therapy 06/30/2022    Absence of breast, bilateral 12/10/2021    Malignant neoplasm of upper-outer quadrant of left breast in female, estrogen receptor positive (HCC) 10/29/2021    Breast pain 07/30/2018    Fibrocystic breast disease (FCBD) 07/30/2018    Adnexal tenderness, right 05/18/2017    Dysfunctional uterine bleeding 05/16/2016    Enlarged ovary 05/16/2016    Microscopic hematuria 04/06/2015    Allergy to contrast media (used for diagnostic x-rays) 04/06/2015    Urethral syndrome 05/04/2009    Nocturia 03/06/2009       Past Medical History:    Back problem    herniated disc    Breast cancer (HCC)    left breast    Cataract    Colon polyps    Diverticulosis    Esophageal reflux    Gastritis    Hiatal hernia    Hx of motion sickness    Microscopic hematuria    Migraines    menstrual    PONV (postoperative nausea and vomiting)       Past Surgical History:   Procedure Laterality Date    Breast reconstruction  02/2022    Colonoscopy      Colonoscopy N/A 06/08/2018    Procedure: COLONOSCOPY;  Surgeon: John Silva MD;  Location: [facility] ENDOSCOPY    Colonoscopy N/A 06/27/2023    Procedure: COLONOSCOPY;  Surgeon: John Silva MD;  Location: [facility] ENDO    Cyst aspiration left  09/2016    Sheila biopsy stereo nodule 1 site left (cpt=19081)  10/19/2021    2 site  left calcs top hat and cork    Mastectomy left Left 12/2021    One Lymph Node Removed    Mastectomy right  12/2021    Other      Rhinoplasty    Removal of ovarian cyst(s)  2009    laparascopic right ov cystectomy, paratubal cyst drainage       Medications Prior to Admission   Medication Sig Dispense Refill Last Dose    miSOPROStol (CYTOTEC) 200 MCG Oral Tab Take 2 tablets (400 mcg total) by mouth one time for 1 dose. Take 2 tabs PO the night before procedure. 2 tablet 0 4/29/2024 at 2000    Omega-3 Fatty Acids (OMEGA 3 500 OR) Take by mouth.   Past Week    letrozole 2.5 MG Oral Tab Take 1 tablet (2.5 mg total) by mouth daily. (Patient taking differently: Take 1 tablet (2.5 mg total) by mouth every morning.) 90 tablet 3 4/30/2024 at 0500    Cholecalciferol (VITAMIN D) 50 MCG (2000 UT) Oral Tab Take by mouth.   Past Week    Docusate Sodium (STOOL SOFTENER OR) Take by mouth every other day.   Past Week    Biotin 1000 MCG Oral Tab Take 1,000 mcg by mouth daily.   Past Week    Ascorbic Acid (VITAMIN C) 1000 MG Oral Tab Take 1 tablet (1,000 mg total) by mouth daily.   Past Week    NAPROXEN 500 MG Oral Tab TAKE 1 TABLET (500 MG TOTAL) BY MOUTH 2 (TWO) TIMES DAILY WITH MEALS. TAKE FOR 2 WEEKS AS DIRECTED AND THEN AS NEEDED. 60 tablet 0 More than a month    cyclobenzaprine 5 MG Oral Tab 5 mg 1-2 tablets three times per day as needed for spasms. Do not operate heavy machinery while on this medication as it may make you sleepy 90 tablet 0 More than a month     Current Facility-Administered Medications Ordered in Epic   Medication Dose Route Frequency Provider Last Rate Last Admin    lactated ringers infusion   Intravenous Continuous Kristal Dos Santos MD        acetaminophen (Tylenol Extra Strength) tab 1,000 mg  1,000 mg Oral Once Kristal Dos Santos MD         No current [facility]-ordered outpatient medications on file.       Allergies   Allergen Reactions    Iodine [Radiology Contrast Iodinated Dyes] HIVES and RASH    Penicillins HIVES  and RASH     No skin blisters or organ involvement       Family History   Problem Relation Age of Onset    Breast Cancer Mother 75    Neurological Disorder Mother         Cerebral Aneurysm    Dementia Mother         Due to brain aneurysm 20 years prior    Heart Disease Father         CAD    Prostate Cancer Father 85    Diabetes Brother     Schizophrenia Brother     Other (cerebral hemmorage) Brother     Breast Cancer Paternal Aunt 84    No Known Problems Sister     Colon Cancer Paternal Grandmother     Colon Cancer Paternal Cousin Male 57     Social History     Socioeconomic History    Marital status: Single   Tobacco Use    Smoking status: Never    Smokeless tobacco: Never   Vaping Use    Vaping status: Never Used   Substance and Sexual Activity    Alcohol use: Yes     Alcohol/week: 5.0 standard drinks of alcohol     Types: 3 Glasses of wine, 2 Shots of liquor per week     Comment: occasional    Drug use: Never    Sexual activity: Yes   Other Topics Concern    Caffeine Concern Yes     Comment: coffee       Available pre-op labs reviewed.             Vital Signs:  Body mass index is 24.89 kg/m².   height is 1.626 m (5' 4\") and weight is 65.8 kg (145 lb). Her oral temperature is 98.3 °F (36.8 °C). Her blood pressure is 114/78 and her pulse is 77. Her respiration is 16 and oxygen saturation is 97%.   Vitals:    04/26/24 1601 04/30/24 0642   BP:  114/78   Pulse:  77   Resp:  16   Temp:  98.3 °F (36.8 °C)   TempSrc:  Oral   SpO2:  97%   Weight: 64.9 kg (143 lb) 65.8 kg (145 lb)   Height: 1.626 m (5' 4\")         Anesthesia Evaluation     Patient summary reviewed and Nursing notes reviewed    History of anesthetic complications (PONV)   Airway   Mallampati: I  TM distance: >3 FB  Neck ROM: full  Dental - Dentition appears grossly intact     Pulmonary - negative ROS and normal exam   Cardiovascular - negative ROS and normal exam  Exercise tolerance: good    Neuro/Psych - negative ROS     GI/Hepatic/Renal    (+) hiatal  hernia, GERD well controlled    Endo/Other - negative ROS     Comments: Left breast cancer  Abdominal  - normal exam                 Anesthesia Plan:   ASA:  2  Plan:   General  Airway:  LMA  Post-op Pain Management: IV analgesics and Oral pain medication  Informed Consent Plan and Risks Discussed With:  Patient  Discussed plan with:  CRNA      I have informed Shalini FITZGERALD Chidiruel of the nature of the anesthetic plan, benefits, risks including possible dental damage if relevant, major complications, and any alternative forms of anesthetic management.   All of the patient's questions were answered to the best of my ability. The patient desires the anesthetic management as planned.  MEEK BARAJAS MD  4/30/2024 6:44 AM  Present on Admission:  **None**

## 2024-04-30 NOTE — ANESTHESIA POSTPROCEDURE EVALUATION
Patient: Shalini Vargasogallo    Procedure Summary       Date: 04/30/24 Room / Location: [facility] MAIN OR 02 / [facility] MAIN OR    Anesthesia Start: 0740 Anesthesia Stop:     Procedure: Operative Hysteroscopic polypectomy (Vagina ) Diagnosis:       PMB (postmenopausal bleeding)      (PMB (postmenopausal bleeding), endometrial polyp [N95.0])    Surgeons: Kristal Dos Santos MD Anesthesiologist: Chase Liu MD    Anesthesia Type: general ASA Status: 2            Anesthesia Type: general    Vitals Value Taken Time   /72 04/30/24 0832   Temp  04/30/24 0833   Pulse 66 04/30/24 0832   Resp 19 04/30/24 0832   SpO2 100 % 04/30/24 0832   Vitals shown include unfiled device data.    [facility] AN Post Evaluation:   Patient Evaluated in PACU  Patient Participation: complete - patient participated  Level of Consciousness: sleepy but conscious  Pain Score: 0  Pain Management: adequate  Airway Patency:patent  Dental exam unchanged from preop    Nausea/Vomiting: none  Cardiovascular Status: hemodynamically stable  Respiratory Status: nasal cannula  Postoperative Hydration acceptable      Teena Alejandre CRNA  4/30/2024 8:33 AM

## 2024-05-09 ENCOUNTER — OFFICE VISIT (OUTPATIENT)
Dept: HEMATOLOGY/ONCOLOGY | Facility: HOSPITAL | Age: 54
End: 2024-05-09
Attending: INTERNAL MEDICINE
Payer: COMMERCIAL

## 2024-05-09 VITALS
DIASTOLIC BLOOD PRESSURE: 70 MMHG | SYSTOLIC BLOOD PRESSURE: 113 MMHG | HEART RATE: 74 BPM | TEMPERATURE: 98 F | RESPIRATION RATE: 16 BRPM | OXYGEN SATURATION: 96 % | WEIGHT: 146 LBS | BODY MASS INDEX: 25 KG/M2

## 2024-05-09 DIAGNOSIS — C50.412 MALIGNANT NEOPLASM OF UPPER-OUTER QUADRANT OF LEFT BREAST IN FEMALE, ESTROGEN RECEPTOR POSITIVE (HCC): Primary | ICD-10-CM

## 2024-05-09 DIAGNOSIS — Z51.81 ENCOUNTER FOR MONITORING AROMATASE INHIBITOR THERAPY: ICD-10-CM

## 2024-05-09 DIAGNOSIS — Z79.811 ENCOUNTER FOR MONITORING AROMATASE INHIBITOR THERAPY: ICD-10-CM

## 2024-05-09 DIAGNOSIS — Z17.0 MALIGNANT NEOPLASM OF UPPER-OUTER QUADRANT OF LEFT BREAST IN FEMALE, ESTROGEN RECEPTOR POSITIVE (HCC): Primary | ICD-10-CM

## 2024-05-09 DIAGNOSIS — Z08 ENCOUNTER FOR FOLLOW-UP SURVEILLANCE OF BREAST CANCER: ICD-10-CM

## 2024-05-09 DIAGNOSIS — Z85.3 ENCOUNTER FOR FOLLOW-UP SURVEILLANCE OF BREAST CANCER: ICD-10-CM

## 2024-05-09 PROCEDURE — 99214 OFFICE O/P EST MOD 30 MIN: CPT | Performed by: INTERNAL MEDICINE

## 2024-05-09 NOTE — PROGRESS NOTES
HPI     Shalini Cruz is a 54 year old female with for follow up of   Encounter Diagnoses   Name Primary?    Malignant neoplasm of upper-outer quadrant of left breast in female, estrogen receptor positive (HCC) Yes    Encounter for monitoring aromatase inhibitor therapy     Encounter for follow-up surveillance of breast cancer        Patient s/p bilateral mastectomies on 12/1/2021.  She had immediate reconstructions and sentinel lymph node biopsy on the left.    Taking letrozole daily and c/o hot flashes, arthralgias hip and knees.  Notices weight gain that is rapid.    Had cortisone injection in the knees that did not help much.  States she used to go for walks and now the knees not as sturdy.      Concerned about weight gain.  Has gained 2 lbs.  Not sure if letrozole or glucosamine.  She will try glucosamine another week and will stop.      Had no menses for 1.5 yrs and then on 3/14/24 had a \"period\".   She had a hysteroscopy and c/w an endometrial polyp.     Has gained 8 lbs in 6 months.     She states she reads about breast cancer and that patients can take hormones, states that there is data about using hormone therapy.  She would like a break from treatment.        ECOG PS 0    Review of Systems:   Review of Systems   Constitutional:  Positive for fatigue. Negative for appetite change and unexpected weight change (weight gain).   Respiratory:  Positive for shortness of breath (states a little). Negative for cough.    Cardiovascular:  Negative for chest pain.   Gastrointestinal:  Negative for abdominal pain.   Endocrine: Positive for hot flashes.   Genitourinary:  Negative for dysuria and frequency.    Musculoskeletal:  Positive for arthralgias and back pain (back problems). Negative for neck pain.   Neurological:  Negative for dizziness and headaches.   Hematological:  Negative for adenopathy.   Psychiatric/Behavioral:  Positive for sleep disturbance.          Current Outpatient Medications   Medication  Sig Dispense Refill    NAPROXEN 500 MG Oral Tab TAKE 1 TABLET (500 MG TOTAL) BY MOUTH 2 (TWO) TIMES DAILY WITH MEALS. TAKE FOR 2 WEEKS AS DIRECTED AND THEN AS NEEDED. 60 tablet 0    cyclobenzaprine 5 MG Oral Tab 5 mg 1-2 tablets three times per day as needed for spasms. Do not operate heavy machinery while on this medication as it may make you sleepy 90 tablet 0    Omega-3 Fatty Acids (OMEGA 3 500 OR) Take by mouth.      letrozole 2.5 MG Oral Tab Take 1 tablet (2.5 mg total) by mouth daily. (Patient taking differently: Take 1 tablet (2.5 mg total) by mouth every morning.) 90 tablet 3    Cholecalciferol (VITAMIN D) 50 MCG (2000 UT) Oral Tab Take by mouth.      Docusate Sodium (STOOL SOFTENER OR) Take by mouth every other day.      Biotin 1000 MCG Oral Tab Take 1,000 mcg by mouth daily.      Ascorbic Acid (VITAMIN C) 1000 MG Oral Tab Take 1 tablet (1,000 mg total) by mouth daily.       Allergies:   Allergies   Allergen Reactions    Iodine [Radiology Contrast Iodinated Dyes] HIVES and RASH    Penicillins HIVES and RASH     No skin blisters or organ involvement       Past Medical History:    Back problem    herniated disc    Breast cancer (HCC)    left breast    Cataract    Colon polyps    Diverticulosis    Esophageal reflux    Gastritis    Hiatal hernia    Hx of motion sickness    Microscopic hematuria    Migraines    menstrual    PONV (postoperative nausea and vomiting)     Past Surgical History:   Procedure Laterality Date    Breast reconstruction  02/2022    Colonoscopy      Colonoscopy N/A 06/08/2018    Procedure: COLONOSCOPY;  Surgeon: John Silva MD;  Location: [facility] ENDOSCOPY    Colonoscopy N/A 06/27/2023    Procedure: COLONOSCOPY;  Surgeon: John Silva MD;  Location: [facility] ENDO    Cyst aspiration left  09/2016    Sheila biopsy stereo nodule 1 site left (cpt=19081)  10/19/2021    2 site left calcs top hat and cork    Mastectomy left Left 12/2021    One Lymph Node Removed    Mastectomy right  12/2021     Other      Rhinoplasty    Removal of ovarian cyst(s)  2009    laparascopic right ov cystectomy, paratubal cyst drainage     Social History     Socioeconomic History    Marital status: Single   Tobacco Use    Smoking status: Never    Smokeless tobacco: Never   Vaping Use    Vaping status: Never Used   Substance and Sexual Activity    Alcohol use: Yes     Alcohol/week: 5.0 standard drinks of alcohol     Types: 3 Glasses of wine, 2 Shots of liquor per week     Comment: occasional    Drug use: Never    Sexual activity: Yes   Other Topics Concern    Caffeine Concern Yes     Comment: coffee       Family History   Problem Relation Age of Onset    Breast Cancer Mother 75    Neurological Disorder Mother         Cerebral Aneurysm    Dementia Mother         Due to brain aneurysm 20 years prior    Heart Disease Father         CAD    Prostate Cancer Father 85    Diabetes Brother     Schizophrenia Brother     Other (cerebral hemmorage) Brother     Breast Cancer Paternal Aunt 84    No Known Problems Sister     Colon Cancer Paternal Grandmother     Colon Cancer Paternal Cousin Male 57         PHYSICAL EXAM:    /70 (BP Location: Right arm, Patient Position: Sitting, Cuff Size: adult)   Pulse 74   Temp 98.2 °F (36.8 °C) (Oral)   Resp 16   Wt 66.2 kg (146 lb)   LMP 03/14/2024 (Exact Date)   SpO2 96%   BMI 25.06 kg/m²   Wt Readings from Last 6 Encounters:   05/09/24 66.2 kg (146 lb)   04/30/24 65.8 kg (145 lb)   04/11/24 64.4 kg (142 lb)   04/02/24 62.6 kg (138 lb)   11/28/23 62.6 kg (138 lb)   11/13/23 62.6 kg (138 lb)     Physical Exam  General: Patient is alert, not in acute distress.  HEENT: EOMs intact. PERRL.   Neck: No JVD. No palpable lymphadenopathy. Neck is supple.  Chest: Clear to auscultation.  Breasts:  B mastectomies with reconstructions.   Heart: Regular rate and rhythm.   Abdomen: Soft, non tender with good bowel sounds.  Extremities: No edema.  Neurological: Grossly intact.   Lymphatics: There is no  palpable lymphadenopathy throughout in the cervical, supraclavicular, axillary, or inguinal regions.  Psych/Depression: nl        ASSESSMENT/PLAN:     1. Malignant neoplasm of upper-outer quadrant of left breast in female, estrogen receptor positive (HCC)    2. Encounter for monitoring aromatase inhibitor therapy    3. Encounter for follow-up surveillance of breast cancer         Cancer Staging   Malignant neoplasm of upper-outer quadrant of left breast in female, estrogen receptor positive (HCC)  Staging form: Breast, AJCC 8th Edition  - Clinical stage from 10/29/2021: Stage IIA (cT2, cN0, cM0, G3, ER+, MD+, HER2-) - Signed by Lester Edmondson MD on 10/29/2021  - Pathologic stage from 12/30/2021: Stage IA (pT2, pN0(sn), cM0, G2, ER+, MD+, HER2-, Oncotype DX score: 2) - Signed by Lester Edmondson MD on 12/30/2021    Shalini Cruz is a 54 year old female with ER/MD positive breast cancer, node negative.      Given that the patient had bilateral mastectomy she completed her local regional control and did not need radiation therapy.    Oncotype score 2, low risk or recurrence.   Discussed with the patient that per data from [employer] Rx trial distant recurrence risk at 9 years with 5 years of adjuvant hormonal therapy is 3%.  Discussed that the group average absolute chemotherapy benefit is less than 1%.  No additional benefit from adjuvant chemotherapy.  Will proceed with adjuvant hormonal therapy alone.    D/w patient data published regarding compliance with adjuvant hormonal therapy that women who stopped taking hormonal therapy early were 35% to 61% more likely to have a recurrence than women who didn’t stop taking the medicine early.    Patient is to complete a total of 5 years of adjuvant hormonal therapy (until January 2027).  Now on letrozole.  Not tolerating well due to hot flashes and arthralgias.  Considering stopping treatment.  D/w patient again data above.  Offered to switch to exemestane, but she will  continue letrozole.      Joint aches:  Follow up with PMR.  Recommend acupuncture as data shows benefit.  Will work with exercise.    Hair loss:  May take Biotin.    Bone health:  baseline DEXA on 9/2023 minimal osteopenia, repeat in Sept of 2025      MDM moderate    No orders of the defined types were placed in this encounter.      Results From Past 48 Hours:  No results found for this or any previous visit (from the past 48 hour(s)).      Imaging & Referrals:  None

## 2024-05-16 ENCOUNTER — OFFICE VISIT (OUTPATIENT)
Dept: OBGYN CLINIC | Facility: CLINIC | Age: 54
End: 2024-05-16

## 2024-05-16 VITALS
SYSTOLIC BLOOD PRESSURE: 133 MMHG | WEIGHT: 144.19 LBS | BODY MASS INDEX: 25 KG/M2 | DIASTOLIC BLOOD PRESSURE: 67 MMHG | HEART RATE: 76 BPM

## 2024-05-16 DIAGNOSIS — N84.0 ENDOMETRIAL POLYP: Primary | ICD-10-CM

## 2024-05-16 PROCEDURE — 99213 OFFICE O/P EST LOW 20 MIN: CPT | Performed by: OBSTETRICS & GYNECOLOGY

## 2024-05-18 NOTE — PROGRESS NOTES
Shalini Cruz is a 54 year old female  Patient's last menstrual period was 2024 (exact date).   Chief Complaint   Patient presents with    Post-Op     2 week POST OP -- s/p hysteroscopic polypectomy / currettage   .    OBSTETRICS HISTORY:  OB History    Para Term  AB Living   0 0 0 0 0 0   SAB IAB Ectopic Multiple Live Births   0 0 0 0 0       GYNE HISTORY:  Periods none due to menopause    History   Sexual Activity    Sexual activity: Yes       MEDICAL HISTORY:  Past Medical History:    Back problem    herniated disc    Breast cancer (HCC)    left breast    Cataract    Colon polyps    Diverticulosis    Esophageal reflux    Gastritis    Hiatal hernia    Hx of motion sickness    Microscopic hematuria    Migraines    menstrual    PONV (postoperative nausea and vomiting)     Past Surgical History:   Procedure Laterality Date    Breast reconstruction  2022    Colonoscopy      Colonoscopy N/A 2018    Procedure: COLONOSCOPY;  Surgeon: John Silva MD;  Location: [facility] ENDOSCOPY    Colonoscopy N/A 2023    Procedure: COLONOSCOPY;  Surgeon: John Silva MD;  Location: [facility] ENDO    Cyst aspiration left  2016    Sheila biopsy stereo nodule 1 site left (cpt=19081)  10/19/2021    2 site left calcs top hat and cork    Mastectomy left Left 2021    One Lymph Node Removed    Mastectomy right  2021    Other      Rhinoplasty    Other surgical history  2024    s/p hysteroscopic polypectomy / currettage    Removal of ovarian cyst(s)      laparascopic right ov cystectomy, paratubal cyst drainage     OB History    Para Term  AB Living   0 0 0 0 0 0   SAB IAB Ectopic Multiple Live Births   0 0 0 0 0        SOCIAL HISTORY:  Social History     Socioeconomic History    Marital status: Single     Spouse name: Not on file    Number of children: Not on file    Years of education: Not on file    Highest education level: Not on file   Occupational History    Not on  file   Tobacco Use    Smoking status: Never    Smokeless tobacco: Never   Vaping Use    Vaping status: Never Used   Substance and Sexual Activity    Alcohol use: Yes     Alcohol/week: 5.0 standard drinks of alcohol     Types: 3 Glasses of wine, 2 Shots of liquor per week     Comment: occasional    Drug use: Never    Sexual activity: Yes   Other Topics Concern     Service Not Asked    Blood Transfusions Not Asked    Caffeine Concern Yes     Comment: coffee    Occupational Exposure Not Asked    Hobby Hazards Not Asked    Sleep Concern Not Asked    Stress Concern Not Asked    Weight Concern Not Asked    Special Diet Not Asked    Back Care Not Asked    Exercise Not Asked    Bike Helmet Not Asked    Seat Belt Not Asked    Self-Exams Not Asked   Social History Narrative    Not on file     Social Determinants of Health     Financial Resource Strain: Not on file   Food Insecurity: Not on file   Transportation Needs: Not on file   Physical Activity: Not on file   Stress: Not on file   Social Connections: Not on file   Housing Stability: Not on file       MEDICATIONS:    Current Outpatient Medications:     NAPROXEN 500 MG Oral Tab, TAKE 1 TABLET (500 MG TOTAL) BY MOUTH 2 (TWO) TIMES DAILY WITH MEALS. TAKE FOR 2 WEEKS AS DIRECTED AND THEN AS NEEDED., Disp: 60 tablet, Rfl: 0    cyclobenzaprine 5 MG Oral Tab, 5 mg 1-2 tablets three times per day as needed for spasms. Do not operate heavy machinery while on this medication as it may make you sleepy, Disp: 90 tablet, Rfl: 0    Omega-3 Fatty Acids (OMEGA 3 500 OR), Take by mouth., Disp: , Rfl:     letrozole 2.5 MG Oral Tab, Take 1 tablet (2.5 mg total) by mouth daily. (Patient taking differently: Take 1 tablet (2.5 mg total) by mouth every morning.), Disp: 90 tablet, Rfl: 3    Cholecalciferol (VITAMIN D) 50 MCG (2000 UT) Oral Tab, Take by mouth., Disp: , Rfl:     Docusate Sodium (STOOL SOFTENER OR), Take by mouth every other day., Disp: , Rfl:     Biotin 1000 MCG Oral Tab,  Take 1,000 mcg by mouth daily., Disp: , Rfl:     Ascorbic Acid (VITAMIN C) 1000 MG Oral Tab, Take 1 tablet (1,000 mg total) by mouth daily., Disp: , Rfl:     ALLERGIES:    Allergies   Allergen Reactions    Iodine [Radiology Contrast Iodinated Dyes] HIVES and RASH    Penicillins HIVES and RASH     No skin blisters or organ involvement         Review of Systems:  Constitutional:    denies fatigue, night sweats, hot flashes  Gastrointestinal:  denies abdominal pain, diarrhea or constipation  Genitourinary:    denies dysuria, abnormal vaginal discharge, vaginal itching  Musculoskeletal:   denies back pain.  Neurological:    denies headaches, extremity weakness or numbness.  Psychiatric:   denies depression or anxiety.        PHYSICAL EXAM:   /67   Pulse 76   Wt 144 lb 3.2 oz (65.4 kg)   LMP 03/14/2024 (Exact Date)   BMI 24.75 kg/m²   Constitutional:  well developed, well nourished  Head/Face: normocephalic  Psychiatric:   oriented to time, place, person and situation. Appropriate mood and affect    US PELVIS W EV (CPT=76856/48110)    Result Date: 4/11/2024  PROCEDURE: US PELVIS W EV (CPT=76856/59440)  COMPARISON: [facility], US PF PELVIS AND TRANSVAG, 9/01/2009, 1:40 PM.  INDICATIONS: Postmenopausal bleeding  TECHNIQUE: Pelvic ultrasound using transabdominal and transvaginal technique.  A transvaginal scan was performed for endometrial and adnexal evaluation  FINDINGS:   UTERUS:   Measures 6.1 x 2.7 x 3.7 cm  ENDOMETRIUM: Endometrial thickness 2.8 mm.  Small nabothian cysts within the cervix MYOMETRIUM: Normal echogenicity.  No masses.   OVARIES AND ADNEXA:  Bilateral ovaries obscured by atrophic changes versus intervening bowel gas   RIGHT:   Nonvisualized right ovary LEFT:   Nonvisualized left ovary  CUL-DE-SAC:   Normal.  No free fluid or mass.  OTHER: Negative.  Bladder appears normal.          CONCLUSION:   1. Normal uterine sonogram.  2. Normal thickness endometrium  measures 2.8 mm. 3. Neither ovary visualized either markedly atrophic or obscured by bowel    Dictated by (CST): Destin Segura MD on 4/11/2024 at 3:03 PM     Finalized by (CST): Destin Segura MD on 4/11/2024 at 3:06 PM          Results for orders placed or performed during the hospital encounter of 04/30/24   Specimen to Pathology Tissue   Result Value Ref Range    Case Report       Surgical Pathology                                Case: [id number]                                  Authorizing Provider:  Kristal Dos Santos MD         Collected:           04/30/2024 08:17 AM          Ordering Location:     [facility]          Received:            04/30/2024 09:02 AM                                 Operating Room                                                               Pathologist:           David Roman MD                                                             Specimen:    Endometrial polyp, 1. Endometrial polyp                                                    Final Diagnosis:         Endometrial polyp:   Polypoid fragments of benign endometrial glands and stroma, consistent with endometrial polyp.  No malignancy is identified.        Embedded Images      Clinical Information       N95.0 Pmb (Postmenopausal Bleeding).         Gross Description       The specimen is submitted in formalin labeled \"Petrogallo, endometrial polyp\" and consists of multiple fragments of pink-tan, soft tissue measuring in aggregate 0.8 x 0.5 x 0.2 cm. The specimen is submitted entirely in cassette A1. (jq)    Tereso Pacheco M.D./[facility] [place]        Interpretation Benign       Recent Labs     02/19/22  1116 08/24/22  0929 08/30/23  0927   RBC 5.00 4.84 4.84   HGB 15.4 14.9 15.3   HCT 45.8 46.1 45.3   MCV 91.6 95.2 93.6   MCH 30.8 30.8 31.6   MCHC 33.6 32.3 33.8   RDW 11.9 12.1 12.0   NEPRELIM 5.65 5.28 5.10   WBC 8.6 7.6 7.5   .0 220.0 216.0         Recent Labs     08/09/21  0749 08/24/22  0929 08/30/23  0927   TSH  1.830 1.870 2.050       Assessment & Plan:    Shalini was seen today for post-op.    Diagnoses and all orders for this visit:    Endometrial polyp        Requested Prescriptions      No prescriptions requested or ordered in this encounter       Reviewed path report in detail. If has persistent  bleed, may need Total Laparoscopic Hysterectomy.  Annual due in October    Spent total time 20 minutes on obtaining history / chart review, evaluating patient / performing medically appropriate exam, discussing treatment options, counseling / educating, and completing documentation, coordinating care.

## 2024-06-05 ENCOUNTER — OFFICE VISIT (OUTPATIENT)
Dept: SURGERY | Facility: CLINIC | Age: 54
End: 2024-06-05
Payer: COMMERCIAL

## 2024-06-05 VITALS
OXYGEN SATURATION: 97 % | RESPIRATION RATE: 18 BRPM | HEART RATE: 68 BPM | BODY MASS INDEX: 25 KG/M2 | WEIGHT: 144.19 LBS | TEMPERATURE: 98 F | SYSTOLIC BLOOD PRESSURE: 111 MMHG | DIASTOLIC BLOOD PRESSURE: 72 MMHG

## 2024-06-05 DIAGNOSIS — C50.412 MALIGNANT NEOPLASM OF UPPER-OUTER QUADRANT OF LEFT BREAST IN FEMALE, ESTROGEN RECEPTOR POSITIVE (HCC): Primary | ICD-10-CM

## 2024-06-05 DIAGNOSIS — Z90.13 ABSENCE OF BREAST, BILATERAL: ICD-10-CM

## 2024-06-05 DIAGNOSIS — Z17.0 MALIGNANT NEOPLASM OF UPPER-OUTER QUADRANT OF LEFT BREAST IN FEMALE, ESTROGEN RECEPTOR POSITIVE (HCC): Primary | ICD-10-CM

## 2024-06-05 PROCEDURE — 99213 OFFICE O/P EST LOW 20 MIN: CPT | Performed by: SURGERY

## 2024-06-05 NOTE — PROGRESS NOTES
Breast Surgery Surveillance Visit     Diagnosis: Left breast cancer status post bilateral nipple sparing mastectomies with left sentinel lymph node biopsy and tissue expander reconstruction on December 1, 2021.     Stage: Cancer Staging  Malignant neoplasm of upper-outer quadrant of left breast in female, estrogen receptor positive (HCC)  Staging form: Breast, AJCC 8th Edition  - Clinical stage from 10/29/2021: Stage IIA (cT2, cN0, cM0, G3, ER+, OK+, HER2-) - Signed by Lester Edmondson MD on 10/29/2021     Disease Status:  Surgical treatment complete, on letrozole per medical oncology.     History:   This 53 year old woman presented with imaging detected breast cancer.  Patient presented for routine screening mammogram on 9/20/2021, patient has breast density category C.  Right breast did not have any changes.  However, of the left breast upper outer quadrant there were linear calcifications for which additional imaging was recommended.  Additional views were performed on 10/6/2021 which showed segmental pleomorphic calcifications in the left upper outer quadrant with suggestion of architectural distortion and was highly suspicious for malignancy for which stereotactic biopsy was recommended.  There is also loosely grouped calcifications some of which appear to demonstrate linear morphology in the anterior left breast which are suspicious and for which stereotactic biopsy was recommended.  Patient then underwent biopsy on 10/19/2021 of both of the sites above discussed pathology at the left upper outer quadrant was consistent with multifocal small areas of infiltrating ductal carcinoma grade 3, largest tumor area of 0.6 cm.  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  She additionally had multifocal microcalcifications and atypical lobular hyperplasia.  The left of breast the lower posterior area had fragments of fibroadenoma.  The  infiltrating ductal carcinoma was ER 95%, progesterone 99%, HER-2/austin was 2+ by IHC, however negative by FISH and Ki-67 of 25%.  Given the multifocal nature of the invasive cancer on biopsy, as well as the DCIS she underwent bilateral breast MRI on 10/26/2021 which at the left breast upper outer quadrant showed a 2 x 1.6 x 2.7 cm heterogenous mass at the site of the biopsy the area of enhancement measure less then the suspicious calcifications seen on mammogram which measured at least 4.8 cm in AP dimension.  Mastectomy was recommended.  Patient underwent bilateral mastectomies with reconstruction which took place without complication.  She denies any new concerns related to her chest wall since her last visit.  She does report some issues related to her joints since starting her letrozole.  She is here today for evaluation and recommendations for further therapy.        Past Medical History:    Back problem    herniated disc    Breast cancer (HCC)    left breast    Cataract    Colon polyps    Diverticulosis    Esophageal reflux    Gastritis    Hiatal hernia    Hx of motion sickness    Microscopic hematuria    Migraines    menstrual    PONV (postoperative nausea and vomiting)       Past Surgical History:   Procedure Laterality Date    Breast reconstruction  02/2022    Colonoscopy      Colonoscopy N/A 06/08/2018    Procedure: COLONOSCOPY;  Surgeon: John Silva MD;  Location: [facility] ENDOSCOPY    Colonoscopy N/A 06/27/2023    Procedure: COLONOSCOPY;  Surgeon: John Silva MD;  Location: [facility] ENDO    Cyst aspiration left  09/2016    Sheila biopsy stereo nodule 1 site left (cpt=19081)  10/19/2021    2 site left calcs top hat and cork    Mastectomy left Left 12/2021    One Lymph Node Removed    Mastectomy right  12/2021    Other      Rhinoplasty    Other surgical history  04/30/2024    s/p hysteroscopic polypectomy / currettage    Removal of ovarian cyst(s)  2009    laparascopic right ov cystectomy, paratubal cyst  drainage         Medications:     NAPROXEN 500 MG Oral Tab TAKE 1 TABLET (500 MG TOTAL) BY MOUTH 2 (TWO) TIMES DAILY WITH MEALS. TAKE FOR 2 WEEKS AS DIRECTED AND THEN AS NEEDED. 60 tablet 0    cyclobenzaprine 5 MG Oral Tab 5 mg 1-2 tablets three times per day as needed for spasms. Do not operate heavy machinery while on this medication as it may make you sleepy 90 tablet 0    Omega-3 Fatty Acids (OMEGA 3 500 OR) Take by mouth.      letrozole 2.5 MG Oral Tab Take 1 tablet (2.5 mg total) by mouth daily. (Patient taking differently: Take 1 tablet (2.5 mg total) by mouth every morning.) 90 tablet 3    Cholecalciferol (VITAMIN D) 50 MCG (2000 UT) Oral Tab Take by mouth.      Docusate Sodium (STOOL SOFTENER OR) Take by mouth every other day.      Biotin 1000 MCG Oral Tab Take 1,000 mcg by mouth daily.      Ascorbic Acid (VITAMIN C) 1000 MG Oral Tab Take 1 tablet (1,000 mg total) by mouth daily.         Allergies:    Allergies   Allergen Reactions    Iodine [Radiology Contrast Iodinated Dyes] HIVES and RASH    Penicillins HIVES and RASH     No skin blisters or organ involvement       Family History:   Family History   Problem Relation Age of Onset    Breast Cancer Mother 75    Neurological Disorder Mother         Cerebral Aneurysm    Dementia Mother         Due to brain aneurysm 20 years prior    Heart Disease Father         CAD    Prostate Cancer Father 85    Diabetes Brother     Schizophrenia Brother     Other (cerebral hemmorage) Brother     Breast Cancer Paternal Aunt 84    No Known Problems Sister     Colon Cancer Paternal Grandmother     Colon Cancer Paternal Cousin Male 57       She is not of Ashkenazi Latter day ancestry.    Social History:  History   Alcohol Use    5.0 standard drinks of alcohol/week    3 Glasses of wine, 2 Shots of liquor per week     Comment: occasional       History   Smoking Status    Never   Smokeless Tobacco    Never       Review of Systems:  General:   The patient denies, fever, chills, night  sweats, fatigue, generalized weakness, change in appetite or weight loss.    HEENT:     The patient denies eye irritation, cataracts, redness, glaucoma, yellowing of the eyes, change in vision, color blindness, or wearing contacts/glasses. The patient denies hearing loss, ringing in the ears, ear drainage, earaches, nasal congestion, nose bleeds, snoring, pain in mouth/throat, hoarseness, change in voice, facial trauma.    Respiratory:  The patient denies chronic cough, phlegm, hemoptysis, pleurisy/chest pain, pneumonia, asthma, wheezing, difficulty in breathing with exertion, emphysema, chronic bronchitis, shortness of breath or abnormal sound when breathing.     Cardiovascular:  There is no history of chest pain, chest pressure/discomfort, palpitations, irregular heartbeat, fainting or near-fainting, difficulty breathing when lying flat, SOB/Coughing at night, swelling of the legs or chest pain while walking.    Breasts:  See history of present illness    Gastrointestinal:     There is no history of difficulty or pain with swallowing, reflux symptoms, vomiting, dark or bloody stools, constipation, yellowing of the skin, indigestion, nausea, change in bowel habits, diarrhea, abdominal pain or vomiting blood.     Genitourinary:  The patient denies frequent urination, needing to get up at night to urinate, urinary hesitancy or retaining urine, painful urination, urinary incontinence, decreased urine stream, blood in the urine or vaginal/penile discharge.    Skin:    The patient denies rash, itching, skin lesions, dry skin, change in skin color or change in moles.     Hematologic/Lymphatic:  The patient denies easily bruising or bleeding or persistent swollen glands or lymph nodes.     Musculoskeletal:  The patient denies muscle aches/pain, joint pain, stiff joints, neck pain, back pain or bone pain.    Neuropsychiatric:  There is no history of migraines or severe headaches, seizure/epilepsy, speech problems,  coordination problems, trembling/tremors, fainting/black outs, dizziness, memory problems, loss of sensation/numbness, problems walking, weakness, tingling or burning in hands/feet. There is no history of abusive relationship, bipolar disorder, sleep disturbance, anxiety, depression or feeling of despair.    Endocrine:    There is no history of poor/slow wound healing, weight loss/gain, fertility or hormone problems, cold intolerance, thyroid disease.     Allergic/Immunologic:  There is no history of hives, hay fever, angioedema or anaphylaxis.    /72 (BP Location: Left arm, Patient Position: Sitting, Cuff Size: adult)   Pulse 68   Temp 97.9 °F (36.6 °C) (Temporal)   Resp 18   Wt 65.4 kg (144 lb 3.2 oz)   LMP 03/14/2024 (Exact Date)   SpO2 97%   BMI 24.75 kg/m²     Physical Exam:  The patient is an alert, oriented, well-nourished and  well-developed woman who appears her stated age. Her speech patterns and movements are normal. Her affect is appropriate.    HEENT: The head is normocephalic. The neck is supple. The thyroid is not enlarged and is without palpable masses/nodules. There are no palpable masses. The trachea is in the midline. Conjunctiva are clear, non-icteric.    Chest: The chest expands symmetrically. The lungs are clear to auscultation.    Heart: The rhythm is regular.  There are no murmurs, rubs, gallops or thrills.    Breasts:  Her breasts are surgically absent.    Skin on both sides demonstrates no nodules, pigmentation changes or underlying palpable concerns.  She has no palpable axillary adenopathy.    Abdomen:  The abdomen is soft, flat and non tender. The liver is not enlarged. There are no palpable masses.    Lymph Nodes:  The supraclavicular, axillary and cervical regions are free of significant lymphadenopathy.    Back: There is no vertebral column tenderness.    Skin: The skin appears normal. There are no suspicious appearing rashes or lesions.    Extremities: The extremities  are without deformity, cyanosis or edema.    Impression:   Ms. Shalini Cruz is a 54 year old woman presents with imaging detected left breast cancer status post bilateral nipple sparing mastectomies with left sentinel lymph node biopsy and reconstruction.    Discussion and Plan:  I had a discussion with the Patient regarding her breast exam. On exam today I found her to be healing well since her surgery with no signs of new or recurrent disease. She has been tolerating letrozole under the direction of medical oncology with side effects of joint pain and hot flashes..  She will need no further mammograms status post bilateral mastectomies.   I recommended she follow up in 12 months for another exam.  I encouraged her to continue monitoring her ROM and strength and explained that a referral to physical therapy may be warranted in the future if she identifies any limitations or restrictions. She was encouraged to contact the office with any questions or concerns prior to her next scheduled appointment.     This encounter lasted a total of 25 minutes, more than 50% of which was dedicated to the discussion of management options.

## 2024-06-11 ENCOUNTER — OFFICE VISIT (OUTPATIENT)
Dept: PHYSICAL MEDICINE AND REHAB | Facility: CLINIC | Age: 54
End: 2024-06-11
Payer: COMMERCIAL

## 2024-06-11 VITALS — BODY MASS INDEX: 25 KG/M2 | HEART RATE: 73 BPM | WEIGHT: 144 LBS | RESPIRATION RATE: 18 BRPM | OXYGEN SATURATION: 97 %

## 2024-06-11 DIAGNOSIS — M70.62 GREATER TROCHANTERIC BURSITIS OF BOTH HIPS: ICD-10-CM

## 2024-06-11 DIAGNOSIS — M54.16 LUMBAR RADICULOPATHY: ICD-10-CM

## 2024-06-11 DIAGNOSIS — M22.2X9 PATELLOFEMORAL PAIN SYNDROME, UNSPECIFIED LATERALITY: Primary | ICD-10-CM

## 2024-06-11 DIAGNOSIS — S76.019A STRAIN OF GLUTEUS MEDIUS, UNSPECIFIED LATERALITY, INITIAL ENCOUNTER: ICD-10-CM

## 2024-06-11 DIAGNOSIS — M70.61 GREATER TROCHANTERIC BURSITIS OF BOTH HIPS: ICD-10-CM

## 2024-06-11 DIAGNOSIS — M79.10 MYALGIA: ICD-10-CM

## 2024-06-11 DIAGNOSIS — M17.10 PRIMARY OSTEOARTHRITIS OF KNEE, UNSPECIFIED LATERALITY: ICD-10-CM

## 2024-06-11 PROCEDURE — 99214 OFFICE O/P EST MOD 30 MIN: CPT | Performed by: PHYSICAL MEDICINE & REHABILITATION

## 2024-06-11 RX ORDER — NAPROXEN 500 MG/1
500 TABLET ORAL 2 TIMES DAILY WITH MEALS
Qty: 60 TABLET | Refills: 0 | Status: SHIPPED | OUTPATIENT
Start: 2024-06-11

## 2024-06-11 RX ORDER — CYCLOBENZAPRINE HCL 5 MG
TABLET ORAL
Qty: 90 TABLET | Refills: 0 | Status: SHIPPED | OUTPATIENT
Start: 2024-06-11

## 2024-06-11 NOTE — PATIENT INSTRUCTIONS
1) Please begin physical therapy as soon as possible.   2) Take Naprosyn 500 mg 1 tablet twice per day with food for the next two weeks and then as needed but no more than 2 tablets per day. Do not take with any other NSAIDS (Ibuprofen, Advil, Aleve, etc). OK to take Tylenol 500 mg every 6 hours as needed for pain. If you develop any side effects including stomach aches, nausea, vomiting, or other gastrointestinal symptoms, stop the medication and call my office.   3) Start cyclobenzaprine 5 mg 0.5-2 tablets three times per day as needed for spasms. Do not operate heavy machinery while on this medication as it may make you sleepy.   4) Tylenol 500-1000 mg every 6-8 hours as needed for pain.  No more than 3000 mg daily.  5) Follow up with me in about 6 weeks. If left lateral hip pain persists, then would recommend LEFt GTB CSI under ultrasound

## 2024-06-11 NOTE — PROGRESS NOTES
[facility]  Progress Note    CHIEF COMPLAINT:    Chief Complaint   Patient presents with    Follow - Up     Pt is coming in to F/U on bilateral hip pain, Pt states that she is feeling somewhat ok from her knees but states that she is now having a left hip pain, Pt states that she feels a pinching in her left hip that radiates down left leg, pain 5/10       History of Present Illness:  The patient is a 54 year old   female with a significant history of breast cancer who presents for follow-up of her left knee pain and is also complaining of left-sided low back and buttock pain radiating to the left lateral hip and lateral thigh.  She is status post left knee hyaluronic acid and corticosteroid injection on 4/9/2024 which she did find to be helpful but she has felt her left knee giving out on her a couple of times.  She rates her discomfort a 5 out of 10 and describes it as a pinching sensation.  She has been walking but has not been doing formal strengthening exercises.    PAST MEDICAL HISTORY:  Past Medical History:    Back problem    herniated disc    Breast cancer (HCC)    left breast    Cataract    Colon polyps    Diverticulosis    Esophageal reflux    Gastritis    Hiatal hernia    Hx of motion sickness    Microscopic hematuria    Migraines    menstrual    PONV (postoperative nausea and vomiting)       SURGICAL HISTORY:  Past Surgical History:   Procedure Laterality Date    Breast reconstruction  02/2022    Colonoscopy      Colonoscopy N/A 06/08/2018    Procedure: COLONOSCOPY;  Surgeon: John Silva MD;  Location: [facility] ENDOSCOPY    Colonoscopy N/A 06/27/2023    Procedure: COLONOSCOPY;  Surgeon: John Silva MD;  Location: [facility] ENDO    Cyst aspiration left  09/2016    Sheila biopsy stereo nodule 1 site left (cpt=19081)  10/19/2021    2 site left calcs top hat and cork    Mastectomy left Left 12/2021    One Lymph Node Removed    Mastectomy right  12/2021     Other      Rhinoplasty    Other surgical history  04/30/2024    s/p hysteroscopic polypectomy / currettage    Removal of ovarian cyst(s)  2009    laparascopic right ov cystectomy, paratubal cyst drainage       SOCIAL HISTORY:   Social History     Occupational History    Not on file   Tobacco Use    Smoking status: Never    Smokeless tobacco: Never   Vaping Use    Vaping status: Never Used   Substance and Sexual Activity    Alcohol use: Yes     Alcohol/week: 5.0 standard drinks of alcohol     Types: 3 Glasses of wine, 2 Shots of liquor per week     Comment: occasional    Drug use: Never    Sexual activity: Yes       FAMILY HISTORY:   Family History   Problem Relation Age of Onset    Breast Cancer Mother 75    Neurological Disorder Mother         Cerebral Aneurysm    Dementia Mother         Due to brain aneurysm 20 years prior    Heart Disease Father         CAD    Prostate Cancer Father 85    Diabetes Brother     Schizophrenia Brother     Other (cerebral hemmorage) Brother     Breast Cancer Paternal Aunt 84    No Known Problems Sister     Colon Cancer Paternal Grandmother     Colon Cancer Paternal Cousin Male 57       CURRENT MEDICATIONS:   Current Outpatient Medications   Medication Sig Dispense Refill    naproxen 500 MG Oral Tab Take 1 tablet (500 mg total) by mouth 2 (two) times daily with meals. Take for 2 weeks as directed and then as needed. 60 tablet 0    cyclobenzaprine 5 MG Oral Tab 5 mg 1-2 tablets three times per day as needed for spasms. Do not operate heavy machinery while on this medication as it may make you sleepy 90 tablet 0    Omega-3 Fatty Acids (OMEGA 3 500 OR) Take by mouth.      letrozole 2.5 MG Oral Tab Take 1 tablet (2.5 mg total) by mouth daily. (Patient taking differently: Take 1 tablet (2.5 mg total) by mouth every morning.) 90 tablet 3    Cholecalciferol (VITAMIN D) 50 MCG (2000 UT) Oral Tab Take by mouth.      Docusate Sodium (STOOL SOFTENER OR) Take by mouth every other day.      Biotin  1000 MCG Oral Tab Take 1,000 mcg by mouth daily.      Ascorbic Acid (VITAMIN C) 1000 MG Oral Tab Take 1 tablet (1,000 mg total) by mouth daily.         ALLERGIES:   Allergies   Allergen Reactions    Iodine [Radiology Contrast Iodinated Dyes] HIVES and RASH    Penicillins HIVES and RASH     No skin blisters or organ involvement       REVIEW OF SYSTEMS:   Review of Systems   Constitutional: Negative.    HENT: Negative.    Eyes: Negative.    Respiratory: Negative.    Cardiovascular: Negative.    Gastrointestinal: Negative.    Genitourinary: Negative.    Musculoskeletal: As per HPI  Skin: Negative.    Neurological: As per HPI  Endo/Heme/Allergies: Negative.    Psychiatric/Behavioral: Negative.      All other systems reviewed and are negative. Pertinent positives and negatives noted in the HPI.        PHYSICAL EXAM:   Pulse 73   Resp 18   Wt 144 lb (65.3 kg)   LMP 03/14/2024 (Exact Date)   SpO2 97%   BMI 24.72 kg/m²     Body mass index is 24.72 kg/m².      General: No immediate distress  Head: Normocephalic/ Atraumatic  Eyes: Extra-occular movements intact.   Ears: No auricular hematoma or deformities  Mouth: No lesions or ulcerations  Heart: peripheral pulses intact. Normal capillary refill.   Lungs: Non-labored respirations  Abdomen: No abdominal guarding  Extremities: No lower extremity edema bilaterally   Skin: No lesions noted.   Cognition: alert & oriented x 3, attentive, able to follow 2 step commands, comprehention intact, spontaneous speech intact  Motor:    Musculoskeletal:        Data  Admission on 04/30/2024, Discharged on 04/30/2024   Component Date Value Ref Range Status    Case Report 04/30/2024    Final                    Value:Surgical Pathology                                Case: [id number]                                  Authorizing Provider:  Kristal Dos Santos MD         Collected:           04/30/2024 08:17 AM          Ordering Location:     [facility]          Received:             04/30/2024 09:02 AM                                 Operating Room                                                               Pathologist:           David Roman MD                                                             Specimen:    Endometrial polyp, 1. Endometrial polyp                                                    Final Diagnosis: 04/30/2024    Final                    Value:This result contains rich text formatting which cannot be displayed here.    Clinical Information 04/30/2024    Final                    Value:This result contains rich text formatting which cannot be displayed here.    Gross Description 04/30/2024    Final                    Value:This result contains rich text formatting which cannot be displayed here.    Interpretation 04/30/2024 Benign    Final   ]      Radiology Imaging:  I reviewed with the patient her MRI of the lumbar spine from 9/8/2023  PROCEDURE: MRI SPINE LUMBAR (CPT=72148)     COMPARISON: [facility], XR LUMBAR SPINE (MIN 4 VIEWS) (CPT=72110), 7/16/2023, 8:57 AM.  [facility], MRI LUMBAR SPINE WO CONTR, 8/20/2006, 1:26 PM.     INDICATIONS: Z17.0 Malignant neoplasm of upper-outer quadrant of left breast in female, estrogen receptor positive  C50.412 Malignant neoplasm of upper-outer quadrant of le*     TECHNIQUE: A variety of imaging planes and parameters were utilized for visualization of suspected pathology.     FINDINGS:  NUMERATION: For the purposes of this examination, the lowest fully formed disc space is designated as L5-S1.  ALIGNMENT: There is preservation of the expected lumbar lordosis.  BONES: No fracture or suspicious osseous lesion is evident.  CORD/CAUDA EQUINA: The distal cord and nerve roots have normal caliber, contour, and signal intensity.  PARASPINAL AREA: Incidental S2-S3 sacral Tarlov cyst.  OTHER: Negative.     LUMBAR DISC LEVELS:  L1-L2: No significant disc/facet abnormality, spinal stenosis, or foraminal  stenosis.    L2-L3: No significant disc/facet abnormality, spinal stenosis, or foraminal stenosis.    L3-L4: No significant disc/facet abnormality, spinal stenosis, or foraminal stenosis.    L4-L5: Small diffuse disc bulge with slight ligamentum flavum redundancy and minimal bilateral facet arthrosis dropped a 3 in addition to a tiny left subarticular/foraminal zone disc protrusion/annular fissure.  No significant resultant spinal canal,  neural foraminal, or lateral recess compromise.  L5-S1: Central/bilateral paracentral disc protrusion with mild bilateral facet arthropathy.  Mild bilateral lateral recess stenosis with no substantial spinal canal or neural foraminal compromise.               Impression   CONCLUSION:     1. Multilevel degenerative changes of the lumbar spine as detailed. Notable levels as follows:     2. L5-S1:  Broad-based central/bilateral paracentral disc protrusion, which results in mild bilateral lateral recess stenosis.  3. L4-L5:  Disc disease with a small left subarticular/foraminal zone protrusion with associated annular fissure.  No significant resultant neural compromise.     4. No suspicious marrow replacing foci throughout the imaged lumbosacral spine to suggest osseous metastatic disease.     5. Lesser incidental findings as above.        elm-remote     Dictated by (CST): Baldev Yi MD on 9/08/2023 at 12:34 PM      Finalized by (CST): Baldev Yi MD on 9/08/2023 at 12:40 PM         ASSESSMENT AND PLAN:  Shalini is a pleasant 54-year-old female presents for follow-up of her left-sided low back and buttock pain with radiation to the left lateral hip and thigh along with her left knee pain.  I believe her symptoms are mostly due to gluteal weakness and greater trochanteric bursitis leading to IT band syndrome as well as her patellofemoral syndrome and left knee osteoarthritis.  I believe the gluteal weakness is mostly due to a lumbar radiculopathy.  I am recommending she begin  formal physical therapy and use Naprosyn as well as cyclobenzaprine and Tylenol.  If her symptoms continue, then we can consider a left greater trochanter bursa corticosteroid injection under ultrasound guidance.       RTC in 6 weeks  Discharge Instructions were provided as documented in AVS summary.  The patient was in agreement with the assessment and plan.  All questions were answered.  There were no barriers to learning.         1. Patellofemoral pain syndrome, unspecified laterality    2. Primary osteoarthritis of knee, unspecified laterality    3. Myalgia    4. Lumbar radiculopathy    5. Strain of gluteus medius, unspecified laterality, initial encounter    6. Greater trochanteric bursitis of both hips        Alex B. Behar MD  Physical Medicine and Rehabilitation/Sports Medicine  [facility]

## 2024-07-12 ENCOUNTER — TELEPHONE (OUTPATIENT)
Dept: PHYSICAL THERAPY | Facility: HOSPITAL | Age: 54
End: 2024-07-12

## 2024-07-15 ENCOUNTER — OFFICE VISIT (OUTPATIENT)
Dept: PHYSICAL THERAPY | Age: 54
End: 2024-07-15
Attending: PHYSICAL MEDICINE & REHABILITATION
Payer: COMMERCIAL

## 2024-07-15 DIAGNOSIS — S76.019A STRAIN OF GLUTEUS MEDIUS, UNSPECIFIED LATERALITY, INITIAL ENCOUNTER: ICD-10-CM

## 2024-07-15 DIAGNOSIS — M17.10 PRIMARY OSTEOARTHRITIS OF KNEE, UNSPECIFIED LATERALITY: ICD-10-CM

## 2024-07-15 DIAGNOSIS — M22.2X9 PATELLOFEMORAL PAIN SYNDROME, UNSPECIFIED LATERALITY: Primary | ICD-10-CM

## 2024-07-15 DIAGNOSIS — M70.62 GREATER TROCHANTERIC BURSITIS OF BOTH HIPS: ICD-10-CM

## 2024-07-15 DIAGNOSIS — M54.16 LUMBAR RADICULOPATHY: ICD-10-CM

## 2024-07-15 DIAGNOSIS — M70.61 GREATER TROCHANTERIC BURSITIS OF BOTH HIPS: ICD-10-CM

## 2024-07-15 DIAGNOSIS — M79.10 MYALGIA: ICD-10-CM

## 2024-07-15 PROCEDURE — 97140 MANUAL THERAPY 1/> REGIONS: CPT | Performed by: PHYSICAL THERAPIST

## 2024-07-15 PROCEDURE — 97110 THERAPEUTIC EXERCISES: CPT | Performed by: PHYSICAL THERAPIST

## 2024-07-15 PROCEDURE — 97161 PT EVAL LOW COMPLEX 20 MIN: CPT | Performed by: PHYSICAL THERAPIST

## 2024-07-15 NOTE — PROGRESS NOTES
SPINE EVALUATION:   Referring Physician: Dr. Behar  Diagnosis: Patellofemoral pain syndrome, unspecified laterality (M22.2X9)  Primary osteoarthritis of knee, unspecified laterality (M17.10)  Myalgia (M79.10)  Lumbar radiculopathy (M54.16)  Strain of gluteus medius, unspecified laterality, initial encounter (S76.019A)  Greater trochanteric bursitis of both hips (M70.61,M70.62)     Date of Service: 7/15/2024  MEDICATION CHANGES SINCE LAST MD REVIEW? NO  MD appt: none scheduled     PATIENT SUMMARY   Shalini Cruz is a 54 year old female who presents to therapy today with complaints of LBP radiating to the (L)LE up to the anterior leg since 15-20 years ago for no apparent cause. 1-2 episodes every year since onset. Taking meds and stretching normally helped. Tried chiropractic treatments before which did not help. Previous PT a while ago with unknown outcomes. Radicular symptoms started about 2 years ago for no apparent cause. Reports h/o cancer and thinks cancer meds contributing to her problems.  Pt describes pain level current 2/10, at best 1/10, at worst 5/10.   Symptoms aggravated by (L) sidelying; rising from floor, sitting. Prefers to walk vs stand vs sit. Walks about 3 miles 3-4 times per wk.  Current functional limitations include decreased tolerance to prolonged sitting and lie on (L) side.     Shalini describes prior level of function being (I) and active. Lives with family and shares homemaking tasks; heaviest chore is vacuuming.   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  Pt goals include pain relief and learn exercises that will help.  Past medical history was reviewed with Shalini. Significant findings include  has a past medical history of Back problem, Breast cancer (HCC) (12/2021), Cataract, Colon polyps, Diverticulosis, Esophageal reflux, Gastritis, Hiatal hernia, motion sickness, Microscopic hematuria (2010), Migraines, and PONV (postoperative nausea and vomiting).    She has  no past medical history of Anxiety, Carotid stenosis, Chronic atrial fibrillation (HCC), Deep vein thrombosis (HCC), Dementia (HCC), Depression, Diabetes (HCC), Difficult intubation, Essential hypertension, Family history of malignant hyperthermia, Family history of pseudocholinesterase deficiency, Fibromyalgia, High blood pressure, High cholesterol, History of adverse reaction to anesthesia, History of blood transfusion, Hyperlipidemia, Malignant hyperthermia, Myocardial infarction (HCC), Personal history of antineoplastic chemotherapy, Prediabetes, Pseudocholinesterase deficiency, Pulmonary embolism (HCC), Seizure disorder (HCC), Sleep apnea, Stroke (HCC), Traumatic brain injury (HCC), Tremor, or Type 1 diabetes mellitus (HCC).   Pt denies diplopia, dysarthria, dysphasia, dizziness, drop attacks, bowel/bladder changes, saddle anesthesia, and JUDI LE N/T.    ASSESSMENT  Shalini presents to physical therapy evaluation with primary c/o LBP radiating to the (L)LE. The results of the objective tests and measures show symptoms and findings consistent with mechanical LBP with directional preference towards extension with slight bias towards unloading.  Functional deficits include but are not limited to decreased tolerance to prolonged sitting.  Signs and symptoms are consistent with medical diagnoses as stated above. Pt and PT discussed evaluation findings, pathology, POC and HEP.  Pt voiced understanding and performs HEP correctly without reported pain. Skilled Physical Therapy is medically necessary to address the above impairments and reach functional goals.     Precautions:  Cancer  OBJECTIVE:   Observation/Posture: forward head and rounded shoulders; flexed at hips in quiet standing  Neuro Screen: forward head and rounded shoulders; sway back    Lumbar AROM: (* denotes performed with pain)  Flexion: WNL  Extension: mod to severe loss  Sidebending: R WNL; L mod loss  Rotation: R WNL; L mod loss    Accessory motion: mod  to severe loss of lumbar PA's  Palpation: muscle guarding to paralumbars    Strength: (* denotes performed with pain)  (B)LE grossly 4+ to 5/5 except for hip flexors, abd and EHL on (L) grossly 4-/5    Flexibility:   Mod loss to (B) hip flexors    Special tests:   (+) (L) Lasegue's, (-) lumbar quadrant    Gait: pt ambulates on level ground with trendelenburg/waddle  Balance: SLS R WNL, L WNL    Today’s Treatment and Response:   Pt education was provided on exam findings, treatment diagnosis, treatment plan, expectations, and prognosis. Pt was also provided recommendations for activity modifications, possible soreness after evaluation, postural corrections, ergonomics, pain science education , importance of remaining active, and flexion avoidance and use of lumbar roll for all sitting.      Therapeutic exercise (10 mins)  Prone lying  ROMARIO  REIL 3x10  LIONEL 3x10  Prone quad stretch    Manual Therapy (8 mins)  Lumbar PA's Gr 2-3 x 3 mins  STM to paralumbars x 5 mins    Patient was instructed in and issued a HEP for: Refer to patient instructions.      Charges: PT Eval Low Complexity, 30 mins; Therapeutic exercise x 1; Manual therapy x 1      Total Timed Treatment: 18 mins     Total Treatment Time: 48 mins     Based on clinical rationale and outcome measures, this evaluation involved Low Complexity decision making due to 1-2 personal factors/comorbidities, 4+ body structures involved/activity limitations, and unstable symptoms including changing pain levels.  PLAN OF CARE:    Goals: (to be met in 8 visits)   Shalini will consistently centralize symptoms with postural adjustments and home program to reduce tissue irritability.  Shalini will return to painfree flexion to allow sitting for work without symptom provocation.  Shalini will improve core and proximal LE strength to 4+ to 5/5 to allow increased tolerance to static positions without symptom provocation.  Shalini will be (I) with a home program to allow discharge from PT  towards further self-recovery and maintenance of function.    Frequency / Duration: Patient will be seen for 1-2 x/week or a total of 8 visits over a 90 day period. Treatment will include: Manual Therapy, Neuromuscular Re-education, Self-Care Home Management, Therapeutic Activities, Therapeutic Exercise, and Home Exercise Program instruction    Education or treatment limitation: None  Rehab Potential:good    Oswestry Disability Index Score  Score: 6 % (7/15/2024  7:07 AM)  LEFS Score  LEFS Score: 77.5 % (7/11/2024  7:50 AM)      Shalini Cruz was advised of these findings, precautions, and treatment options and has agreed to actively participate in planning and for this course of care.    Thank you for your referral. Please co-sign or sign and return this letter via fax as soon as possible to [phone]. If you have any questions, please contact me at Dept: [phone]    Sincerely,  Electronically signed by therapist: Dk Snyder, PT    Physician's certification required: Yes  I certify the need for these services furnished under this plan of treatment and while under my care.    X___________________________________________________ Date____________________    Certification From: 7/15/2024  To:10/13/2024

## 2024-07-24 ENCOUNTER — OFFICE VISIT (OUTPATIENT)
Dept: PHYSICAL THERAPY | Age: 54
End: 2024-07-24
Attending: PHYSICAL MEDICINE & REHABILITATION
Payer: COMMERCIAL

## 2024-07-24 PROCEDURE — 97110 THERAPEUTIC EXERCISES: CPT | Performed by: PHYSICAL THERAPIST

## 2024-07-24 PROCEDURE — 97112 NEUROMUSCULAR REEDUCATION: CPT | Performed by: PHYSICAL THERAPIST

## 2024-07-24 NOTE — PROGRESS NOTES
Dx: Patellofemoral pain syndrome, unspecified laterality (M22.2X9)  Primary osteoarthritis of knee, unspecified laterality (M17.10)  Myalgia (M79.10)  Lumbar radiculopathy (M54.16)  Strain of gluteus medius, unspecified laterality, initial encounter (S76.019A)  Greater trochanteric bursitis of both hips (M70.61,M70.62)          Insurance (Authorized # of Visits):  8           Authorizing Physician: Dr. Behar  Next MD visit: none scheduled  Fall Risk: standard         Precautions: n/a           Medication changes per patient? NO  Date of evaluation/PN:7/15/2024  Pain ratin/10  Subjective: Reports no change in symptoms compared to initial session but had increased LBP after her initial session which has since returned to baseline.    Objective: Poor transverse abdominal and multifidi recruitment even with tactile and verbal external cues. Increased excursion from midline of upper quadrant noted; able to correct with verbal and internal cues      Assessment: Core weakness contributing to poor postural control and decreased tolerance to extension limiting reduction of lumbar posterior derangement.  Treatment today include Neuromuscular Re-education and Therapeutic Exercise focusing on core engagement and postural control with movement and functional activities and noted improvement in core mm awareness post session. Residual deficits continue to be noted in poor recruitment of segmental stabilizers and will be addressed with continued skilled interventions.      Goals: In progress as follows:  Shalini will consistently centralize symptoms with postural adjustments and home program to reduce tissue irritability.  Shalini will return to painfree flexion to allow sitting for work without symptom provocation.  Shalini will improve core and proximal LE strength to 4+ to 5/5 to allow increased tolerance to static positions without symptom provocation.  Shalini will be (I) with a home program to allow discharge from PT towards further  self-recovery and maintenance of function.    Plan: Continue to progress core strengthening as tolerated.   Date: 7/24/2024  TX#: 2/8 Date:                 TX#: 3/ Date:                 TX#: 4/ Date:                 TX#: 5/ Date:   Tx#: 6/ Date:   Tx#: 7/ Date:   Tx#: 8/ Date:   Tx#: 9/ Date:   Tx#: 10/ Date:   Tx#: 11/ Date:   Tx#: 12/ Date:   Tx#: 13/ Date:   Tx#: 14/ Date:   Tx#: 15/ Date:   Tx#: 16/   THERAPEUTIC EX 38 mins                 Slantboard stretch L3 (B) 3 x 1 min ea                 TM retrowalk 12% grade 0.5mph x 10 mins focus on core stability                 HL bent knee fall outs Green tband 3x10 ea                 SL clamshells 2x10 ea                 ROMARIO with femoral nerve flossing 1x10                 HL isometric hip add Yoga ball 3x10                 Prone heel squeeze  Yoga ball with knee flexion 3x10                                   NEUROMUSCULAR RE-EDUCATION 8 mins                 HL TA bracing Self-biofeedback with leaky tire internal cue x 5 mins                 Prone multifidi isometrics  3 mins                                   MANUAL TX                                    THERAPEUTIC ACTIVITY                                    HEP: Refer to patient instructions    Charges: Therapeutic exercise x 3; Neuromuscular re-education x 1       Total Timed Treatment: 46 min  Total Treatment Time: 46 min

## 2024-07-31 ENCOUNTER — APPOINTMENT (OUTPATIENT)
Dept: PHYSICAL THERAPY | Age: 54
End: 2024-07-31
Attending: PHYSICAL MEDICINE & REHABILITATION
Payer: COMMERCIAL

## 2024-08-07 ENCOUNTER — APPOINTMENT (OUTPATIENT)
Dept: PHYSICAL THERAPY | Age: 54
End: 2024-08-07
Attending: PHYSICAL MEDICINE & REHABILITATION
Payer: COMMERCIAL

## 2024-08-14 ENCOUNTER — APPOINTMENT (OUTPATIENT)
Dept: PHYSICAL THERAPY | Age: 54
End: 2024-08-14
Attending: PHYSICAL MEDICINE & REHABILITATION
Payer: COMMERCIAL

## 2024-08-21 ENCOUNTER — APPOINTMENT (OUTPATIENT)
Dept: PHYSICAL THERAPY | Age: 54
End: 2024-08-21
Attending: PHYSICAL MEDICINE & REHABILITATION
Payer: COMMERCIAL

## 2024-08-28 ENCOUNTER — APPOINTMENT (OUTPATIENT)
Dept: PHYSICAL THERAPY | Age: 54
End: 2024-08-28
Attending: PHYSICAL MEDICINE & REHABILITATION
Payer: COMMERCIAL

## 2024-09-04 ENCOUNTER — LAB ENCOUNTER (OUTPATIENT)
Dept: LAB | Age: 54
End: 2024-09-04
Attending: FAMILY MEDICINE
Payer: COMMERCIAL

## 2024-09-04 ENCOUNTER — OFFICE VISIT (OUTPATIENT)
Dept: FAMILY MEDICINE CLINIC | Facility: CLINIC | Age: 54
End: 2024-09-04
Payer: COMMERCIAL

## 2024-09-04 VITALS
SYSTOLIC BLOOD PRESSURE: 115 MMHG | HEART RATE: 65 BPM | BODY MASS INDEX: 23.69 KG/M2 | DIASTOLIC BLOOD PRESSURE: 77 MMHG | HEIGHT: 65 IN | WEIGHT: 142.19 LBS

## 2024-09-04 DIAGNOSIS — E55.9 VITAMIN D DEFICIENCY: ICD-10-CM

## 2024-09-04 DIAGNOSIS — Z00.00 ROUTINE MEDICAL EXAM: Primary | ICD-10-CM

## 2024-09-04 DIAGNOSIS — Z85.3 HISTORY OF INFILTRATING DUCTAL CARCINOMA OF BREAST: ICD-10-CM

## 2024-09-04 DIAGNOSIS — Z78.0 POST-MENOPAUSAL: ICD-10-CM

## 2024-09-04 DIAGNOSIS — Z00.00 ROUTINE MEDICAL EXAM: ICD-10-CM

## 2024-09-04 LAB
ALBUMIN SERPL-MCNC: 5 G/DL (ref 3.2–4.8)
ALBUMIN/GLOB SERPL: 1.9 {RATIO} (ref 1–2)
ALP LIVER SERPL-CCNC: 81 U/L
ALT SERPL-CCNC: 19 U/L
ANION GAP SERPL CALC-SCNC: 6 MMOL/L (ref 0–18)
AST SERPL-CCNC: 21 U/L (ref ?–34)
BASOPHILS # BLD AUTO: 0.04 X10(3) UL (ref 0–0.2)
BASOPHILS NFR BLD AUTO: 0.6 %
BILIRUB SERPL-MCNC: 0.7 MG/DL (ref 0.3–1.2)
BUN BLD-MCNC: 18 MG/DL (ref 9–23)
BUN/CREAT SERPL: 25.4 (ref 10–20)
CALCIUM BLD-MCNC: 10.5 MG/DL (ref 8.7–10.4)
CHLORIDE SERPL-SCNC: 108 MMOL/L (ref 98–112)
CHOLEST SERPL-MCNC: 249 MG/DL (ref ?–200)
CO2 SERPL-SCNC: 28 MMOL/L (ref 21–32)
CREAT BLD-MCNC: 0.71 MG/DL
DEPRECATED RDW RBC AUTO: 40 FL (ref 35.1–46.3)
EGFRCR SERPLBLD CKD-EPI 2021: 100 ML/MIN/1.73M2 (ref 60–?)
EOSINOPHIL # BLD AUTO: 0.18 X10(3) UL (ref 0–0.7)
EOSINOPHIL NFR BLD AUTO: 2.5 %
ERYTHROCYTE [DISTWIDTH] IN BLOOD BY AUTOMATED COUNT: 11.9 % (ref 11–15)
FASTING PATIENT LIPID ANSWER: YES
FASTING STATUS PATIENT QL REPORTED: YES
GLOBULIN PLAS-MCNC: 2.7 G/DL (ref 2–3.5)
GLUCOSE BLD-MCNC: 96 MG/DL (ref 70–99)
HCT VFR BLD AUTO: 44.3 %
HDLC SERPL-MCNC: 91 MG/DL (ref 40–59)
HGB BLD-MCNC: 15.9 G/DL
IMM GRANULOCYTES # BLD AUTO: 0.02 X10(3) UL (ref 0–1)
IMM GRANULOCYTES NFR BLD: 0.3 %
LDLC SERPL CALC-MCNC: 139 MG/DL (ref ?–100)
LYMPHOCYTES # BLD AUTO: 2.03 X10(3) UL (ref 1–4)
LYMPHOCYTES NFR BLD AUTO: 28.1 %
MCH RBC QN AUTO: 32.6 PG (ref 26–34)
MCHC RBC AUTO-ENTMCNC: 35.9 G/DL (ref 31–37)
MCV RBC AUTO: 90.8 FL
MONOCYTES # BLD AUTO: 0.47 X10(3) UL (ref 0.1–1)
MONOCYTES NFR BLD AUTO: 6.5 %
NEUTROPHILS # BLD AUTO: 4.49 X10 (3) UL (ref 1.5–7.7)
NEUTROPHILS # BLD AUTO: 4.49 X10(3) UL (ref 1.5–7.7)
NEUTROPHILS NFR BLD AUTO: 62 %
NONHDLC SERPL-MCNC: 158 MG/DL (ref ?–130)
OSMOLALITY SERPL CALC.SUM OF ELEC: 296 MOSM/KG (ref 275–295)
PLATELET # BLD AUTO: 227 10(3)UL (ref 150–450)
POTASSIUM SERPL-SCNC: 4.3 MMOL/L (ref 3.5–5.1)
PROT SERPL-MCNC: 7.7 G/DL (ref 5.7–8.2)
RBC # BLD AUTO: 4.88 X10(6)UL
SODIUM SERPL-SCNC: 142 MMOL/L (ref 136–145)
TRIGL SERPL-MCNC: 110 MG/DL (ref 30–149)
TSI SER-ACNC: 2.14 MIU/ML (ref 0.55–4.78)
VIT B12 SERPL-MCNC: 299 PG/ML (ref 211–911)
VIT D+METAB SERPL-MCNC: 26.8 NG/ML (ref 30–100)
VLDLC SERPL CALC-MCNC: 20 MG/DL (ref 0–30)
WBC # BLD AUTO: 7.2 X10(3) UL (ref 4–11)

## 2024-09-04 PROCEDURE — 82306 VITAMIN D 25 HYDROXY: CPT

## 2024-09-04 PROCEDURE — 84443 ASSAY THYROID STIM HORMONE: CPT

## 2024-09-04 PROCEDURE — 82607 VITAMIN B-12: CPT

## 2024-09-04 PROCEDURE — 80061 LIPID PANEL: CPT

## 2024-09-04 PROCEDURE — 85025 COMPLETE CBC W/AUTO DIFF WBC: CPT

## 2024-09-04 PROCEDURE — 99396 PREV VISIT EST AGE 40-64: CPT | Performed by: FAMILY MEDICINE

## 2024-09-04 PROCEDURE — 80053 COMPREHEN METABOLIC PANEL: CPT

## 2024-09-04 PROCEDURE — 36415 COLL VENOUS BLD VENIPUNCTURE: CPT

## 2024-09-04 NOTE — PROGRESS NOTES
HPI:   Shalini Cruz is a 54 year old female who presents for a complete physical exam.    Here for follow up. Reports feeling horrible on letrozole - been 3 years.  Not exercising like before but plans on it. Taking biotin.   Wt Readings from Last 3 Encounters:   09/04/24 142 lb 3.2 oz (64.5 kg)   06/11/24 144 lb (65.3 kg)   06/05/24 144 lb 3.2 oz (65.4 kg)     Body mass index is 23.66 kg/m².       Current Outpatient Medications   Medication Sig Dispense Refill    naproxen 500 MG Oral Tab Take 1 tablet (500 mg total) by mouth 2 (two) times daily with meals. Take for 2 weeks as directed and then as needed. 60 tablet 0    cyclobenzaprine 5 MG Oral Tab 5 mg 1-2 tablets three times per day as needed for spasms. Do not operate heavy machinery while on this medication as it may make you sleepy 90 tablet 0    Omega-3 Fatty Acids (OMEGA 3 500 OR) Take by mouth.      letrozole 2.5 MG Oral Tab Take 1 tablet (2.5 mg total) by mouth daily. (Patient taking differently: Take 1 tablet (2.5 mg total) by mouth every morning.) 90 tablet 3    Cholecalciferol (VITAMIN D) 50 MCG (2000 UT) Oral Tab Take by mouth.      Docusate Sodium (STOOL SOFTENER OR) Take by mouth every other day.      Biotin 1000 MCG Oral Tab Take 1,000 mcg by mouth daily.      Ascorbic Acid (VITAMIN C) 1000 MG Oral Tab Take 1 tablet (1,000 mg total) by mouth daily.        Past Medical History:    Back problem    herniated disc    Breast cancer (HCC)    left breast    Cataract    Colon polyps    Diverticulosis    Esophageal reflux    Gastritis    Hiatal hernia    Hx of motion sickness    Microscopic hematuria    Migraines    menstrual    PONV (postoperative nausea and vomiting)    Visual impairment    completed Cataracts surgeries 8/30/19 & 9/20/19      Past Surgical History:   Procedure Laterality Date    Breast reconstruction  02/2022    Cataract      Completed Cataracts surgery 8/30/19 & 9/20/19    Colonoscopy      Colonoscopy N/A 06/08/2018    Procedure:  COLONOSCOPY;  Surgeon: John Silva MD;  Location: [facility] ENDOSCOPY    Colonoscopy N/A 06/27/2023    Procedure: COLONOSCOPY;  Surgeon: John Silva MD;  Location: [facility] ENDO    Cyst aspiration left  09/2016    Sheila biopsy stereo nodule 1 site left (cpt=19081)  10/19/2021    2 site left calcs top hat and cork    Mastectomy left Left 12/2021    One Lymph Node Removed    Mastectomy right  12/2021    Other      Rhinoplasty    Other surgical history  04/30/2024    s/p hysteroscopic polypectomy / currettage    Removal of ovarian cyst(s)  2009    laparascopic right ov cystectomy, paratubal cyst drainage      Family History   Problem Relation Age of Onset    Breast Cancer Mother 75    Neurological Disorder Mother         Cerebral Aneurysm    Dementia Mother         Due to brain aneurysm 20 years prior    Cancer Mother     Heart Disease Father         CAD    Prostate Cancer Father 85    Cancer Father     Diabetes Brother     Schizophrenia Brother     Other (cerebral hemmorage) Brother     Breast Cancer Paternal Aunt 84    No Known Problems Sister     Colon Cancer Paternal Grandmother     Colon Cancer Paternal Cousin Male 57      Social History:   Social History     Socioeconomic History    Marital status: Single   Tobacco Use    Smoking status: Never    Smokeless tobacco: Never   Vaping Use    Vaping status: Never Used   Substance and Sexual Activity    Alcohol use: Yes     Alcohol/week: 5.0 standard drinks of alcohol     Types: 3 Glasses of wine, 2 Shots of liquor per week     Comment: occasional    Drug use: No    Sexual activity: Yes   Other Topics Concern    Caffeine Concern Yes     Comment: coffee          REVIEW OF SYSTEMS:   GENERAL: feels well otherwise  Review of Systems   See HPI   EXAM:   /77   Pulse 65   Ht 5' 5\" (1.651 m)   Wt 142 lb 3.2 oz (64.5 kg)   LMP 03/14/2024 (Exact Date)   BMI 23.66 kg/m²     GENERAL: well developed, well nourished,in no apparent distress  SKIN: no rashes,no suspicious  lesions  HEENT: atraumatic, normocephalic,ears and throat are clear  EYES:PERRLA, EOMI, conjunctiva are clear  LUNGS: clear to auscultation  CARDIO: RRR without murmur  GI: good BS's,no masses, HSM or tenderness  EXTREMITIES: no cyanosis, clubbing or edema  NEURO: Oriented times three,cranial nerves are intact,motor and sensory are grossly intact    ASSESSMENT AND PLAN:   Shalini Cruz is a 54 year old female who presents for a complete physical exam.    1. Routine medical exam  Incorporate weight training and more protein.   - CBC With Differential With Platelet; Future  - Comp Metabolic Panel (14); Future  - Lipid Panel; Future  - TSH W Reflex To Free T4; Future  - Vitamin B12; Future  - Vitamin D; Future    2. History of infiltrating ductal carcinoma of breast  Per oncology.     3. Post-menopausal      4. Vitamin D deficiency    - Vitamin B12; Future  - Vitamin D; Future    Yudy Jaimes MD  9/4/2024  8:34 AM

## 2024-09-05 ENCOUNTER — APPOINTMENT (OUTPATIENT)
Dept: PHYSICAL THERAPY | Age: 54
End: 2024-09-05
Attending: PHYSICAL MEDICINE & REHABILITATION
Payer: COMMERCIAL

## 2024-10-17 ENCOUNTER — OFFICE VISIT (OUTPATIENT)
Dept: OBGYN CLINIC | Facility: CLINIC | Age: 54
End: 2024-10-17
Payer: COMMERCIAL

## 2024-10-17 VITALS
SYSTOLIC BLOOD PRESSURE: 114 MMHG | DIASTOLIC BLOOD PRESSURE: 70 MMHG | WEIGHT: 143 LBS | HEART RATE: 65 BPM | BODY MASS INDEX: 24.41 KG/M2 | HEIGHT: 64 IN

## 2024-10-17 DIAGNOSIS — Z01.419 ENCOUNTER FOR GYNECOLOGICAL EXAMINATION WITHOUT ABNORMAL FINDING: Primary | ICD-10-CM

## 2024-10-17 PROCEDURE — 99396 PREV VISIT EST AGE 40-64: CPT | Performed by: OBSTETRICS & GYNECOLOGY

## 2024-10-17 NOTE — PROGRESS NOTES
Shalini Cruz is a 54 year old female  Patient's last menstrual period was 2024 (exact date).   Chief Complaint   Patient presents with    Gyn Exam     ANNUAL EXAM -- on letrozole for hx breast cancer -- on year 3 of . Bad hot flashes/ joint pain / weight gain. Wishes to discuss if HRT possible. Sister on patch & loves it.    .   OBSTETRICS HISTORY:     OB History    Para Term  AB Living   0 0 0 0 0 0   SAB IAB Ectopic Multiple Live Births   0 0 0 0 0       GYNE HISTORY:     Periods none due to menopause        Latest Ref Rng & Units 10/19/2023     9:22 AM 2020     9:20 AM   RECENT PAP RESULTS   INTERPRETATION/RESULT: Negative for intraepithelial lesion or malignancy Negative for intraepithelial lesion or malignancy  Negative for intraepithelial lesion or malignancy    HPV Negative Negative  Negative          MEDICAL HISTORY:     Past Medical History:    Back problem    herniated disc    Breast cancer (HCC)    left breast    Cataract    Colon polyps    Diverticulosis    Esophageal reflux    Gastritis    Hiatal hernia    Hx of motion sickness    Microscopic hematuria    Migraines    menstrual    PONV (postoperative nausea and vomiting)    Visual impairment    completed Cataracts surgeries 19 & 19     Past Surgical History:   Procedure Laterality Date    Breast reconstruction  2022    Cataract      Completed Cataracts surgery 19 & 19    Colonoscopy      Colonoscopy N/A 2018    Procedure: COLONOSCOPY;  Surgeon: John Silva MD;  Location: [facility] ENDOSCOPY    Colonoscopy N/A 2023    Procedure: COLONOSCOPY;  Surgeon: John Silva MD;  Location: [facility] ENDO    Cyst aspiration left  2016    Sheila biopsy stereo nodule 1 site left (cpt=19081)  10/19/2021    2 site left calcs top hat and cork    Mastectomy left Left 2021    One Lymph Node Removed    Mastectomy right  2021    Other      Rhinoplasty    Other surgical history  2024    s/p  hysteroscopic polypectomy / currettage    Removal of ovarian cyst(s)  2009    laparascopic right ov cystectomy, paratubal cyst drainage     OB History    Para Term  AB Living   0 0 0 0 0 0   SAB IAB Ectopic Multiple Live Births   0 0 0 0 0        SOCIAL HISTORY:     Social History     Socioeconomic History    Marital status: Single     Spouse name: Not on file    Number of children: Not on file    Years of education: Not on file    Highest education level: Not on file   Occupational History    Not on file   Tobacco Use    Smoking status: Never    Smokeless tobacco: Never   Vaping Use    Vaping status: Never Used   Substance and Sexual Activity    Alcohol use: Yes     Alcohol/week: 5.0 standard drinks of alcohol     Types: 3 Glasses of wine, 2 Shots of liquor per week     Comment: occasional    Drug use: No    Sexual activity: Yes   Other Topics Concern     Service Not Asked    Blood Transfusions Not Asked    Caffeine Concern Yes     Comment: coffee    Occupational Exposure Not Asked    Hobby Hazards Not Asked    Sleep Concern Not Asked    Stress Concern Not Asked    Weight Concern Not Asked    Special Diet Not Asked    Back Care Not Asked    Exercise Not Asked    Bike Helmet Not Asked    Seat Belt Not Asked    Self-Exams Not Asked   Social History Narrative    Not on file     Social Drivers of Health     Financial Resource Strain: Not on file   Food Insecurity: Not on file   Transportation Needs: Not on file   Physical Activity: Not on file   Stress: Not on file   Social Connections: Not on file   Housing Stability: Not on file       FAMILY HISTORY:     Family History   Problem Relation Age of Onset    Breast Cancer Mother 75    Neurological Disorder Mother         Cerebral Aneurysm    Dementia Mother         Due to brain aneurysm 20 years prior    Cancer Mother     Heart Disease Father         CAD    Prostate Cancer Father 85    Cancer Father     Diabetes Brother     Schizophrenia Brother      Other (cerebral hemmorage) Brother     Breast Cancer Paternal Aunt 84    No Known Problems Sister     Colon Cancer Paternal Grandmother     Colon Cancer Paternal Cousin Male 57       MEDICATIONS:       Current Outpatient Medications:     naproxen 500 MG Oral Tab, Take 1 tablet (500 mg total) by mouth 2 (two) times daily with meals. Take for 2 weeks as directed and then as needed., Disp: 60 tablet, Rfl: 0    cyclobenzaprine 5 MG Oral Tab, 5 mg 1-2 tablets three times per day as needed for spasms. Do not operate heavy machinery while on this medication as it may make you sleepy, Disp: 90 tablet, Rfl: 0    Omega-3 Fatty Acids (OMEGA 3 500 OR), Take by mouth., Disp: , Rfl:     letrozole 2.5 MG Oral Tab, Take 1 tablet (2.5 mg total) by mouth daily. (Patient taking differently: Take 1 tablet (2.5 mg total) by mouth every morning.), Disp: 90 tablet, Rfl: 3    Cholecalciferol (VITAMIN D) 50 MCG (2000 UT) Oral Tab, Take by mouth., Disp: , Rfl:     Docusate Sodium (STOOL SOFTENER OR), Take by mouth every other day., Disp: , Rfl:     Biotin 1000 MCG Oral Tab, Take 1,000 mcg by mouth daily., Disp: , Rfl:     Ascorbic Acid (VITAMIN C) 1000 MG Oral Tab, Take 1 tablet (1,000 mg total) by mouth daily., Disp: , Rfl:     ALLERGIES:     Allergies[1]      REVIEW OF SYSTEMS:     Constitutional:    denies fever / chills  Eyes:     denies blurred or double vision  Cardiovascular:  denies chest pain or palpitations  Respiratory:    denies shortness of breath  Gastrointestinal:  denies severe abdominal pain, frequent diarrhea or constipation, nausea / vomiting  Genitourinary:    denies dysuria, bothersome incontinence  Skin/Breast:   denies any breast pain, lumps, or discharge  Neurological:    denies frequent severe headaches  Psychiatric:   denies depression or anxiety, thoughts of harming self or others  Heme/Lymph:    denies easy bruising or bleeding    PHYSICAL EXAM:   Blood pressure 114/70, pulse 65, height 5' 4\" (1.626 m), weight 143  lb (64.9 kg), last menstrual period 03/14/2024.  Constitutional:  well developed, well nourished  Head/Face:  normocephalic  Neck/Thyroid:  thyroid symmetric, no thyromegaly, no nodules, no adenopathy  Lymphatic: no abnormal supraclavicular or axillary adenopathy is noted  Breast:   Bilateral implants -- no abnormality on exam  Respiratory:   nonlabored breathing  Cardiovascular: regular rate and rhythm  Abdomen:   soft, nontender, nondistended, no masses  Skin/Hair: no unusual rashes or bruises  Extremities:  no edema, no cyanosis  Psychiatric:   Oriented to time, place, person and situation. Appropriate mood and affect    Pelvic Exam:  External Genitalia:  normal appearance, hair distribution, and no lesions  Urethral Meatus:   normal in size, location, without lesions and prolapse  Bladder:    no fullness, masses or tenderness  Vagina:    normal appearance without lesions, no abnormal discharge  Cervix:    normal without tenderness on motion  Uterus:    normal in size, contour, position, mobility, without tenderness  Adnexa:   normal without masses or tenderness  Perineum:   normal  Anus:    no hemorroids    ASSESSMENT & PLAN:     Shalini was seen today for gyn exam.    Diagnoses and all orders for this visit:    Encounter for gynecological examination without abnormal finding    Reviewed option of using veozah in detail. Declines.  Reviewed HRT. Defer to oncologist with her breast cancer hx on safety.  Hopefully less Sx once off letrozole. Would not recommend stopping if oncolgist does not agree.      SUMMARY:    Pap: Next cotest 10/26-28 per ASCCP guidelines.    Mammogram: No    BCM: Postmenopausal    STD screening: declines    Colon cancer screening: UTD    Misc: Calcium needs reviewed (1500 mg diet + supplement). Weight bearing exercise encouraged. Call if any VB (if perimenopausal, reviewed abn VB patterns)    HM updated    Depression screen:   Depression Screening (PHQ-2/PHQ-9): Over the LAST 2 WEEKS   Little  interest or pleasure in doing things (over the last two weeks)?: Not at all    Feeling down, depressed, or hopeless (over the last two weeks)?: Not at all    PHQ-2 SCORE: 0          FOLLOW-UP     Return in about 1 year (around 10/17/2025) for annual gyne exam.    Note to patient and family:  The 21st Century Cures Act makes medical notes available to patients in the interest of transparency.  However, please be advised that this is a medical document.  It is intended as a peer to peer communication.  It is written in medical language and may contain abbreviations or verbiage that are technical and unfamiliar.  It may appear blunt or direct.  Medical documents are intended to carry relevant information, facts as evident, and the clinical opinion of the practitioner.       [1]   Allergies  Allergen Reactions    Iodine [Radiology Contrast Iodinated Dyes] HIVES and RASH    Penicillins HIVES and RASH     No skin blisters or organ involvement

## 2024-11-13 ENCOUNTER — OFFICE VISIT (OUTPATIENT)
Dept: HEMATOLOGY/ONCOLOGY | Facility: HOSPITAL | Age: 54
End: 2024-11-13
Attending: INTERNAL MEDICINE
Payer: COMMERCIAL

## 2024-11-13 VITALS
BODY MASS INDEX: 25.06 KG/M2 | TEMPERATURE: 98 F | HEIGHT: 64 IN | OXYGEN SATURATION: 97 % | HEART RATE: 73 BPM | RESPIRATION RATE: 16 BRPM | SYSTOLIC BLOOD PRESSURE: 113 MMHG | WEIGHT: 146.81 LBS | DIASTOLIC BLOOD PRESSURE: 71 MMHG

## 2024-11-13 DIAGNOSIS — Z17.0 MALIGNANT NEOPLASM OF UPPER-OUTER QUADRANT OF LEFT BREAST IN FEMALE, ESTROGEN RECEPTOR POSITIVE (HCC): Primary | ICD-10-CM

## 2024-11-13 DIAGNOSIS — E55.9 VITAMIN D DEFICIENCY: ICD-10-CM

## 2024-11-13 DIAGNOSIS — Z00.00 ROUTINE MEDICAL EXAM: ICD-10-CM

## 2024-11-13 DIAGNOSIS — Z79.811 ENCOUNTER FOR MONITORING AROMATASE INHIBITOR THERAPY: ICD-10-CM

## 2024-11-13 DIAGNOSIS — C50.412 MALIGNANT NEOPLASM OF UPPER-OUTER QUADRANT OF LEFT BREAST IN FEMALE, ESTROGEN RECEPTOR POSITIVE (HCC): Primary | ICD-10-CM

## 2024-11-13 DIAGNOSIS — Z85.3 ENCOUNTER FOR FOLLOW-UP SURVEILLANCE OF BREAST CANCER: ICD-10-CM

## 2024-11-13 DIAGNOSIS — Z51.81 ENCOUNTER FOR MONITORING AROMATASE INHIBITOR THERAPY: ICD-10-CM

## 2024-11-13 DIAGNOSIS — Z08 ENCOUNTER FOR FOLLOW-UP SURVEILLANCE OF BREAST CANCER: ICD-10-CM

## 2024-11-13 PROCEDURE — 99214 OFFICE O/P EST MOD 30 MIN: CPT | Performed by: INTERNAL MEDICINE

## 2024-11-13 RX ORDER — FEZOLINETANT 45 MG/1
45 TABLET, FILM COATED ORAL DAILY
Qty: 30 TABLET | Refills: 11 | Status: CANCELLED | OUTPATIENT
Start: 2024-11-13

## 2024-11-13 RX ORDER — LETROZOLE 2.5 MG/1
2.5 TABLET, FILM COATED ORAL EVERY MORNING
Qty: 90 TABLET | Refills: 3 | Status: SHIPPED | OUTPATIENT
Start: 2024-11-13

## 2024-11-13 NOTE — PROGRESS NOTES
HPI     Shalini Cruz is a 54 year old female with for follow up of   Encounter Diagnoses   Name Primary?    Malignant neoplasm of upper-outer quadrant of left breast in female, estrogen receptor positive (HCC) Yes    Encounter for monitoring aromatase inhibitor therapy     Encounter for follow-up surveillance of breast cancer        Patient s/p bilateral mastectomies on 12/1/2021.  She had immediate reconstructions and sentinel lymph node biopsy on the left.    Taking letrozole daily and c/o hot flashes, arthralgias hip and knees.  Also concerned about weight gain.      Patient met with her gynecologist on 10/17/2024 she was concerned about the hot flashes, joint pain and weight gain.  She discussed with her GYN HRT for the hot flashes, stated her sister was on the hormone patch and was doing great on it.  Her gynecologist referred to me given her diagnosis of ER positive breast cancer for which she is on an AI.    She also met with her PCP on 9/4/2024, and discussed weight training and more protein in the diet for weight loss.    Has gained 2 lbs.      ECOG PS 0    Review of Systems:   Review of Systems   Constitutional:  Positive for fatigue. Negative for appetite change and unexpected weight change (weight gain).   Respiratory:  Negative for cough and shortness of breath.    Cardiovascular:  Negative for chest pain.   Gastrointestinal:  Negative for abdominal pain.   Endocrine: Positive for hot flashes.   Genitourinary:  Negative for dysuria and frequency.         Some urgency   Musculoskeletal:  Positive for arthralgias and back pain (back problems). Negative for neck pain.   Neurological:  Negative for dizziness and headaches.   Hematological:  Negative for adenopathy.   Psychiatric/Behavioral:  Positive for sleep disturbance.          Current Outpatient Medications   Medication Sig Dispense Refill    naproxen 500 MG Oral Tab Take 1 tablet (500 mg total) by mouth 2 (two) times daily with meals. Take for  2 weeks as directed and then as needed. 60 tablet 0    cyclobenzaprine 5 MG Oral Tab 5 mg 1-2 tablets three times per day as needed for spasms. Do not operate heavy machinery while on this medication as it may make you sleepy 90 tablet 0    Omega-3 Fatty Acids (OMEGA 3 500 OR) Take by mouth.      letrozole 2.5 MG Oral Tab Take 1 tablet (2.5 mg total) by mouth daily. (Patient taking differently: Take 1 tablet (2.5 mg total) by mouth every morning.) 90 tablet 3    Cholecalciferol (VITAMIN D) 50 MCG (2000 UT) Oral Tab Take by mouth.      Docusate Sodium (STOOL SOFTENER OR) Take by mouth every other day.      Biotin 1000 MCG Oral Tab Take 1,000 mcg by mouth daily.      Ascorbic Acid (VITAMIN C) 1000 MG Oral Tab Take 1 tablet (1,000 mg total) by mouth daily.       Allergies:   Allergies   Allergen Reactions    Iodine [Radiology Contrast Iodinated Dyes] HIVES and RASH    Penicillins HIVES and RASH     No skin blisters or organ involvement       Past Medical History:    Back problem    herniated disc    Breast cancer (HCC)    left breast    Cataract    Colon polyps    Diverticulosis    Esophageal reflux    Gastritis    Hiatal hernia    Hx of motion sickness    Microscopic hematuria    Migraines    menstrual    PONV (postoperative nausea and vomiting)    Visual impairment    completed Cataracts surgeries 8/30/19 & 9/20/19     Past Surgical History:   Procedure Laterality Date    Breast reconstruction  02/2022    Cataract      Completed Cataracts surgery 8/30/19 & 9/20/19    Colonoscopy      Colonoscopy N/A 06/08/2018    Procedure: COLONOSCOPY;  Surgeon: John Silva MD;  Location: [facility] ENDOSCOPY    Colonoscopy N/A 06/27/2023    Procedure: COLONOSCOPY;  Surgeon: John Silva MD;  Location: [facility] ENDO    Cyst aspiration left  09/2016    Sheila biopsy stereo nodule 1 site left (cpt=19081)  10/19/2021    2 site left calcs top hat and cork    Mastectomy left Left 12/2021    One Lymph Node Removed    Mastectomy right   12/2021    Other      Rhinoplasty    Other surgical history  04/30/2024    s/p hysteroscopic polypectomy / currettage    Removal of ovarian cyst(s)  2009    laparascopic right ov cystectomy, paratubal cyst drainage     Social History     Socioeconomic History    Marital status: Single   Tobacco Use    Smoking status: Never    Smokeless tobacco: Never   Vaping Use    Vaping status: Never Used   Substance and Sexual Activity    Alcohol use: Yes     Alcohol/week: 5.0 standard drinks of alcohol     Types: 3 Glasses of wine, 2 Shots of liquor per week     Comment: occasional    Drug use: No    Sexual activity: Yes   Other Topics Concern    Caffeine Concern Yes     Comment: coffee       Family History   Problem Relation Age of Onset    Breast Cancer Mother 75    Neurological Disorder Mother         Cerebral Aneurysm    Dementia Mother         Due to brain aneurysm 20 years prior    Cancer Mother     Heart Disease Father         CAD    Prostate Cancer Father 85    Cancer Father     Diabetes Brother     Schizophrenia Brother     Other (cerebral hemmorage) Brother     Breast Cancer Paternal Aunt 84    No Known Problems Sister     Colon Cancer Paternal Grandmother     Colon Cancer Paternal Cousin Male 57         PHYSICAL EXAM:    /71 (BP Location: Right arm, Patient Position: Sitting, Cuff Size: adult)   Pulse 73   Temp 98.4 °F (36.9 °C) (Oral)   Resp 16   Ht 1.626 m (5' 4\")   Wt 66.6 kg (146 lb 12.8 oz)   LMP 03/14/2024 (Exact Date)   SpO2 97%   BMI 25.20 kg/m²   Wt Readings from Last 6 Encounters:   11/13/24 66.6 kg (146 lb 12.8 oz)   10/17/24 64.9 kg (143 lb)   09/04/24 64.5 kg (142 lb 3.2 oz)   06/11/24 65.3 kg (144 lb)   06/05/24 65.4 kg (144 lb 3.2 oz)   05/16/24 65.4 kg (144 lb 3.2 oz)     Physical Exam  General: Patient is alert, not in acute distress.  HEENT: EOMs intact. PERRL.   Neck: No JVD. No palpable lymphadenopathy. Neck is supple.  Chest: Clear to auscultation.  Breasts:  B mastectomies with  reconstructions.   Heart: Regular rate and rhythm.   Abdomen: Soft, non tender with good bowel sounds.  Extremities: No edema.  Neurological: Grossly intact.   Lymphatics: There is no palpable lymphadenopathy throughout in the cervical, supraclavicular, axillary, or inguinal regions.  Psych/Depression: nl        ASSESSMENT/PLAN:     1. Malignant neoplasm of upper-outer quadrant of left breast in female, estrogen receptor positive (HCC)    2. Encounter for monitoring aromatase inhibitor therapy    3. Encounter for follow-up surveillance of breast cancer         Cancer Staging   Malignant neoplasm of upper-outer quadrant of left breast in female, estrogen receptor positive (HCC)  Staging form: Breast, AJCC 8th Edition  - Clinical stage from 10/29/2021: Stage IIA (cT2, cN0, cM0, G3, ER+, LA+, HER2-) - Signed by Lester Edmondson MD on 10/29/2021  - Pathologic stage from 12/30/2021: Stage IA (pT2, pN0(sn), cM0, G2, ER+, LA+, HER2-, Oncotype DX score: 2) - Signed by Lester Edmondson MD on 12/30/2021    Shalini Cruz is a 54 year old female with ER/LA positive breast cancer, node negative.      Given that the patient had bilateral mastectomy she completed her local regional control and did not need radiation therapy.    Oncotype score 2, low risk or recurrence.   Discussed with the patient that per data from TAILOR Rx trial distant recurrence risk at 9 years with 5 years of adjuvant hormonal therapy is 3%.  Discussed that the group average absolute chemotherapy benefit is less than 1%.  No additional benefit from adjuvant chemotherapy.  Will proceed with adjuvant hormonal therapy alone.    D/w patient data published regarding compliance with adjuvant hormonal therapy that women who stopped taking hormonal therapy early were 35% to 61% more likely to have a recurrence than women who didn’t stop taking the medicine early.    Patient is to complete a total of 5 years of adjuvant hormonal therapy (until January 2027).  Now  on letrozole.  Not tolerating well due to hot flashes and arthralgias.  Considering stopping treatment.  D/w patient again data above.  Offered to switch to exemestane, but she will continue letrozole.  Discussed with the patient initiation of the Veozah for management of hot flashes.    Joint aches:  Exercise    BMI 25: Will refer to our weight loss clinic.    Bone health:  baseline DEXA on 9/2023 minimal osteopenia, repeat in Sept of 2025.  Patient had vitamin D level 9/4/2024, she has vitamin D insufficiency.  She was recommend to take 2000IU daily.  Will repeat levels in 6 months.  Patient has calcium level elevated by 0.1 above the upper limit of normal.  This will be monitored by primary care doctor.      [facility] moderate    No orders of the defined types were placed in this encounter.      Results From Past 48 Hours:  No results found for this or any previous visit (from the past 48 hours).      Imaging & Referrals:  None   Component      Latest Ref Rng 9/4/2024   WBC      4.0 - 11.0 x10(3) uL 7.2    RBC      3.80 - 5.30 x10(6)uL 4.88    Hemoglobin      12.0 - 16.0 g/dL 15.9    Hematocrit      35.0 - 48.0 % 44.3    MCV      80.0 - 100.0 fL 90.8    MCH      26.0 - 34.0 pg 32.6    MCHC      31.0 - 37.0 g/dL 35.9    RDW-SD      35.1 - 46.3 fL 40.0    RDW      11.0 - 15.0 % 11.9    Platelet Count      150.0 - 450.0 10(3)uL 227.0    Prelim Neutrophil Abs      1.50 - 7.70 x10 (3) uL 4.49    Neutrophils Absolute      1.50 - 7.70 x10(3) uL 4.49    Lymphocytes Absolute      1.00 - 4.00 x10(3) uL 2.03    Monocytes Absolute      0.10 - 1.00 x10(3) uL 0.47    Eosinophils Absolute      0.00 - 0.70 x10(3) uL 0.18    Basophils Absolute      0.00 - 0.20 x10(3) uL 0.04    Immature Granulocyte Absolute      0.00 - 1.00 x10(3) uL 0.02    Neutrophils %      % 62.0    Lymphocytes %      % 28.1    Monocytes %      % 6.5    Eosinophils %      % 2.5    Basophils %      % 0.6    Immature Granulocyte %      % 0.3    Glucose      70 - 99  mg/dL 96    Sodium      136 - 145 mmol/L 142    Potassium      3.5 - 5.1 mmol/L 4.3    Chloride      98 - 112 mmol/L 108    Carbon Dioxide, Total      21.0 - 32.0 mmol/L 28.0    ANION GAP      0 - 18 mmol/L 6    BUN      9 - 23 mg/dL 18    CREATININE      0.55 - 1.02 mg/dL 0.71    BUN/CREATININE RATIO      10.0 - 20.0  25.4 (H)    CALCIUM      8.7 - 10.4 mg/dL 10.5 (H)    CALCULATED OSMOLALITY      275 - 295 mOsm/kg 296 (H)    EGFR      >=60 mL/min/1.73m2 100    ALT (SGPT)      10 - 49 U/L 19    AST (SGOT)      <34 U/L 21    ALKALINE PHOSPHATASE      46 - 118 U/L 81    Total Bilirubin      0.3 - 1.2 mg/dL 0.7    PROTEIN, TOTAL      5.7 - 8.2 g/dL 7.7    Albumin      3.2 - 4.8 g/dL 5.0 (H)    Globulin      2.0 - 3.5 g/dL 2.7    A/G Ratio      1.0 - 2.0  1.9    Patient Fasting for CMP? Yes      Component  Ref Range & Units 9/4/24 0902  Vitamin D, 25OH, Total  30.0 - 100.0 ng/mL 26.8 Low   Comment: Literature Recommendations for 25(OH)D levels are:  Range           Vitamin D Status   <20    ng/mL      Deficiency   20-<30 ng/mL      Insufficiency    ng/mL      Sufficiency   >100   ng/mL      Toxicity    *Clinical controversy exists regarding optimal 25(OH)D levels. Emerging evidence links potential adverse effects to high levels, particularly >60 ng/mL.

## 2024-12-17 ENCOUNTER — OFFICE VISIT (OUTPATIENT)
Dept: SURGERY | Facility: CLINIC | Age: 54
End: 2024-12-17
Payer: COMMERCIAL

## 2024-12-17 DIAGNOSIS — Z90.13 ABSENCE OF BREAST, BILATERAL: Primary | ICD-10-CM

## 2024-12-17 PROCEDURE — 99212 OFFICE O/P EST SF 10 MIN: CPT

## 2024-12-17 NOTE — PROGRESS NOTES
Shalini Cruz is a 54 year old female who presents today for a yearly follow-up.  She is without new complaints.  Specifically, she denies any masses, skin changes, or nipple discharge.    Patient has history of bilateral nipple sparing mastectomies with Dr. Peralta - last seen on 6/5/2024 and will follow up annually.     Also s/p  removal of bilateral breast tissue expanders, left breast capsulotomy, placement of silicone gel implants bilateral breasts, autologous fat grafting to bilateral reconstructed breasts with Dr. Page on 2/24/2022.     Physical Exam     There were no vitals filed for this visit.  HEENT: There is no cervical or supraclavicular lymphadenopathy noted.      Breasts: Bilateral breasts are soft without palpable masses.  There is no nipple inversion or nipple discharge noted.  There is no axillary lymphadenopathy noted bilaterally.  There is no erythema or seroma noted.      Assessment and Plan     Patient is doing well.  We reviewed the recommended FDA surveillance protocol for silicone implants.  We discussed continuing regular self breast examination with a plan for follow-up in 1 year or sooner if any changes are detected.  The plan was reviewed with the patient and questions were answered.

## 2025-03-27 ENCOUNTER — PATIENT MESSAGE (OUTPATIENT)
Dept: FAMILY MEDICINE CLINIC | Facility: CLINIC | Age: 55
End: 2025-03-27

## 2025-05-15 ENCOUNTER — OFFICE VISIT (OUTPATIENT)
Age: 55
End: 2025-05-15
Attending: INTERNAL MEDICINE
Payer: COMMERCIAL

## 2025-05-15 VITALS
WEIGHT: 149.19 LBS | OXYGEN SATURATION: 98 % | HEIGHT: 64 IN | BODY MASS INDEX: 25.47 KG/M2 | RESPIRATION RATE: 16 BRPM | DIASTOLIC BLOOD PRESSURE: 84 MMHG | SYSTOLIC BLOOD PRESSURE: 127 MMHG | TEMPERATURE: 98 F | HEART RATE: 62 BPM

## 2025-05-15 DIAGNOSIS — Z79.811 ENCOUNTER FOR MONITORING AROMATASE INHIBITOR THERAPY: ICD-10-CM

## 2025-05-15 DIAGNOSIS — M25.50 AROMATASE INHIBITOR-ASSOCIATED ARTHRALGIA: ICD-10-CM

## 2025-05-15 DIAGNOSIS — Z85.3 ENCOUNTER FOR FOLLOW-UP SURVEILLANCE OF BREAST CANCER: ICD-10-CM

## 2025-05-15 DIAGNOSIS — Z17.0 MALIGNANT NEOPLASM OF UPPER-OUTER QUADRANT OF LEFT BREAST IN FEMALE, ESTROGEN RECEPTOR POSITIVE (HCC): Primary | ICD-10-CM

## 2025-05-15 DIAGNOSIS — T45.1X5A AROMATASE INHIBITOR-INDUCED HOT FLASH: ICD-10-CM

## 2025-05-15 DIAGNOSIS — Z78.0 POST-MENOPAUSAL: ICD-10-CM

## 2025-05-15 DIAGNOSIS — C50.412 MALIGNANT NEOPLASM OF UPPER-OUTER QUADRANT OF LEFT BREAST IN FEMALE, ESTROGEN RECEPTOR POSITIVE (HCC): Primary | ICD-10-CM

## 2025-05-15 DIAGNOSIS — T45.1X5A AROMATASE INHIBITOR-ASSOCIATED ARTHRALGIA: ICD-10-CM

## 2025-05-15 DIAGNOSIS — R23.2 AROMATASE INHIBITOR-INDUCED HOT FLASH: ICD-10-CM

## 2025-05-15 DIAGNOSIS — Z85.3 HISTORY OF INFILTRATING DUCTAL CARCINOMA OF BREAST: ICD-10-CM

## 2025-05-15 DIAGNOSIS — Z51.81 ENCOUNTER FOR MONITORING AROMATASE INHIBITOR THERAPY: ICD-10-CM

## 2025-05-15 DIAGNOSIS — Z08 ENCOUNTER FOR FOLLOW-UP SURVEILLANCE OF BREAST CANCER: ICD-10-CM

## 2025-05-15 NOTE — PROGRESS NOTES
The following individual(s) Shalini Cruz, verbally consented to be recorded using Ambient AI technology and understand that they can withdraw their consent to listening technology at any point by asking the clinician to turn off or pause the recording.

## 2025-05-15 NOTE — PATIENT INSTRUCTIONS
VISIT SUMMARY:  During your visit, we discussed the management of your joint pain, vitamin D deficiency, hot flashes, weight gain, osteopenia, vaginal dryness, and sleep disturbances. We reviewed your current symptoms and treatment options, and made plans for further management and follow-up.    YOUR PLAN:  -JOINT PAIN DUE TO LETROZOLE AND MENOPAUSE: Your joint pain is likely a side effect of letrozole and menopause. We recommend considering acupuncture for relief and researching options covered by your new insurance.    -HOT FLASHES DUE TO MENOPAUSE AND LETROZOLE: Your frequent hot flashes are likely due to menopause and letrozole. Since you prefer not to take additional medications, we acknowledge the contribution of these factors to your symptoms.    -WEIGHT GAIN DUE TO MENOPAUSE AND LETROZOLE: Your weight gain over the past year is likely related to menopause and letrozole. We discussed the importance of continuing letrozole to reduce the risk of cancer recurrence, but we can consider discontinuation if symptoms become intolerable.    -OSTEOPENIA: Osteopenia means you have lower bone density than normal. We have scheduled a DEXA scan for September and recommend continuing weight-bearing exercises to improve your bone density.    -VITAMIN D DEFICIENCY: Vitamin D deficiency can contribute to bone density issues. Continue your current supplementation of 2000 IU daily, and we will check your levels at the end of June. If levels remain low, we may consider a higher dose prescription.    -VAGINAL DRYNESS: Vaginal dryness can lead to long-term issues if untreated. We recommend using non-hormonal vaginal lubricants daily to prevent these issues.    -SLEEP DISTURBANCE: Your sleep disturbances may be managed by avoiding routine use of benzodiazepines like Xanax. Consider using herbal teas with valerian root and chamomile as a sleep aid.    INSTRUCTIONS:  Please follow up with Dr. Jaimes at the end of June to check your  vitamin D levels. Your DEXA scan is scheduled for September. If you have any questions or concerns before your next appointment, please contact our office.    Contains text generated by Maura

## 2025-05-15 NOTE — PROGRESS NOTES
HPI     Shalini Cruz is a 55 year old female with for follow up of   Encounter Diagnoses   Name Primary?    Malignant neoplasm of upper-outer quadrant of left breast in female, estrogen receptor positive (HCC) Yes    Encounter for monitoring aromatase inhibitor therapy     Encounter for follow-up surveillance of breast cancer     Aromatase inhibitor-induced hot flash     Aromatase inhibitor-associated arthralgia      History of Present Illness  She experiences joint pain in her knees, lower calves, feet, fingers, and thumbs. The pain improves with movement but persists during activities such as walking. Her feet hurt at the beginning of walks, and she becomes short of breath, which she attributes to aggravation from the pain. Testing for rheumatoid arthritis was negative.    She has low vitamin D levels and takes 2000 mg daily. She plans to have her levels rechecked at the end of June.    She experiences hot flashes every half hour, which have increased in frequency over the past few weeks. She attributes this to a change in temperature, as she recently traveled to [place]. She also reports a weight gain of five pounds over the past year.    She is concerned about the impact of letrozole and menopause on her bone density, given her history of osteopenia. She engages in weight-bearing exercises, such as using free weights, to help maintain her bone density.    She experiences sleep disturbances, sometimes not sleeping at all. She has occasionally taken Xanax, which helps her sleep, but she does not use it regularly. Over-the-counter sleep aids have been ineffective, and she is considering trying herbal teas for sleep.    No changes at the mastectomy reconstruction sites, though she describes a 'weird' sensation in her chest wall when doing free weights, but no pain. No swelling, lymphedema, or palpable lumps in her neck, clavicle, or armpits.    She reports vaginal dryness but no issues with urinary tract  infections. She is not currently sexually active due to her partner's prostate cancer treatment.      ECOG PS 0    Review of Systems:   Review of Systems   Constitutional:  Positive for fatigue (some times). Negative for appetite change and unexpected weight change (weight gain).   Respiratory:  Positive for shortness of breath (states sometimes feels short winded when going for a walk or  in bed.  Thinks maybe aggravated.). Negative for cough.    Cardiovascular:  Negative for chest pain.   Gastrointestinal:  Negative for abdominal pain.   Endocrine: Positive for hot flashes.   Genitourinary:  Negative for dysuria and frequency.    Musculoskeletal:  Positive for arthralgias and back pain (back problems). Negative for neck pain.   Neurological:  Negative for dizziness and headaches.   Hematological:  Negative for adenopathy.   Psychiatric/Behavioral:  Positive for sleep disturbance.          Current Outpatient Medications   Medication Sig Dispense Refill    letrozole 2.5 MG Oral Tab Take 1 tablet (2.5 mg total) by mouth every morning. 90 tablet 3    naproxen 500 MG Oral Tab Take 1 tablet (500 mg total) by mouth 2 (two) times daily with meals. Take for 2 weeks as directed and then as needed. 60 tablet 0    cyclobenzaprine 5 MG Oral Tab 5 mg 1-2 tablets three times per day as needed for spasms. Do not operate heavy machinery while on this medication as it may make you sleepy 90 tablet 0    Omega-3 Fatty Acids (OMEGA 3 500 OR) Take by mouth.      Cholecalciferol (VITAMIN D) 50 MCG (2000 UT) Oral Tab Take by mouth.      Docusate Sodium (STOOL SOFTENER OR) Take by mouth every other day.      Biotin 1000 MCG Oral Tab Take 1,000 mcg by mouth in the morning.      Ascorbic Acid (VITAMIN C) 1000 MG Oral Tab Take 1 tablet (1,000 mg total) by mouth in the morning.       Allergies:   Allergies   Allergen Reactions    Iodine [Radiology Contrast Iodinated Dyes] HIVES and RASH    Penicillins HIVES and RASH     No skin blisters or  organ involvement       Past Medical History:    Back problem    herniated disc    Breast cancer (HCC)    left breast    Cataract    Colon polyps    Diverticulosis    Esophageal reflux    Gastritis    Hiatal hernia    Hx of motion sickness    Microscopic hematuria    Migraines    menstrual    PONV (postoperative nausea and vomiting)    Visual impairment    completed Cataracts surgeries 8/30/19 & 9/20/19     Past Surgical History:   Procedure Laterality Date    Breast reconstruction  02/2022    Cataract      Completed Cataracts surgery 8/30/19 & 9/20/19    Colonoscopy      Colonoscopy N/A 06/08/2018    Procedure: COLONOSCOPY;  Surgeon: John Silva MD;  Location: [facility] ENDOSCOPY    Colonoscopy N/A 06/27/2023    Procedure: COLONOSCOPY;  Surgeon: John Silva MD;  Location: [facility] ENDO    Cyst aspiration left  09/2016    Sheila biopsy stereo nodule 1 site left (cpt=19081)  10/19/2021    2 site left calcs top hat and cork    Mastectomy left Left 12/2021    One Lymph Node Removed    Mastectomy right  12/2021    Other      Rhinoplasty    Other surgical history  04/30/2024    s/p hysteroscopic polypectomy / currettage    Removal of ovarian cyst(s)  2009    laparascopic right ov cystectomy, paratubal cyst drainage     Social History     Socioeconomic History    Marital status: Single   Tobacco Use    Smoking status: Never    Smokeless tobacco: Never   Vaping Use    Vaping status: Never Used   Substance and Sexual Activity    Alcohol use: Yes     Alcohol/week: 5.0 standard drinks of alcohol     Types: 3 Glasses of wine, 2 Shots of liquor per week     Comment: occasional    Drug use: No    Sexual activity: Yes   Other Topics Concern    Caffeine Concern Yes     Comment: coffee       Family History   Problem Relation Age of Onset    Breast Cancer Mother 75    Neurological Disorder Mother         Cerebral Aneurysm    Dementia Mother         Due to brain aneurysm 20 years prior    Cancer Mother     Heart Disease Father          CAD    Prostate Cancer Father 85    Cancer Father     Diabetes Brother     Schizophrenia Brother     Other (cerebral hemmorage) Brother     Breast Cancer Paternal Aunt 84    No Known Problems Sister     Colon Cancer Paternal Grandmother     Colon Cancer Paternal Cousin Male 57         PHYSICAL EXAM:    /84 (BP Location: Right arm, Patient Position: Sitting, Cuff Size: adult)   Pulse 62   Temp 98.1 °F (36.7 °C) (Tympanic)   Resp 16   Ht 1.626 m (5' 4\")   Wt 67.7 kg (149 lb 3.2 oz)   LMP 03/14/2024 (Exact Date)   SpO2 98%   BMI 25.61 kg/m²   Wt Readings from Last 6 Encounters:   05/15/25 67.7 kg (149 lb 3.2 oz)   11/13/24 66.6 kg (146 lb 12.8 oz)   10/17/24 64.9 kg (143 lb)   09/04/24 64.5 kg (142 lb 3.2 oz)   06/11/24 65.3 kg (144 lb)   06/05/24 65.4 kg (144 lb 3.2 oz)     Physical Exam  General: Patient is alert, not in acute distress.  HEENT: EOMs intact. PERRL.   Neck: No JVD. No palpable lymphadenopathy. Neck is supple.  Chest: Clear to auscultation.  Breasts:  B mastectomies with reconstructions.   Heart: Regular rate and rhythm.   Abdomen: Soft, non tender with good bowel sounds.  Extremities: No edema.  Neurological: Grossly intact.   Lymphatics: There is no palpable lymphadenopathy throughout in the cervical, supraclavicular, axillary, or inguinal regions.  Psych/Depression: nl        ASSESSMENT/PLAN:     1. Malignant neoplasm of upper-outer quadrant of left breast in female, estrogen receptor positive (HCC)    2. Encounter for monitoring aromatase inhibitor therapy    3. Encounter for follow-up surveillance of breast cancer    4. Aromatase inhibitor-induced hot flash    5. Aromatase inhibitor-associated arthralgia         Cancer Staging   Malignant neoplasm of upper-outer quadrant of left breast in female, estrogen receptor positive (HCC)  Staging form: Breast, AJCC 8th Edition  - Clinical stage from 10/29/2021: Stage IIA (cT2, cN0, cM0, G3, ER+, SD+, HER2-) - Signed by Lester Edmondson MD on  10/29/2021  - Pathologic stage from 12/30/2021: Stage IA (pT2, pN0(sn), cM0, G2, ER+, FL+, HER2-, Oncotype DX score: 2) - Signed by Lester Edmondson MD on 12/30/2021    Assessment & Plan  Given that the patient had bilateral mastectomy she completed her local regional control and did not need radiation therapy.    Oncotype score 2, low risk or recurrence.   Discussed with the patient that per data from TAILOR Rx trial distant recurrence risk at 9 years with 5 years of adjuvant hormonal therapy is 3%.  Discussed that the group average absolute chemotherapy benefit is less than 1%.  No additional benefit from adjuvant chemotherapy.  Will proceed with adjuvant hormonal therapy alone.    D/w patient data published regarding compliance with adjuvant hormonal therapy that women who stopped taking hormonal therapy early were 35% to 61% more likely to have a recurrence than women who didn’t stop taking the medicine early.    Patient is to complete a total of 5 years of adjuvant hormonal therapy (until January 2027).  Now on letrozole.  Not tolerating well due to hot flashes and arthralgias.  Considering stopping treatment.  D/w patient again data above.  Offered to switch to exemestane, but she will continue letrozole.      Joint pain due to letrozole and menopause  Joint pain in knees, lower calves, feet, fingers, and thumbs, improving with movement but persistent. Rheumatoid arthritis ruled out. Joint pain is a known side effect of letrozole and menopause. Acupuncture has shown efficacy in reducing joint pain in patients on aromatase inhibitors.  - Consider acupuncture for joint pain relief.  - Research acupuncture options covered by new insurance.    Hot flashes due to menopause and letrozole  Frequent hot flashes, recently worsened. Prefers not to take additional medications. Acknowledges contribution of menopause and letrozole to symptoms.    Weight gain due to menopause and letrozole  Weight gain of 5 pounds over the  past year, likely related to menopause and letrozole. Concerned about long-term effects of letrozole and considering discontinuation. Discussed risk of distant cancer recurrence if letrozole is not taken as prescribed, with a recurrence risk of 3% if compliant and 35-61% if non-compliant.  - Discuss potential discontinuation of letrozole if symptoms become intolerable.    Osteopenia  Osteopenia present, with a DEXA scan scheduled for September. Letrozole and vitamin D deficiency can contribute to decreased bone density. Weight-bearing exercises are beneficial for bone density improvement.  - Order DEXA scan for September.  - Continue weight-bearing exercises to improve bone density.    Vitamin D deficiency  Vitamin D deficiency noted on previous visit. Current supplementation is 2000 IU daily. Vitamin D deficiency can contribute to bone density issues and is not related to letrozole. Low sun exposure is a contributing factor.  - Continue current vitamin D supplementation of 2000 IU daily.  - Check vitamin D levels at the end of June during appointment with Dr. Jaimes.  - If levels remain low, consider prescription vitamin D 50,000 IU weekly for 12 weeks.    Vaginal dryness  Experiencing vaginal dryness, but no significant issues currently. Aware of potential long-term consequences of untreated dryness. Non-hormonal vaginal lubricants recommended to prevent long-term issues.  - Provide list of non-hormonal vaginal lubricants for daily use to prevent long-term issues.    Sleep disturbance  Experiencing sleep disturbances, sometimes using Xanax from a friend. Recommended not to use benzodiazepines regularly due to dependency risks. Melatonin is used nightly with some benefit. Discussed alternative sleep aids such as herbal teas with valerian root and chamomile.  - Avoid routine use of benzodiazepines for sleep.  - Consider herbal teas with valerian root and chamomile for sleep aid.    Recording duration: 28  minutes        MDM moderate  Continued complex medical care    No orders of the defined types were placed in this encounter.      Results From Past 48 Hours:  No results found for this or any previous visit (from the past 48 hours).      Imaging & Referrals:  None

## 2025-06-11 ENCOUNTER — OFFICE VISIT (OUTPATIENT)
Facility: CLINIC | Age: 55
End: 2025-06-11
Payer: COMMERCIAL

## 2025-06-11 VITALS
HEART RATE: 64 BPM | SYSTOLIC BLOOD PRESSURE: 104 MMHG | BODY MASS INDEX: 26 KG/M2 | WEIGHT: 150 LBS | OXYGEN SATURATION: 98 % | DIASTOLIC BLOOD PRESSURE: 70 MMHG

## 2025-06-11 DIAGNOSIS — Z90.13 ABSENCE OF BREAST, BILATERAL: ICD-10-CM

## 2025-06-11 DIAGNOSIS — Z17.0 MALIGNANT NEOPLASM OF UPPER-OUTER QUADRANT OF LEFT BREAST IN FEMALE, ESTROGEN RECEPTOR POSITIVE (HCC): Primary | ICD-10-CM

## 2025-06-11 DIAGNOSIS — C50.412 MALIGNANT NEOPLASM OF UPPER-OUTER QUADRANT OF LEFT BREAST IN FEMALE, ESTROGEN RECEPTOR POSITIVE (HCC): Primary | ICD-10-CM

## 2025-06-11 PROCEDURE — 3074F SYST BP LT 130 MM HG: CPT | Performed by: SURGERY

## 2025-06-11 PROCEDURE — 99213 OFFICE O/P EST LOW 20 MIN: CPT | Performed by: SURGERY

## 2025-06-11 PROCEDURE — 3078F DIAST BP <80 MM HG: CPT | Performed by: SURGERY

## 2025-06-12 NOTE — PROGRESS NOTES
Breast Surgery Surveillance Visit     Diagnosis: Left breast cancer status post bilateral nipple sparing mastectomies with left sentinel lymph node biopsy and tissue expander reconstruction on December 1, 2021.     Stage: Cancer Staging  Malignant neoplasm of upper-outer quadrant of left breast in female, estrogen receptor positive (HCC)  Staging form: Breast, AJCC 8th Edition  - Clinical stage from 10/29/2021: Stage IIA (cT2, cN0, cM0, G3, ER+, UT+, HER2-) - Signed by Lester Edmondson MD on 10/29/2021     Disease Status:  Surgical treatment complete, on letrozole per medical oncology.     History:   This 53 year old woman presented with imaging detected breast cancer.  Patient presented for routine screening mammogram on 9/20/2021, patient has breast density category C.  Right breast did not have any changes.  However, of the left breast upper outer quadrant there were linear calcifications for which additional imaging was recommended.  Additional views were performed on 10/6/2021 which showed segmental pleomorphic calcifications in the left upper outer quadrant with suggestion of architectural distortion and was highly suspicious for malignancy for which stereotactic biopsy was recommended.  There is also loosely grouped calcifications some of which appear to demonstrate linear morphology in the anterior left breast which are suspicious and for which stereotactic biopsy was recommended.  Patient then underwent biopsy on 10/19/2021 of both of the sites above discussed pathology at the left upper outer quadrant was consistent with multifocal small areas of infiltrating ductal carcinoma grade 3, largest tumor area of 0.6 cm.  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  She additionally had multifocal microcalcifications and atypical lobular hyperplasia.  The left of breast the lower posterior area had fragments of fibroadenoma.  The  infiltrating ductal carcinoma was ER 95%, progesterone 99%, HER-2/austin was 2+ by IHC, however negative by FISH and Ki-67 of 25%.  Given the multifocal nature of the invasive cancer on biopsy, as well as the DCIS she underwent bilateral breast MRI on 10/26/2021 which at the left breast upper outer quadrant showed a 2 x 1.6 x 2.7 cm heterogenous mass at the site of the biopsy the area of enhancement measure less then the suspicious calcifications seen on mammogram which measured at least 4.8 cm in AP dimension.  Mastectomy was recommended.  Patient underwent bilateral mastectomies with reconstruction which took place without complication.  She denies any new concerns related to her chest wall since her last visit.  She does report some issues related to her joints since starting her letrozole.  She is here today for evaluation and recommendations for further therapy.        Past Medical History:    Back problem    herniated disc    Breast cancer (HCC)    left breast    Cataract    Colon polyps    Diverticulosis    Esophageal reflux    Gastritis    Hiatal hernia    Hx of motion sickness    Microscopic hematuria    Migraines    menstrual    PONV (postoperative nausea and vomiting)    Visual impairment    completed Cataracts surgeries 8/30/19 & 9/20/19       Past Surgical History:   Procedure Laterality Date    Breast reconstruction  02/2022    Cataract      Completed Cataracts surgery 8/30/19 & 9/20/19    Colonoscopy      Colonoscopy N/A 06/08/2018    Procedure: COLONOSCOPY;  Surgeon: John Silva MD;  Location: [facility] ENDOSCOPY    Colonoscopy N/A 06/27/2023    Procedure: COLONOSCOPY;  Surgeon: John Silva MD;  Location: [facility] ENDO    Cyst aspiration left  09/2016    Sheila biopsy stereo nodule 1 site left (cpt=19081)  10/19/2021    2 site left calcs top hat and cork    Mastectomy left Left 12/2021    One Lymph Node Removed    Mastectomy right  12/2021    Other      Rhinoplasty    Other surgical history  04/30/2024     s/p hysteroscopic polypectomy / currettage    Removal of ovarian cyst(s)  2009    laparascopic right ov cystectomy, paratubal cyst drainage         Medications:     letrozole 2.5 MG Oral Tab Take 1 tablet (2.5 mg total) by mouth every morning. 90 tablet 3    naproxen 500 MG Oral Tab Take 1 tablet (500 mg total) by mouth 2 (two) times daily with meals. Take for 2 weeks as directed and then as needed. 60 tablet 0    cyclobenzaprine 5 MG Oral Tab 5 mg 1-2 tablets three times per day as needed for spasms. Do not operate heavy machinery while on this medication as it may make you sleepy 90 tablet 0    Omega-3 Fatty Acids (OMEGA 3 500 OR) Take by mouth.      Cholecalciferol (VITAMIN D) 50 MCG (2000 UT) Oral Tab Take by mouth.      Docusate Sodium (STOOL SOFTENER OR) Take by mouth every other day.      Biotin 1000 MCG Oral Tab Take 1,000 mcg by mouth in the morning.      Ascorbic Acid (VITAMIN C) 1000 MG Oral Tab Take 1 tablet (1,000 mg total) by mouth in the morning.         Allergies:    Allergies   Allergen Reactions    Iodine [Radiology Contrast Iodinated Dyes] HIVES and RASH    Penicillins HIVES and RASH     No skin blisters or organ involvement       Family History:   Family History   Problem Relation Age of Onset    Breast Cancer Mother 75    Neurological Disorder Mother         Cerebral Aneurysm    Dementia Mother         Due to brain aneurysm 20 years prior    Cancer Mother     Heart Disease Father         CAD    Prostate Cancer Father 85    Cancer Father     Diabetes Brother     Schizophrenia Brother     Other (cerebral hemmorage) Brother     Breast Cancer Paternal Aunt 84    No Known Problems Sister     Colon Cancer Paternal Grandmother     Colon Cancer Paternal Cousin Male 57       She is not of [employer]azi [employer] ancestry.    Social History:  History   Alcohol Use    5.0 standard drinks of alcohol/week    3 Glasses of wine, 2 Shots of liquor per week     Comment: occasional       History   Smoking Status     Never   Smokeless Tobacco    Never       Review of Systems:  General:   The patient denies, fever, chills, night sweats, fatigue, generalized weakness, change in appetite or weight loss.    HEENT:     The patient denies eye irritation, cataracts, redness, glaucoma, yellowing of the eyes, change in vision, color blindness, or wearing contacts/glasses. The patient denies hearing loss, ringing in the ears, ear drainage, earaches, nasal congestion, nose bleeds, snoring, pain in mouth/throat, hoarseness, change in voice, facial trauma.    Respiratory:  The patient denies chronic cough, phlegm, hemoptysis, pleurisy/chest pain, pneumonia, asthma, wheezing, difficulty in breathing with exertion, emphysema, chronic bronchitis, shortness of breath or abnormal sound when breathing.     Cardiovascular:  There is no history of chest pain, chest pressure/discomfort, palpitations, irregular heartbeat, fainting or near-fainting, difficulty breathing when lying flat, SOB/Coughing at night, swelling of the legs or chest pain while walking.    Breasts:  See history of present illness    Gastrointestinal:     There is no history of difficulty or pain with swallowing, reflux symptoms, vomiting, dark or bloody stools, constipation, yellowing of the skin, indigestion, nausea, change in bowel habits, diarrhea, abdominal pain or vomiting blood.     Genitourinary:  The patient denies frequent urination, needing to get up at night to urinate, urinary hesitancy or retaining urine, painful urination, urinary incontinence, decreased urine stream, blood in the urine or vaginal/penile discharge.    Skin:    The patient denies rash, itching, skin lesions, dry skin, change in skin color or change in moles.     Hematologic/Lymphatic:  The patient denies easily bruising or bleeding or persistent swollen glands or lymph nodes.     Musculoskeletal:  The patient denies muscle aches/pain, joint pain, stiff joints, neck pain, back pain or bone  pain.    Neuropsychiatric:  There is no history of migraines or severe headaches, seizure/epilepsy, speech problems, coordination problems, trembling/tremors, fainting/black outs, dizziness, memory problems, loss of sensation/numbness, problems walking, weakness, tingling or burning in hands/feet. There is no history of abusive relationship, bipolar disorder, sleep disturbance, anxiety, depression or feeling of despair.    Endocrine:    There is no history of poor/slow wound healing, weight loss/gain, fertility or hormone problems, cold intolerance, thyroid disease.     Allergic/Immunologic:  There is no history of hives, hay fever, angioedema or anaphylaxis.    /70 (BP Location: Right arm, Patient Position: Sitting, Cuff Size: adult)   Pulse 64   Wt 68 kg (150 lb)   LMP 03/14/2024 (Exact Date)   SpO2 98%   BMI 25.75 kg/m²     Physical Exam:  The patient is an alert, oriented, well-nourished and  well-developed woman who appears her stated age. Her speech patterns and movements are normal. Her affect is appropriate.    HEENT: The head is normocephalic. The neck is supple. The thyroid is not enlarged and is without palpable masses/nodules. There are no palpable masses. The trachea is in the midline. Conjunctiva are clear, non-icteric.    Chest: The chest expands symmetrically. The lungs are clear to auscultation.    Heart: The rhythm is regular.  There are no murmurs, rubs, gallops or thrills.    Breasts:  Her breasts are surgically absent.    Skin on both sides demonstrates no nodules, pigmentation changes or underlying palpable concerns.  She has no palpable axillary adenopathy.    Abdomen:  The abdomen is soft, flat and non tender. The liver is not enlarged. There are no palpable masses.    Lymph Nodes:  The supraclavicular, axillary and cervical regions are free of significant lymphadenopathy.    Back: There is no vertebral column tenderness.    Skin: The skin appears normal. There are no suspicious  appearing rashes or lesions.    Extremities: The extremities are without deformity, cyanosis or edema.    Impression:   Ms. Shalini Cruz is a 55 year old woman presents with imaging detected left breast cancer status post bilateral nipple sparing mastectomies with left sentinel lymph node biopsy and reconstruction.    Discussion and Plan:  I had a discussion with the Patient regarding her breast exam. On exam today I found her to be healing well since her surgery with no signs of new or recurrent disease. She has been tolerating letrozole under the direction of medical oncology with side effects of joint pain and hot flashes.  She will need no further mammograms status post bilateral mastectomies.  Any further reconstructive needs will be directed per plastic surgery.  I encouraged her to continue monitoring her ROM and strength and explained that a referral to physical therapy may be warranted in the future if she identifies any limitations or restrictions. She was encouraged to contact the office with any questions or concerns as needed related to her breast health.  This encounter lasted a total of 25 minutes, more than 50% of which was dedicated to the discussion of management options.

## 2025-06-26 ENCOUNTER — OFFICE VISIT (OUTPATIENT)
Dept: FAMILY MEDICINE CLINIC | Facility: CLINIC | Age: 55
End: 2025-06-26
Payer: COMMERCIAL

## 2025-06-26 ENCOUNTER — LAB ENCOUNTER (OUTPATIENT)
Dept: LAB | Age: 55
End: 2025-06-26
Attending: FAMILY MEDICINE
Payer: COMMERCIAL

## 2025-06-26 VITALS
WEIGHT: 148.81 LBS | BODY MASS INDEX: 25.41 KG/M2 | HEIGHT: 64 IN | SYSTOLIC BLOOD PRESSURE: 110 MMHG | HEART RATE: 59 BPM | DIASTOLIC BLOOD PRESSURE: 75 MMHG

## 2025-06-26 DIAGNOSIS — Z00.00 ROUTINE MEDICAL EXAM: ICD-10-CM

## 2025-06-26 DIAGNOSIS — Z78.0 POST-MENOPAUSAL: ICD-10-CM

## 2025-06-26 DIAGNOSIS — E55.9 VITAMIN D DEFICIENCY: ICD-10-CM

## 2025-06-26 DIAGNOSIS — Z85.3 HISTORY OF INFILTRATING DUCTAL CARCINOMA OF BREAST: Primary | ICD-10-CM

## 2025-06-26 DIAGNOSIS — G47.00 INSOMNIA, UNSPECIFIED TYPE: ICD-10-CM

## 2025-06-26 PROBLEM — Z17.0 MALIGNANT NEOPLASM OF UPPER-OUTER QUADRANT OF LEFT BREAST IN FEMALE, ESTROGEN RECEPTOR POSITIVE (HCC): Status: RESOLVED | Noted: 2021-10-29 | Resolved: 2025-06-26

## 2025-06-26 PROBLEM — R10.2 ADNEXAL TENDERNESS, RIGHT: Status: RESOLVED | Noted: 2017-05-18 | Resolved: 2025-06-26

## 2025-06-26 PROBLEM — C50.412 MALIGNANT NEOPLASM OF UPPER-OUTER QUADRANT OF LEFT BREAST IN FEMALE, ESTROGEN RECEPTOR POSITIVE (HCC): Status: RESOLVED | Noted: 2021-10-29 | Resolved: 2025-06-26

## 2025-06-26 PROBLEM — N64.4 BREAST PAIN: Status: RESOLVED | Noted: 2018-07-30 | Resolved: 2025-06-26

## 2025-06-26 LAB
ALBUMIN SERPL-MCNC: 4.9 G/DL (ref 3.2–4.8)
ALBUMIN/GLOB SERPL: 2.2 {RATIO} (ref 1–2)
ALP LIVER SERPL-CCNC: 83 U/L (ref 46–118)
ALT SERPL-CCNC: 16 U/L (ref 10–49)
ANION GAP SERPL CALC-SCNC: 7 MMOL/L (ref 0–18)
AST SERPL-CCNC: 18 U/L (ref ?–34)
BASOPHILS # BLD AUTO: 0.03 X10(3) UL (ref 0–0.2)
BASOPHILS NFR BLD AUTO: 0.5 %
BILIRUB SERPL-MCNC: 0.6 MG/DL (ref 0.3–1.2)
BUN BLD-MCNC: 19 MG/DL (ref 9–23)
BUN/CREAT SERPL: 26 (ref 10–20)
CALCIUM BLD-MCNC: 9.6 MG/DL (ref 8.7–10.4)
CHLORIDE SERPL-SCNC: 108 MMOL/L (ref 98–112)
CHOLEST SERPL-MCNC: 237 MG/DL (ref ?–200)
CO2 SERPL-SCNC: 27 MMOL/L (ref 21–32)
CREAT BLD-MCNC: 0.73 MG/DL (ref 0.55–1.02)
DEPRECATED RDW RBC AUTO: 41 FL (ref 35.1–46.3)
EGFRCR SERPLBLD CKD-EPI 2021: 96 ML/MIN/1.73M2 (ref 60–?)
EOSINOPHIL # BLD AUTO: 0.1 X10(3) UL (ref 0–0.7)
EOSINOPHIL NFR BLD AUTO: 1.6 %
ERYTHROCYTE [DISTWIDTH] IN BLOOD BY AUTOMATED COUNT: 11.9 % (ref 11–15)
FASTING PATIENT LIPID ANSWER: YES
FASTING STATUS PATIENT QL REPORTED: YES
GLOBULIN PLAS-MCNC: 2.2 G/DL (ref 2–3.5)
GLUCOSE BLD-MCNC: 98 MG/DL (ref 70–99)
HCT VFR BLD AUTO: 44.2 % (ref 35–48)
HDLC SERPL-MCNC: 87 MG/DL (ref 40–59)
HGB BLD-MCNC: 14.8 G/DL (ref 12–16)
IMM GRANULOCYTES # BLD AUTO: 0.01 X10(3) UL (ref 0–1)
IMM GRANULOCYTES NFR BLD: 0.2 %
LDLC SERPL CALC-MCNC: 134 MG/DL (ref ?–100)
LYMPHOCYTES # BLD AUTO: 2.02 X10(3) UL (ref 1–4)
LYMPHOCYTES NFR BLD AUTO: 31.6 %
MCH RBC QN AUTO: 31.1 PG (ref 26–34)
MCHC RBC AUTO-ENTMCNC: 33.5 G/DL (ref 31–37)
MCV RBC AUTO: 92.9 FL (ref 80–100)
MONOCYTES # BLD AUTO: 0.47 X10(3) UL (ref 0.1–1)
MONOCYTES NFR BLD AUTO: 7.4 %
NEUTROPHILS # BLD AUTO: 3.76 X10 (3) UL (ref 1.5–7.7)
NEUTROPHILS # BLD AUTO: 3.76 X10(3) UL (ref 1.5–7.7)
NEUTROPHILS NFR BLD AUTO: 58.7 %
NONHDLC SERPL-MCNC: 150 MG/DL (ref ?–130)
OSMOLALITY SERPL CALC.SUM OF ELEC: 296 MOSM/KG (ref 275–295)
PLATELET # BLD AUTO: 207 10(3)UL (ref 150–450)
POTASSIUM SERPL-SCNC: 4.4 MMOL/L (ref 3.5–5.1)
PROT SERPL-MCNC: 7.1 G/DL (ref 5.7–8.2)
RBC # BLD AUTO: 4.76 X10(6)UL (ref 3.8–5.3)
SODIUM SERPL-SCNC: 142 MMOL/L (ref 136–145)
TRIGL SERPL-MCNC: 95 MG/DL (ref 30–149)
TSI SER-ACNC: 1.57 UIU/ML (ref 0.55–4.78)
VIT B12 SERPL-MCNC: 431 PG/ML (ref 211–911)
VIT D+METAB SERPL-MCNC: 52.7 NG/ML (ref 30–100)
VLDLC SERPL CALC-MCNC: 17 MG/DL (ref 0–30)
WBC # BLD AUTO: 6.4 X10(3) UL (ref 4–11)

## 2025-06-26 PROCEDURE — 36415 COLL VENOUS BLD VENIPUNCTURE: CPT

## 2025-06-26 PROCEDURE — 84443 ASSAY THYROID STIM HORMONE: CPT

## 2025-06-26 PROCEDURE — 80061 LIPID PANEL: CPT

## 2025-06-26 PROCEDURE — 82607 VITAMIN B-12: CPT

## 2025-06-26 PROCEDURE — 85025 COMPLETE CBC W/AUTO DIFF WBC: CPT

## 2025-06-26 PROCEDURE — 80053 COMPREHEN METABOLIC PANEL: CPT

## 2025-06-26 PROCEDURE — 82306 VITAMIN D 25 HYDROXY: CPT

## 2025-06-26 RX ORDER — ALPRAZOLAM 0.25 MG
0.25 TABLET ORAL NIGHTLY PRN
Qty: 30 TABLET | Refills: 0 | Status: SHIPPED | OUTPATIENT
Start: 2025-06-26

## 2025-06-26 RX ORDER — FLUOXETINE 10 MG/1
10 CAPSULE ORAL EVERY EVENING
Qty: 90 CAPSULE | Refills: 1 | Status: SHIPPED | OUTPATIENT
Start: 2025-06-26

## 2025-06-26 NOTE — PROGRESS NOTES
The following individual(s) verbally consented to be recorded using ambient AI listening technology and understand that they can each withdraw their consent to this listening technology at any point by asking the clinician to turn off or pause the recording:    Patient name: Shalini Cruz    HPI:   Shalini Cruz is a 55 year old female who presents for a complete physical exam.    History of Present Illness  Shalini Cruz is a 55 year old female here for physical with history of breast cancer who presents with sleep disturbances and hot flashes.    She experiences ongoing sleep disturbances, characterized by waking up due to hot flashes and subsequent racing thoughts. She uses alprazolam intermittently, taking 0.25 mg as needed, and occasionally 0.5 mg, but prefers not to use it regularly. These sleep issues occur every few days, leading to significant sleep deprivation.    She is currently on letrozole for breast cancer and attributes some of her symptoms to menopause. She experiences hot flashes, which she believes contribute to her sleep disturbances. She has not tried any specific treatments for the hot flashes yet.    Her current medications include letrozole and occasional alprazolam for sleep. She is concerned about potential weight gain with new medications, as she has noticed weight fluctuations, which she attributes to menopause and her current medication regimen.    She maintains a regular exercise routine, including walking and weight training three to four times a week. She is fasting today and has a bone scan scheduled for September.    No issues with constipation, reporting regular use of stool softeners. She also denies any dental issues, swelling, or pain.       Wt Readings from Last 3 Encounters:   06/26/25 148 lb 12.8 oz (67.5 kg)   06/11/25 150 lb (68 kg)   05/15/25 149 lb 3.2 oz (67.7 kg)     Body mass index is 25.54 kg/m².       Current Medications[1]   Past Medical History[2]    Past Surgical History[3]   Family History[4]   Social History:   Short Social Hx on File[5]       REVIEW OF SYSTEMS:   GENERAL: feels well otherwise  Review of Systems   See HPI   EXAM:   /75   Pulse 59   Ht 5' 4\" (1.626 m)   Wt 148 lb 12.8 oz (67.5 kg)   LMP 03/14/2024 (Exact Date)   BMI 25.54 kg/m²     GENERAL: well developed, well nourished,in no apparent distress  SKIN: no rashes,no suspicious lesions  HEENT: atraumatic, normocephalic,ears and throat are clear  EYES:EOMI, conjunctiva are clear  NECK: supple,no adenopathy  LUNGS: clear to auscultation  CARDIO: RRR without murmur  GI: good BS's,no masses, HSM or tenderness  EXTREMITIES: no cyanosis, clubbing or edema  NEURO: Oriented times three,cranial nerves are intact,motor and sensory are grossly intact    ASSESSMENT AND PLAN:        Menopausal symptoms  Experiencing menopausal symptoms exacerbated by letrozole. Discussed SSRIs, specifically Prozac, for symptom management. Prozac may alleviate hot flashes and improve sleep. Addressed concerns about side effects. Prozac preferred over benzodiazepines for long-term management due to better safety profile.  - Prescribe Prozac 10 mg in the evenings.  - Prescribe alprazolam 0.25 mg as needed for sleep disturbances.  - Educated on Prozac's benefits for hot flashes and sleep.  - Advise a one-month trial of Prozac to assess effectiveness.  - Discuss discontinuation of Prozac if ineffective or adverse effects occur. Discussed other options like Effexor or Paxil but both harder to stop and require period of weaning off.     Letrozole side effects  Experiencing letrozole side effects contributing to menopausal symptoms. Considering discontinuation due to bone health concerns. Weighing risks and benefits with a year and a half remaining on letrozole. Previous mastectomies and a low score influence decision-making.  - Monitor bone health with a bone scan as per oncologist's order.  - Continue letrozole as  recommended by oncologist.  - Discuss letrozole's impact on bone health and importance of monitoring.    General Health Maintenance  Engaging in regular physical activity. Blood pressure well-controlled. Fasting for lab work and bone scan scheduled. Under oncologist's care for ongoing monitoring.  - Perform fasting lab work.  - Encourage continued physical activity and healthy lifestyle.  - Ensure follow-up with oncologist for bone scan and vitamin D assessment.       1. History of infiltrating ductal carcinoma of breast      2. Routine medical exam    - CBC With Differential With Platelet; Future  - Comp Metabolic Panel (14); Future  - Lipid Panel; Future  - TSH W Reflex To Free T4; Future  - Vitamin B12; Future    3. Insomnia, unspecified type      4. Post-menopausal    - FLUoxetine 10 MG Oral Cap; Take 1 capsule (10 mg total) by mouth every evening.  Dispense: 90 capsule; Refill: 1  - ALPRAZolam 0.25 MG Oral Tab; Take 1 tablet (0.25 mg total) by mouth nightly as needed.  Dispense: 30 tablet; Refill: 0       Yudy Jaimes MD  6/26/2025  9:37 AM           [1]   Current Outpatient Medications   Medication Sig Dispense Refill    FLUoxetine 10 MG Oral Cap Take 1 capsule (10 mg total) by mouth every evening. 90 capsule 1    ALPRAZolam 0.25 MG Oral Tab Take 1 tablet (0.25 mg total) by mouth nightly as needed. 30 tablet 0    letrozole 2.5 MG Oral Tab Take 1 tablet (2.5 mg total) by mouth every morning. 90 tablet 3    naproxen 500 MG Oral Tab Take 1 tablet (500 mg total) by mouth 2 (two) times daily with meals. Take for 2 weeks as directed and then as needed. 60 tablet 0    cyclobenzaprine 5 MG Oral Tab 5 mg 1-2 tablets three times per day as needed for spasms. Do not operate heavy machinery while on this medication as it may make you sleepy 90 tablet 0    Omega-3 Fatty Acids (OMEGA 3 500 OR) Take by mouth.      Cholecalciferol (VITAMIN D) 50 MCG (2000 UT) Oral Tab Take by mouth.      Docusate Sodium (STOOL SOFTENER  OR) Take by mouth every other day.      Biotin 1000 MCG Oral Tab Take 1,000 mcg by mouth in the morning.      Ascorbic Acid (VITAMIN C) 1000 MG Oral Tab Take 1 tablet (1,000 mg total) by mouth in the morning.     [2]   Past Medical History:   Back problem    herniated disc    Breast cancer (HCC)    left breast    Cancer (HCC)    Cataract    Colon polyp    Colon polyps    Diverticular disease    Diverticulosis    Endometriosis    Esophageal reflux    Gastritis    Hiatal hernia    Hx of motion sickness    Microscopic hematuria    Migraines    menstrual    PONV (postoperative nausea and vomiting)    Visual impairment    completed Cataracts surgeries 8/30/19 & 9/20/19   [3]   Past Surgical History:  Procedure Laterality Date    Breast reconstruction  02/2022    Breast surgery  12/1/2021    Double Mastectomy    Cataract      Completed Cataracts surgery 8/30/19 & 9/20/19    Colonoscopy      Colonoscopy N/A 06/08/2018    Procedure: COLONOSCOPY;  Surgeon: John Silva MD;  Location: [facility] ENDOSCOPY    Colonoscopy N/A 06/27/2023    Procedure: COLONOSCOPY;  Surgeon: John Silva MD;  Location: [facility] ENDO    Cosmetic surgery      Cyst aspiration left  09/2016    Cyst removal      Implantable breast prosthesis  2/24/2022    Reconstruction    Sheila biopsy stereo nodule 1 site left (cpt=19081)  10/19/2021    2 site left calcs top hat and cork    Mastectomy left Left 12/2021    One Lymph Node Removed    Mastectomy right  12/2021    Other      Rhinoplasty    Other surgical history  04/30/2024    s/p hysteroscopic polypectomy / currettage    Removal of ovarian cyst(s)  2009    laparascopic right ov cystectomy, paratubal cyst drainage   [4]   Family History  Problem Relation Age of Onset    Breast Cancer Mother 75    Neurological Disorder Mother         Cerebral Aneurysm    Dementia Mother         Due to brain aneurysm 20 years prior    Cancer Mother     Heart Disease Father         CAD    Prostate Cancer Father 85    Cancer  Father     Diabetes Brother     Schizophrenia Brother     Other (cerebral hemmorage) Brother     Breast Cancer Paternal Aunt 84    No Known Problems Sister     Colon Cancer Paternal Grandmother     Colon Cancer Paternal Cousin Male 57   [5]   Social History  Socioeconomic History    Marital status: Single   Tobacco Use    Smoking status: Never    Smokeless tobacco: Never   Vaping Use    Vaping status: Never Used   Substance and Sexual Activity    Alcohol use: Yes     Alcohol/week: 5.0 standard drinks of alcohol     Types: 3 Glasses of wine, 2 Shots of liquor per week     Comment: occasional    Drug use: No    Sexual activity: Yes   Other Topics Concern    Caffeine Concern Yes     Comment: coffee

## (undated) DIAGNOSIS — C50.412 MALIGNANT NEOPLASM OF UPPER-OUTER QUADRANT OF LEFT BREAST IN FEMALE, ESTROGEN RECEPTOR POSITIVE (HCC): Primary | ICD-10-CM

## (undated) DIAGNOSIS — Z17.0 MALIGNANT NEOPLASM OF UPPER-OUTER QUADRANT OF LEFT BREAST IN FEMALE, ESTROGEN RECEPTOR POSITIVE (HCC): Primary | ICD-10-CM

## (undated) DEVICE — 3M™ TEGADERM™ TRANSPARENT FILM DRESSING FRAME STYLE, 1628, 6 IN X 8 IN (15 CM X 20 CM), 10/CT 8CT/CASE: Brand: 3M™ TEGADERM™

## (undated) DEVICE — MAJOR GENERAL: Brand: MEDLINE INDUSTRIES, INC.

## (undated) DEVICE — NEEDLE HPO 18GA 1.5IN ECLPS

## (undated) DEVICE — 3M™ STERI-STRIP™ REINFORCED ADHESIVE SKIN CLOSURES, R1548, 1 IN X 5 IN (25 MM X 125 MM), 4 STRIPS/ENVELOPE: Brand: 3M™ STERI-STRIP™

## (undated) DEVICE — 3M™ IOBAN™ 2 ANTIMICROBIAL INCISE DRAPE 6650EZ: Brand: IOBAN™ 2

## (undated) DEVICE — SUCTION CANISTER, 3000CC,SAFELINER: Brand: DEROYAL

## (undated) DEVICE — SUTURE ETHILON 3-0 669H

## (undated) DEVICE — Device: Brand: DEFENDO AIR/WATER/SUCTION AND BIOPSY VALVE

## (undated) DEVICE — Device: Brand: MEDEX

## (undated) DEVICE — SUTURE VICRYL 3-0 SH

## (undated) DEVICE — 60 ML SYRINGE LUER-LOCK TIP: Brand: MONOJECT

## (undated) DEVICE — EXOFIN TISSUE ADHESIVE 1.0ML

## (undated) DEVICE — BLAKE SILICONE DRAIN, 15 FR ROUND, HUBLESS WITH 3/16" TROCAR: Brand: BLAKE

## (undated) DEVICE — Device

## (undated) DEVICE — KIT ENDO ORCAPOD 160/180/190

## (undated) DEVICE — BANDAGE ROLL,100% COTTON, 6 PLY, LARGE: Brand: KERLIX

## (undated) DEVICE — 60 ML SYRINGE REGULAR TIP: Brand: MONOJECT

## (undated) DEVICE — Device: Brand: DUAL NARE NASAL CANNULAE FEMALE LUER CON 7FT O2 TUBE

## (undated) DEVICE — FLEXIBLE YANKAUER,MEDIUM TIP, NO VACUUM CONTROL: Brand: ARGYLE

## (undated) DEVICE — Device: Brand: MICROAIRE®

## (undated) DEVICE — COVER PRB NEOGUARD 30X2.6CM US

## (undated) DEVICE — MINOR GENERAL: Brand: MEDLINE INDUSTRIES, INC.

## (undated) DEVICE — SUTURE MONOCRYL 4-0 PS-2

## (undated) DEVICE — PROXIMATE SKIN STAPLERS (35 WIDE) CONTAINS 35 STAINLESS STEEL STAPLES (FIXED HEAD): Brand: PROXIMATE

## (undated) DEVICE — SOFT TISSUE SHAVER MINI: Brand: TRUCLEAR

## (undated) DEVICE — GAMMEX® PI HYBRID SIZE 7.5, STERILE POWDER-FREE SURGICAL GLOVE, POLYISOPRENE AND NEOPRENE BLEND: Brand: GAMMEX

## (undated) DEVICE — CANISTER SUCT 3000CC HI FLO DISP FOR FLD MGMT

## (undated) DEVICE — SNARE CAPTIFLEX MICRO-OVL OLY

## (undated) DEVICE — KIT HYSTEROSCOPIC PROC INCL INFLO OUTFLO TB

## (undated) DEVICE — SOL  .9 1000ML BTL

## (undated) DEVICE — 3M™ BAIR HUGGER® UNDERBODY BLANKET, FULL ACCESS, 10 PER CASE 63500: Brand: BAIR HUGGER™

## (undated) DEVICE — CLIP SM INTNL HMCLP TNTLM ESCP

## (undated) DEVICE — 6 ML SYRINGE LUER-LOCK TIP: Brand: MONOJECT

## (undated) DEVICE — CHLORAPREP 26ML APPLICATOR

## (undated) DEVICE — SUTURE PDS II 2-0 CT-2

## (undated) DEVICE — TRAP 4 CPTR CHMBR N EZ INLN

## (undated) DEVICE — VIOLET BRAIDED (POLYGLACTIN 910), SYNTHETIC ABSORBABLE SUTURE: Brand: COATED VICRYL

## (undated) DEVICE — KIT CLEAN ENDOKIT 1.1OZ GOWNX2

## (undated) DEVICE — FORCEP RADIAL JAW 4

## (undated) DEVICE — 1010 S-DRAPE TOWEL DRAPE 10/BX: Brand: STERI-DRAPE™

## (undated) DEVICE — MEDI-VAC NON-CONDUCTIVE SUCTION TUBING 6MM X 1.8M (6FT.) L: Brand: CARDINAL HEALTH

## (undated) DEVICE — DRESSING FOAM TOPIFOAM

## (undated) DEVICE — 9534HP TRANSPARENT DRSG W/FRAME: Brand: 3M™ TEGADERM™

## (undated) DEVICE — DRESSING BIOPATCH 1X4 CNTR

## (undated) DEVICE — BETADINE SOL 32 OZ 10% POVIDON

## (undated) DEVICE — SOLUTION IRRIG 3000ML 0.9% NACL FLX CONT

## (undated) DEVICE — ASPIRATION TUBING SET, DISPOSABLE: Brand: MICROAIRE®

## (undated) DEVICE — SUTURE VICRYL 2-0 SH

## (undated) DEVICE — PEN: MARKING STD PT 100/CS: Brand: MEDICAL ACTION INDUSTRIES

## (undated) DEVICE — SUTURE VICRYL 0 J340H

## (undated) DEVICE — CASED DISP BIPOLAR CORD

## (undated) DEVICE — ENDOSCOPY PACK - LOWER: Brand: MEDLINE INDUSTRIES, INC.

## (undated) DEVICE — SUTURE PDS II 4-0 PS-2

## (undated) DEVICE — CLIP MED INTNL HMCLP TNTLM

## (undated) DEVICE — ELITE HYSTEROSCOPE SEAL: Brand: TRUCLEAR

## (undated) DEVICE — ENCORE® LATEX MICRO SIZE 6.5, STERILE LATEX POWDER-FREE SURGICAL GLOVE: Brand: ENCORE

## (undated) DEVICE — CAUTERY BLADE 2IN INS E1455

## (undated) DEVICE — SUTURE SILK 2-0 FS

## (undated) DEVICE — GLOVE SUR 6.5 SENSICARE PI MIC PIP CRM PWD F

## (undated) DEVICE — BRA SURG ELIZ PINK M

## (undated) DEVICE — STANDARD HYPODERMIC NEEDLE,POLYPROPYLENE HUB: Brand: MONOJECT

## (undated) DEVICE — HYSTEROSCOPY: Brand: MEDLINE INDUSTRIES, INC.

## (undated) DEVICE — SUPER SPONGES,MEDIUM: Brand: KERLIX

## (undated) DEVICE — SUTURE VICRYL 3-0 J497G

## (undated) DEVICE — SUTURE CHROMIC GUT 5-0 P-3

## (undated) DEVICE — SYRINGE BULB 50/CS 48/PLT: Brand: MEDEGEN MEDICAL PRODUCTS, LLC

## (undated) DEVICE — CONTAINER SPEC STR 4OZ GRY LID

## (undated) DEVICE — TIP BOVIE 4\" MEGADYNE

## (undated) DEVICE — DRAIN RESERVOIR RELIAVAC 100CC

## (undated) NOTE — LETTER
[address] [place] [address]      Authorization for Surgical Operation and Procedure     Date:___________                                                                                                         Time:_______ receiving a blood transfusion and/or blood products.   The following are some, but not all, of the potential risks that can occur: fever and allergic reactions, hemolytic reactions, transmission of diseases such as Hepatitis, AIDS and Cytomegalovirus (CMV) stated by me (or a person authorized to consent on my behalf). The surgeon or my attending physician will determine when the applicable recovery period ends for purposes of reinstating the DNAR order.   10. Patients having a sterilization procedure: I under procedure/surgery, I have disclosed this and had a discussion with my patient.     _______________________________________________________________ _____________________________  Damaris Kumar Physician)

## (undated) NOTE — LETTER
[address], [place], [place]       INFORMED REFUSAL FOR TREATMENT / PROCEDURE / TESTING      I have been advised by John Villa___ that it is necessary for me to undergo the following treatment, procedure or testin

## (undated) NOTE — LETTER
[address] [place] [address]      Authorization for Surgical Operation and Procedure     Date:___________                                                                                                         Time:_______ of receiving a blood transfusion and/or blood products.   The following are some, but not all, of the potential risks that can occur: fever and allergic reactions, hemolytic reactions, transmission of diseases such as Hepatitis, AIDS and Cytomegalovirus (CM stated by me (or a person authorized to consent on my behalf). The surgeon or my attending physician will determine when the applicable recovery period ends for purposes of reinstating the DNAR order.   10. Patients having a sterilization procedure: I under procedure/surgery, I have disclosed this and had a discussion with my patient.     _______________________________________________________________ _____________________________  Garnell Favor of Physician)

## (undated) NOTE — LETTER
11/4/2021          To Whom It May Concern:    Charlotte Lara is currently under my medical care. Carlos Reed was seen for preoperative clearance for planned mastectomy and eventual reconstruction.  I have reviewed her labs, urine and EKG which are stable and sh

## (undated) NOTE — LETTER
MINOR CASE LETTER      4/15/2024        Dear Shalini,    Your are having a Hysteroscopic Curettage, Possible Polypectomy on Tuesday,04/30/2024 at 7:30am at [facility].    Do not eat or drink anything (including water) after midnight the night before surgery.    If your procedure is scheduled later in the day, then nothing by mouth for 6 hours before arrival to the hospital.    You are to call this office if you have any cold or flu symptoms 2 days before your scheduled surgery.    Please avoid ALL aspirin and herbal supplements 7 days before surgery.  Avoid Ibuprofen, Motrin, Aleve, or Naprosyn for 3 days before surgery.    Please avoid ALL Cannabis use 24 hours prior to surgery.    You cannot wear hair pins,wigs,artificial nail or metallic nails/ nail polish for surgery.    You will be contacted by PreAdmission Testing (PAT) usually within the week before surgery.  They will take a short medical history and let you know if any preoperative testing is needed. If you have any questions for preadmission testing please feel free to contact them by calling [phone].    Per your insurance no prior authorization is needed for surgery.    Please make arrangements for someone to drive you home after your surgery.    Call our office now to schedule your post-operative appointment for 2 weeks after surgery.    Please feel free to contact our office at [phone] if you have any questions regarding these instructions or your procedure.      Sincerely,          Kristal Dos Santos MD  [facility]  [address] [place] [id number] [phone]

## (undated) NOTE — LETTER
Date: 2024      Patient Name: Shalini Cruz      : 1970        Thank you for choosing [facility] as your health care provider. Your physician has deemed the following medical service(s) necessary. However, your insurance plan may not pay for all of your health care and costs and may deny payment for this service. The fact that your insurance plan does not pay for an item or service does not mean you should not receive it. The purpose of this form is to help you make an informed decision about whether or not you want to receive this service(s) that may not be paid for by your insurance plan.    CPT Code Description     Cost     Left Knee Monovisc     _________ ______________________________ _____________      _________ ______________________________ _____________      I understand that the above mentioned service(s) or supply may not be covered by my insurance company. I agree to be financially responsible for the cost of this service or supply in the event of my insurance denies payment as a non-covered benefit.        ______________________________________________________________________  Signature of Patient or Patient's Representative  Relationship  Date    ______________________________________________________________________  Signature of Witness to signing of form   Printed Name

## (undated) NOTE — LETTER
4/4/2023    Александр Irizarryruel        [facility]            Dear Rohini Stroud,      Our records indicate that you are due for an appointment for a Colonoscopy with Molly Almazan MD. Our doctors are booking out about 3-5 months in advance for procedures. Please call our office to schedule a phone screening appointment to plan for the procedure(s). Your medical well-being is important to us. If your insurance requires a referral, please call your primary care office to request one.       Thank you,      The Physicians and Staff at [facility]

## (undated) NOTE — LETTER
AUTHORIZATION FOR SURGICAL OPERATION OR OTHER PROCEDURE    1. I hereby authorize Dr. Wendy Spangler, and [facility] staff assigned to my case to perform the following operation and/or procedure at the [facility]     2.  My physician has explained the nature and purpose of the operation or other procedure, possible alternative methods of treatment, the risks involved, and the possibility of complication to me. I acknowledge that no guarantee has been made as to the result that may be obtained. 3.  I recognize that, during the course of this operation, or other procedure, unforseen conditions may necessitate additional or different procedure than those listed above. I, therefore, further authorize and request that the above named physician, his/her physician assistants or designees perform such procedures as are, in his/her professional opinion, necessary and desirable. 4.  Any tissue or organs removed in the operation or other procedure may be disposed of by and at the discretion of the [facility] and [facility]. 5.  I understand that in the event of a medical emergency, I will be transported by local paramedics to [facility] or other hospital emergency department. 6.  I certify that I have read and fully understand the above consent to operation and/or other procedure. 7.  I acknowledge that my physician has explained sedation/analgesia administration to me including the risks and benefits. I consent to the administration of sedation/analgesia as may be necessary or desirable in the judgement of my physician. Witness signature: ___________________________________________________ Date:  ______/______/_____                    Time:  ________ A. M.  P.M.        Patient Name:  ______________________________________________________  (please print)      Patient signature: ___________________________________________________               Statement of Physician  My signature below affirms that prior to the time of the procedure, I have explained to the patient and/or his/her guardian, the risks and benefits involved in the proposed treatment and any reasonable alternative to the proposed treatment. I have also explained the risks and benefits involved in the refusal of the proposed treatment and have answered the patient's questions.                         Date:  ______/______/_______  Provider                      Signature:  __________________________________________________________       Time:  ___________ AROCKY AGUILAR

## (undated) NOTE — LETTER
AUTHORIZATION FOR SURGICAL OPERATION OR OTHER PROCEDURE    1. I hereby authorize Dr. Alex Behar and the [facility] staff assigned to my case to perform the following operation and/or procedure at the [facility]:    Left Knee Monovisc and CSI under US    2.  My physician has explained the nature and purpose of the operation or other procedure, possible alternative methods of treatment, the risks involved, and the possibility of complication to me.  I acknowledge that no guarantee has been made as to the result that may be obtained.  3.  I recognize that, during the course of this operation, or other procedure, unforseen conditions may necessitate additional or different procedure than those listed above.  I, therefore, further authorize and request that the above named physician, his/her physician assistants or designees perform such procedures as are, in his/her professional opinion, necessary and desirable.  4.  Any tissue or organs removed in the operation or other procedure may be disposed of by and at the discretion of the [facility] staff and [facility].  5.  I understand that in the event of a medical emergency, I will be transported by local paramedics to [facility] or other hospital emergency department.  6.  I certify that I have read and fully understand the above consent to operation and/or other procedure.    7.  I acknowledge that my physician has explained sedation/analgesia administration to me including the risks and benefits.  I consent to the administration of sedation/analgesia as may be necessary or desirable in the judgement of my physician.    Witness signature: ___________________________________________________ Date:  ______/______/_____                    Time:  ________ A.M.  P.M.       Patient Name:  Shalini Cruz  4/16/1970  [id number]       Patient signature:  ___________________________________________________                 Statement of  Physician  My signature below affirms that prior to the time of the procedure, I have explained to the patient and/or his/her guardian, the risks and benefits involved in the proposed treatment and any reasonable alternative to the proposed treatment.  I have also explained the risks and benefits involved in the refusal of the proposed treatment and have answered the patient's questions.                        Date:  ______/______/_______  Provider                      Signature:  __________________________________________________________       Time:  ___________ A.M    P.M.

## (undated) NOTE — LETTER
[address] [place] [address]      Authorization for Surgical Operation and Procedure     Date:___________                                                                                                         Time:_______ I understand that despite careful testing and screening of blood or blood products by collecting agencies, I may still be subject to ill effects as a result of receiving a blood transfusion and/or blood products.   The following are some, but not all, of th (DNAR) order in place, that status will be suspended while in the operating room, procedural suite, and during the recovery period unless otherwise explicitly stated by me (or a person authorized to consent on my behalf).  The surgeon or my attending physic financial interest in a co-management agreement or a significant financial interest in any product or implant, or other significant relationship used in this procedure/surgery, I have disclosed this and had a discussion with my patient.     _______________

## (undated) NOTE — LETTER
07/01/20        [address] [id number]      Dear Kevin Moon,    [facility] records indicate that you have outstanding lab work and or testing that was ordered for you and has not yet been completed:  Orders Placed This [facility]

## (undated) NOTE — LETTER
[address] [place] [address]  Authorization for Surgical Operation or Procedure  Date: ______________       Time: _______________  1.  I hereby authorize Dr. Santi Herron my physician and the assistant, to perform the following operation and/ this with my physician. 5. I consent to the photographing of the operations or procedures to be performed for the purposes of advancing medicine, science, and/or education, provided my identity is not revealed.  If the procedure has been videotaped, the risks and benefits involved in the proposed treatment and any reasonable alternative to the proposed treatment. I have also explained the risks and benefits involved in the refusal of the proposed treatment and have answered the patient's questions.  If I h

## (undated) NOTE — LETTER
08/02/21        [address]  [address] [facility]      Dear Erika Limon,    [facility] records indicate that you have outstanding lab work and or testing that was ordered for you and has not yet been completed:  Orders Placed This [facility]